# Patient Record
Sex: FEMALE | Race: OTHER | Employment: UNEMPLOYED | URBAN - METROPOLITAN AREA
[De-identification: names, ages, dates, MRNs, and addresses within clinical notes are randomized per-mention and may not be internally consistent; named-entity substitution may affect disease eponyms.]

---

## 2020-01-14 PROBLEM — D25.9 UTERINE FIBROMYOMA: Status: ACTIVE | Noted: 2020-01-14

## 2020-01-14 PROBLEM — R10.2 PELVIC PAIN: Status: ACTIVE | Noted: 2020-01-14

## 2020-01-14 PROBLEM — N92.0 MENORRHAGIA: Status: ACTIVE | Noted: 2020-01-14

## 2020-10-23 ENCOUNTER — OFFICE VISIT (OUTPATIENT)
Dept: ORTHOPEDIC SURGERY | Age: 40
End: 2020-10-23
Payer: COMMERCIAL

## 2020-10-23 VITALS
HEIGHT: 66 IN | RESPIRATION RATE: 16 BRPM | WEIGHT: 215.8 LBS | OXYGEN SATURATION: 97 % | SYSTOLIC BLOOD PRESSURE: 116 MMHG | TEMPERATURE: 97.7 F | HEART RATE: 103 BPM | DIASTOLIC BLOOD PRESSURE: 76 MMHG | BODY MASS INDEX: 34.68 KG/M2

## 2020-10-23 DIAGNOSIS — M25.562 CHRONIC PAIN OF LEFT KNEE: ICD-10-CM

## 2020-10-23 DIAGNOSIS — M17.12 PRIMARY OSTEOARTHRITIS OF LEFT KNEE: Primary | ICD-10-CM

## 2020-10-23 DIAGNOSIS — G89.29 CHRONIC PAIN OF LEFT KNEE: ICD-10-CM

## 2020-10-23 PROCEDURE — 73562 X-RAY EXAM OF KNEE 3: CPT | Performed by: SPECIALIST

## 2020-10-23 PROCEDURE — 99203 OFFICE O/P NEW LOW 30 MIN: CPT | Performed by: SPECIALIST

## 2020-10-23 PROCEDURE — 20610 DRAIN/INJ JOINT/BURSA W/O US: CPT | Performed by: SPECIALIST

## 2020-10-23 RX ORDER — BETAMETHASONE SODIUM PHOSPHATE AND BETAMETHASONE ACETATE 3; 3 MG/ML; MG/ML
6 INJECTION, SUSPENSION INTRA-ARTICULAR; INTRALESIONAL; INTRAMUSCULAR; SOFT TISSUE ONCE
Qty: 0.5 ML | Refills: 0
Start: 2020-10-23 | End: 2020-10-23

## 2020-10-23 NOTE — PROGRESS NOTES
Patient: Em Saenz                MRN: [id number]       SSN: [id number]  YOB: 1980        AGE: 41 y.o. SEX: female    PCP: Fang Cardenas MD  10/23/20    Chief Complaint   Patient presents with    Knee Pain     left knee pain     HISTORY:  Em Saenz is a 41 y.o. female who is seen for left knee pain. She has been experiencing left knee pain for the past year. She feels medial and lateral knee pain. She does not recall any recent injury. 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 She is s/p left arthroscopy in 2006 in [facility].  She responded to her arthroscopies. She reports she has a schwannoma and was told it would cause leg pain. She feels pain with standing, walking and stair climbing. She experiences startup pain after sitting. She feels as if her knee will pop out of place. She as a sharp, burning sensation and experiences swelling. She sleeps sitting up due to her pain. She has responded to previous cortisone injections. She was in pain management when she lived in [place]. She was diagnosed with her schwannoma while living in [place]. Pain Assessment  10/23/2020   Location of Pain Knee   Location Modifiers Left   Severity of Pain 6   Quality of Pain Burning; Sharp   Quality of Pain Comment swelling. stabbing. stiffness   Duration of Pain Persistent   Frequency of Pain Constant   Aggravating Factors Stairs; Walking;Standing;Squatting;Kneeling;Bending   Relieving Factors NSAID   Result of Injury No     Occupation, etc:  Ms. Yamile Sorenson is not currently employed. She used to work as a [employer]  in [facility].  She is going to apply for disability benefits. Her divorce will be finalized in December. She moved to this area from [place] in 2018. She moved to this area to be closed to her sister who served 12 years in the [employer]. She lives in [place] with her 24 yo daughter and 12 yo son.  Her daughter is studying to be a dental assistant. She gained a lot of weight recently from anxiety and depression, but recently lost 10 pounds. Ms. Saira Zelaya weighs 215 lbs and is 5'6\" tall.        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    Pelvic pain R10.2     REVIEW OF SYSTEMS:    Constitutional Symptoms: Negative   Eyes: Negative   Ears, Nose, Throat and Mouth: Negative   Cardiovascular: Negative   Respiratory: Negative   Genitourinary: Per HPI   Gastrointestinal: Per HPI   Integumentary (Skin and/or Breast): Negative   Musculoskeletal: Per HPI   Endocrine/Rheumatologic: Negative   Neurological: Per HPI   Hematology/Lymphatic: Negative    Allergic/Immunologic: Negative   Phychiatric: Negative    Social History     Socioeconomic History    Marital status: LEGALLY      Spouse name: Not on file    Number of children: Not on file    Years of education: Not on file    Highest education level: Not on file   Occupational History    Not on file   Social Needs    Financial resource strain: Not on file    Food insecurity     Worry: Not on file     Inability: Not on file    Transportation needs     Medical: Not on file     Non-medical: Not on file   Tobacco Use    Smoking status: Never Smoker    Smokeless tobacco: Never Used   Substance and Sexual Activity    Alcohol use: Never     Frequency: Never    Drug use: Not on file    Sexual activity: Not on file   Lifestyle    Physical activity     Days per week: Not on file     Minutes per session: Not on file    Stress: Not on file   Relationships    Social connections     Talks on phone: Not on file     Gets together: Not on file     Attends Gnosticism service: Not on file     Active member of club or organization: Not on file     Attends meetings of clubs or organizations: Not on file     Relationship status: Not on file    Intimate partner violence     Fear of current or ex partner: Not on file     Emotionally abused: Not on file     Physically abused: Not on file     Forced sexual activity: Not on file   Other Topics Concern    Not on file   Social History Narrative    Not on file      No Known Allergies   Current Outpatient Medications   Medication Sig    gabapentin (NEURONTIN) 300 mg capsule Take 300 mg by mouth three (3) times daily. Indications: 600mg am, 300mg 2pm, and 600mg at bedtime    atorvastatin (LIPITOR) 20 mg tablet atorvastatin 20 mg tablet    levothyroxine (SYNTHROID) 25 mcg tablet Take 25 mcg by mouth Daily (before breakfast).  amitriptyline (ELAVIL) 25 mg tablet amitriptyline 25 mg tablet   TAKE 2 TABLETS BY MOUTH EVERY DAY AT BEDTIME    ergocalciferol (ERGOCALCIFEROL) 50,000 unit capsule Take 50,000 Units by mouth every seven (7) days.  cyclobenzaprine (FLEXERIL) 10 mg tablet Take 10 mg by mouth three (3) times daily as needed for Muscle Spasm(s).  celecoxib (CELEBREX) 200 mg capsule Take 200 mg by mouth daily. No current facility-administered medications for this visit.        PHYSICAL EXAMINATION:  Visit Vitals  /76 (BP 1 Location: Left arm, BP Patient Position: Sitting)   Pulse (!) 103   Temp 97.7 °F (36.5 °C) (Temporal)   Resp 16   Ht 5' 6\" (1.676 m)   Wt 215 lb 12.8 oz (97.9 kg)   SpO2 97%   BMI 34.83 kg/m²      ORTHO EXAMINATION:  Examination Right knee Left knee   Skin Intact Intact, well healed arthroscopic portals   Range of motion 130-0 120-0   Effusion - 1+   Medial joint line tenderness - +   Lateral joint line tenderness - -   Popliteal tenderness - -   Osteophytes palpable - +   Sinceres - -   Patella crepitus + +   Anterior drawer - -   Lateral laxity - -   Medial laxity - -   Varus deformity - +   Valgus deformity - -   Pretibial edema - -   Calf tenderness - -   Using single point cane    TIME OUT:  Chart reviewed for the following:   Zaria Obregon MD, have reviewed the History, Physical and updated the Allergic reactions for [address] performed immediately prior to start of procedure:  Zaria Obregon MD, have performed the following reviews on Janessa Prajapati prior to the start of the procedure:          * Patient was identified by name and date of birth   * Agreement on procedure being performed was verified  * Risks and Benefits explained to the patient  * Procedure site verified and marked as necessary  * Patient was positioned for comfort  * Consent was obtained     Time: 1:49 PM     Date of procedure: 10/23/2020  Procedure performed by:  Abisai Dutton MD  Ms. Dawood Foster tolerated the procedure well with no complications. RADIOGRAPHS:  XR LEFT KNEE 10/23/20 BAKARI  IMPRESSION:  Three views - No fractures, no effusion, moderately severe joint space narrowing, + osteophytes present. Kellgren Zaid grade 3    IMPRESSION:      ICD-10-CM ICD-9-CM    1. Primary osteoarthritis of left knee  M17.12 715.16 betamethasone (Celestone Soluspan) 6 mg/mL injection      BETAMETHASONE ACETATE & SODIUM PHOSPHATE INJECTION 3 MG EA.      DRAIN/INJECT LARGE JOINT/BURSA      PROCEDURE AUTHORIZATION TO    2. Chronic pain of left knee  M25.562 719.46 AMB POC X-RAY KNEE 3 VIEW    G89.29 338.29 betamethasone (Celestone Soluspan) 6 mg/mL injection      BETAMETHASONE ACETATE & SODIUM PHOSPHATE INJECTION 3 MG EA.      DRAIN/INJECT LARGE JOINT/BURSA      PROCEDURE AUTHORIZATION TO      PLAN:  Dietary counseling provided today. Start weight loss with low carb diet and intermittent fasting. She will be referred for bariatric surgery consultation. Consider visco supplementation if pain continues. After discussing treatment options, patient's left knee was injected with 4 cc Marcaine and 1/2 cc Celestone.  We discussed possible need for unicompartmental or total left knee arthroplasty at some time in the future if pain continues. She will follow up as needed. She will follow up at the spine center.       Scribed by Carmelo Pa (Lili Carter) as dictated by Sarina Ho MD

## 2020-11-19 ENCOUNTER — OFFICE VISIT (OUTPATIENT)
Dept: ORTHOPEDIC SURGERY | Age: 40
End: 2020-11-19
Payer: COMMERCIAL

## 2020-11-19 VITALS
HEIGHT: 66 IN | TEMPERATURE: 94.3 F | WEIGHT: 213.8 LBS | OXYGEN SATURATION: 98 % | SYSTOLIC BLOOD PRESSURE: 126 MMHG | DIASTOLIC BLOOD PRESSURE: 85 MMHG | HEART RATE: 100 BPM | BODY MASS INDEX: 34.36 KG/M2

## 2020-11-19 DIAGNOSIS — M25.562 CHRONIC PAIN OF LEFT KNEE: ICD-10-CM

## 2020-11-19 DIAGNOSIS — G89.29 CHRONIC PAIN OF LEFT KNEE: ICD-10-CM

## 2020-11-19 DIAGNOSIS — M17.12 PRIMARY OSTEOARTHRITIS OF LEFT KNEE: Primary | ICD-10-CM

## 2020-11-19 PROCEDURE — 20610 DRAIN/INJ JOINT/BURSA W/O US: CPT | Performed by: SPECIALIST

## 2020-11-19 PROCEDURE — 99213 OFFICE O/P EST LOW 20 MIN: CPT | Performed by: SPECIALIST

## 2020-11-19 RX ORDER — BETAMETHASONE SODIUM PHOSPHATE AND BETAMETHASONE ACETATE 3; 3 MG/ML; MG/ML
3 INJECTION, SUSPENSION INTRA-ARTICULAR; INTRALESIONAL; INTRAMUSCULAR; SOFT TISSUE ONCE
Status: COMPLETED | OUTPATIENT
Start: 2020-11-19 | End: 2020-11-19

## 2020-11-19 RX ADMIN — BETAMETHASONE SODIUM PHOSPHATE AND BETAMETHASONE ACETATE 3 MG: 3; 3 INJECTION, SUSPENSION INTRA-ARTICULAR; INTRALESIONAL; INTRAMUSCULAR; SOFT TISSUE at 09:56

## 2020-11-19 NOTE — LETTER
11/19/2020 9:27 AM 
 
Ms. Cheli Cantrell [address] [facility] Floor [facility] [address] Full work restrictions Knee, Leg PATIENT'S NAME: Cheli Cantrell   DATE: 11/19/2020 LIFTING:   The patient can lift no more than 20 pounds. CLIMBING:   The patient can climb no ladders or stairs WALKING/STANDING The patient can walk or stand no more than 1 hour at a time. The patient cannot walk on uneven ground or on scaffolding. KNEELING/SQUATTING The patient cannot kneel or squat These restrictions are in effect for the above named patient From: 11/19/2020  TO:PERMANENT Sincerely, 
 
 
 
 
Samantha Sage MD

## 2020-11-19 NOTE — PROGRESS NOTES
Patient: Shannon Ybarra                MRN: [id number]       SSN: [id number]  YOB: 1980        AGE: 41 y.o. SEX: female    PCP: Maite Morrissey MD  11/19/20    Chief Complaint   Patient presents with    Knee Pain     left knee     HISTORY:  Shannon Ybarra is a 41 y.o. female who is seen for left knee pain. She responded well to a cortisone injection at last ov but her knee pain has returned. She has been experiencing left knee pain for the past year. She feels medial and lateral knee pain. She does not recall any recent injury. 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 She is s/p left arthroscopy in 2006 in [facility].  She reports she has a schwannoma and was told it would cause leg pain. She feels pain with standing, walking and stair climbing. She experiences startup pain after sitting. She feels as if her knee will pop out of place. She as a sharp, burning sensation and experiences swelling. She sleeps sitting up due to her pain. She has responded to previous cortisone injections. She has tried a variety of conservative measures including cortisone injections, activity modification, bracing, NSAIDs and physical therapy. She receives pain medication from Dr. Khadra Sapp. She was in pain management when she lived in [address] [place] [address]. She was diagnosed with her schwannoma while living in [place]. Pain Assessment  11/19/2020   Location of Pain Knee   Location Modifiers Left   Severity of Pain 7   Quality of Pain Throbbing; Sharp;Burning   Quality of Pain Comment -   Duration of Pain Persistent   Frequency of Pain Constant   Aggravating Factors Walking;Standing   Limiting Behavior Yes   Relieving Factors (No Data)   Relieving Factors Comment hot/cold patches   Result of Injury -     Occupation, etc:  Ms. Michael Phoenix is not currently employed. She used to work as a [employer]  in [place].  She is going to apply for disability benefits.  She was denied SS benefits once because her 's income was too high. Her divorce will be finalized in December. She moved to this area from [place] in 2018. She moved to this area to be closed to her sister who served 12 years in the [employer]. She lives in [place] with her 26 yo daughter and 12 yo son. Her daughter will begin a dental assistant program in January. She gained a lot of weight recently from anxiety and depression, but recently lost 10 pounds. Ms. April Pepper weighs 213 lbs and is 5'6\" tall.        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    Pelvic pain R10.2     REVIEW OF SYSTEMS:    Constitutional Symptoms: Negative   Eyes: Negative   Ears, Nose, Throat and Mouth: Negative   Cardiovascular: Negative   Respiratory: Negative   Genitourinary: Per HPI   Gastrointestinal: Per HPI   Integumentary (Skin and/or Breast): Negative   Musculoskeletal: Per HPI   Endocrine/Rheumatologic: Negative   Neurological: Per HPI   Hematology/Lymphatic: Negative    Allergic/Immunologic: Negative   Phychiatric: Negative    Social History     Socioeconomic History    Marital status: LEGALLY      Spouse name: Not on file    Number of children: Not on file    Years of education: Not on file    Highest education level: Not on file   Occupational History    Not on file   Social Needs    Financial resource strain: Not on file    Food insecurity     Worry: Not on file     Inability: Not on file   [employer] needs     Medical: Not on file     Non-medical: Not on file   Tobacco Use    Smoking status: Never Smoker    Smokeless tobacco: Never Used   Substance and Sexual Activity    Alcohol use: Never     Frequency: Never  Drug use: Not on file    Sexual activity: Not on file   Lifestyle    Physical activity     Days per week: Not on file     Minutes per session: Not on file    Stress: Not on file   Relationships    Social connections     Talks on phone: Not on file     Gets together: Not on file     Attends Baptist service: Not on file     Active member of club or organization: Not on file     Attends meetings of clubs or organizations: Not on file     Relationship status: Not on file    Intimate partner violence     Fear of current or ex partner: Not on file     Emotionally abused: Not on file     Physically abused: Not on file     Forced sexual activity: Not on file   Other Topics Concern    Not on file   Social History Narrative    Not on file      No Known Allergies   Current Outpatient Medications   Medication Sig    celecoxib (CELEBREX) 200 mg capsule Take 200 mg by mouth daily.  gabapentin (NEURONTIN) 300 mg capsule Take 300 mg by mouth three (3) times daily. Indications: 600mg am, 300mg 2pm, and 600mg at bedtime    atorvastatin (LIPITOR) 20 mg tablet atorvastatin 20 mg tablet    levothyroxine (SYNTHROID) 25 mcg tablet Take 25 mcg by mouth Daily (before breakfast).  amitriptyline (ELAVIL) 25 mg tablet amitriptyline 25 mg tablet   TAKE 2 TABLETS BY MOUTH EVERY DAY AT BEDTIME    ergocalciferol (ERGOCALCIFEROL) 50,000 unit capsule Take 50,000 Units by mouth every seven (7) days.  cyclobenzaprine (FLEXERIL) 10 mg tablet Take 10 mg by mouth three (3) times daily as needed for Muscle Spasm(s). No current facility-administered medications for this visit.        PHYSICAL EXAMINATION:  Visit Vitals  /85 (BP 1 Location: Right arm, BP Patient Position: Sitting)   Pulse 100   Temp (!) 94.3 °F (34.6 °C) (Temporal)   Ht 5' 6\" (1.676 m)   Wt 213 lb 12.8 oz (97 kg)   SpO2 98%   BMI 34.51 kg/m²      ORTHO EXAMINATION:  Examination Right knee Left knee   Skin Intact Intact, well healed arthroscopic portals Range of motion 130-0 120-0   Effusion - 1+   Medial joint line tenderness - +   Lateral joint line tenderness - -   Popliteal tenderness - -   Osteophytes palpable - +   Sinceres - -   Patella crepitus + +   Anterior drawer - -   Lateral laxity - -   Medial laxity - -   Varus deformity - +   Valgus deformity - -   Pretibial edema - -   Calf tenderness - -   Using single point cane    TIME OUT:  Chart reviewed for the following:   I, Kaci Briggs MD, have reviewed the History, Physical and updated the Allergic reactions for [address] performed immediately prior to start of procedure:  Elisabet Mtz MD, have performed the following reviews on Mira Gupta prior to the start of the procedure:          * Patient was identified by name and date of birth   * Agreement on procedure being performed was verified  * Risks and Benefits explained to the patient  * Procedure site verified and marked as necessary  * Patient was positioned for comfort  * Consent was obtained     Time: 9:22 AM     Date of procedure: 11/19/2020  Procedure performed by:  Kaci Briggs MD  Ms. Josh Brunner tolerated the procedure well with no complications. RADIOGRAPHS:  XR LEFT KNEE 10/23/20 BAKARI  IMPRESSION:  Three views - No fractures, no effusion, moderately severe joint space narrowing, + osteophytes present. Kellgren Zaid grade 3    IMPRESSION:      ICD-10-CM ICD-9-CM    1. Primary osteoarthritis of left knee  M17.12 715.16 PROCEDURE AUTHORIZATION TO       betamethasone (CELESTONE) injection 3 mg      DRAIN/INJECT LARGE JOINT/BURSA      REFERRAL TO PHYSICAL THERAPY   2. Chronic pain of left knee  M25.562 719.46 PROCEDURE AUTHORIZATION TO     G89.29 338.29 betamethasone (CELESTONE) injection 3 mg      DRAIN/INJECT LARGE JOINT/BURSA      REFERRAL TO PHYSICAL THERAPY     PLAN: Consider visco supplementation if pain continues. She will start a brief course of outpatient physical therapy. Begin permanent full knee restrictions. We discussed a possible need for left knee MRI in the future if pain continues. After discussing treatment options, patient's left knee was injected with 4 cc Marcaine and 1/2 cc Celestone. We discussed possible need for unicompartmental or total left knee arthroplasty at some time in the future if pain continues. She will follow up as needed.      Scribed by Tre Pena ([address] [place] [address] [address]) as dictated by Basilia Yeboah MD

## 2020-12-01 ENCOUNTER — HOSPITAL ENCOUNTER (OUTPATIENT)
Dept: PHYSICAL THERAPY | Age: 40
End: 2020-12-01
Attending: SPECIALIST
Payer: COMMERCIAL

## 2020-12-07 ENCOUNTER — HOSPITAL ENCOUNTER (OUTPATIENT)
Dept: PHYSICAL THERAPY | Age: 40
Discharge: HOME | End: 2020-12-07
Attending: SPECIALIST
Payer: COMMERCIAL

## 2020-12-07 PROCEDURE — 97110 THERAPEUTIC EXERCISES: CPT

## 2020-12-07 PROCEDURE — 97162 PT EVAL MOD COMPLEX 30 MIN: CPT

## 2020-12-07 NOTE — PROGRESS NOTES
[facility] AT [address], [address] [place] [address] Road  Phone: [phone]   [phone]  PLAN OF CARE / [address] [place] [address] PHYSICAL THERAPY SERVICES  Patient Name: Juan Alberto Dan : 1980   Medical   Diagnosis: Left knee pain [M25.562] Treatment Diagnosis: Left knee pain [M25.562]   Onset Date:      Referral Source: Aida Jalloh MD Start of Care [facility]): 2020   Prior Hospitalization: See medical history Provider #: [id number]   Prior Level of Function: Prior to onset, no limitations to functional mobility/ambulation. Since onset chronic difficulty with ambulation, stairs, standing. Comorbidities: Arthritis, L4-L5 Schwannoma, BMI>30   Medications: Verified on Patient Summary List   The Plan of Care and following information is based on the information from the initial evaluation.   ===========================================================================================  Assessment / key information:  Pt is a 41y.o. year old female with subjective complaints of chronic L knee pain. Pt reports pain started with meniscal injury s/p surgery in . Pt reports since that time ongoing knee pain limiting tolerance for standing, walking, household chores and sleeping. Pt reports some N/T to med/lateral joint line. Pt states her knee will occasionally lock/buckle and she uses a . Current pain is rated as 6 to 10/10. FOTO score= 28/100 indicating 72% impairment to functional activities. Today's evaulation is significant for   POSTURE/OBSERVATION:  R genu recurvatum, decreased wb'ing LLE in standing  FUNCTIONAL ASSESSMENT: Ambulates with antalgic LLE gait pattern, decreased heel-toe progression, decreased LLE wb'ing, decreased kar and gait speed.   Ambulates up/down 4 steps NR with R leg leading to ascend, LLE leading to descend; b/l UE's  ROM A/P:  L 2-0-100/110 (pain at end ranges);  R 14-0-117/124 (pain end range knee flexion). DF R 20 deg, L 12 deg. STRENGTH  Hip flex R 4/5, L 3/5; Knee Ext R 5/5, L 3+/5 (p!); flex R 5/5, L 4-/5 (p!); Ankle DF R 5/5, L 4-/5. Quad set L Poor and painful  SPECIAL TESTS: n/a due to knee irritability. ADDITIONAL FINDINGS: ttp to left med/lateral joint line and patellar tendon. Mild edema noted to L knee joint. Patellar mobs med/lat grade 1, unable to tolerate sup/inf due to pain. Moderate tightness to L>R quad/hamsting. Standing balance:  SLS R 27 sec, L 3 sec (p!)    These findings are supportive for diagnosis of L knee pain. Pt will be a good candidate for skilled PT to address these impairments and promote return to normal ADLs and functional mobility for improved quality of life.    ===========================================================================================  Eval Complexity: History HIGH Complexity :3+ comorbidities / personal factors will impact the outcome/ POC ;  Examination  MEDIUM Complexity : 3 Standardized tests and measures addressing body structure, function, activity limitation and / or participation in recreation ; Presentation MEDIUM Complexity : Evolving with changing characteristics ;   Decision Making MEDIUM Complexity : FOTO score of 26-74; Overall Complexity MEDIUM  Problem List: pain affecting function, decrease ROM, decrease strength, impaired gait/ balance, decrease ADL/ functional abilitiies, decrease activity tolerance, decrease flexibility/ joint mobility and decrease transfer abilities   Treatment Plan may include any combination of the following: Therapeutic exercise, Therapeutic activities, Neuromuscular re-education, Physical agent/modality, Gait/balance training, Manual therapy, Patient education, Self Care training and Functional mobility training  Patient / Family readiness to learn indicated by: asking questions, trying to perform skills and interest  Persons(s) to be included in education: patient (P)  Barriers to Learning/Limitations: None  Measures taken, if barriers to learning:    Patient Goal (s): \"better movement and reduce pain and make my leg stronger\"   Patient self reported health status: poor  Rehabilitation Potential: good   Short Term Goals: To be accomplished in  2  weeks:  1. Pt will be educated in appropriate HEP to decrease pain, increase ROM, increase strength and return pt to PLOF. 2. Pt will increase L knee flexion AROM by >/= 10 degrees in order to promote improved tolerance for lower body dressing. 3. Pt will demonstrate Fair isometric quad set for improved knee stability with standing activities.  Long Term Goals: To be accomplished in  4-6  weeks:  1. Pt will improve FOTO score to >/= 48 to demo a significant improvement in functional activity tolerance. 2. Pt will increase L quad strength to at least 4/5 for improved tolerance with household chores. 3. Patient to demonstrate >/= 10 sec of L SLS on firm surface in order to decrease reliance on [facility] with ambulation. Frequency / Duration:   Patient to be seen  2  times per week for 4-6  weeks:  Patient / Caregiver education and instruction: self care, activity modification and exercises  Therapist Signature: Mia Stevens, PT Date: 07/2/7558   Certification Period: n/a Time: 7:17 AM   ===========================================================================================  I certify that the above Physical Therapy Services are being furnished while the patient is under my care. I agree with the treatment plan and certify that this therapy is necessary. Physician Signature:        Date:       Time:     Please sign and return to [facility] at [facility] UNIT or you may fax the signed copy to [phone]. Thank you.

## 2020-12-07 NOTE — PROGRESS NOTES
PHYSICAL THERAPY - DAILY TREATMENT NOTE    Patient Name: Tacos Perla        Date: 2020  : 1980   yes Patient  Verified  Visit #:   1     Insurance: Payor: Hunter Wheat / Plan: [employer] HMO/CHOICE PLUS/POS / Product Type: HMO /      In time: 8:30 AM Out time: 9:20 AM   Total Treatment Time: 50     Medicare/[employer] Time Tracking (below)   Total Timed Codes (min):  40 1:1 Treatment Time:  n/a     TREATMENT AREA =  Left knee pain [M25.562]    SUBJECTIVE  Pain Level (on 0 to 10 scale):  6  / 10   Medication Changes/New allergies or changes in medical history, any new surgeries or procedures?    no  If yes, update Summary List   Subjective Functional Status/Changes:  []  No changes reported     See POC          OBJECTIVE    See exam on chart for details on objective findings      Modalities Rationale:     decrease pain and increase tissue extensibility to improve patient's ability to change/maintain body position   min [] Estim, type/location:                                      []  att     []  unatt     []  w/US     []  w/ice    []  w/heat    min []  Mechanical Traction: type/lbs                   []  pro   []  sup   []  int   []  cont    []  before manual    []  after manual    min []  Ultrasound, settings/location:      min []  Iontophoresis w/ dexamethasone, location:                                               []  take home patch       []  in clinic   10 min []  Ice     [x]  Heat    location/position: L knee; long sitting    min []  Vasopneumatic Device, press/temp:     min []  Other:    [] Skin assessment post-treatment (if applicable):    []  intact    []  redness- no adverse reaction     []redness - adverse reaction:        10 min Therapeutic Exercise:  [x]  See flow sheet   Rationale:      increase ROM and increase strength to improve the patients ability to tolerate standing/walking activities         Billed With/As:   [x] TE   [] TA   [] Neuro   [] Self Care Patient Education: [x] Review HEP    [] Progressed/Changed HEP based on:   [] positioning   [] body mechanics   [] transfers   [] heat/ice application    [] other:      Other Objective/Functional Measures:    See POC     Post Treatment Pain Level (on 0 to 10) scale:   8  / 10     ASSESSMENT  Assessment/Changes in Function:     See POC. Poor tolerance for light touch to L quad/med/lateral joint line and patellar tendon limiting tolerance for manual.  Pt reporting significant pain increase with light exercises performed today; advised to continue to tolerance and increased pain may be more a result of testing than exercises. []  See Progress Note/Recertification   Patient will continue to benefit from skilled PT services to modify and progress therapeutic interventions, address functional mobility deficits, address ROM deficits, address strength deficits, analyze and address soft tissue restrictions, analyze and cue movement patterns, analyze and modify body mechanics/ergonomics and instruct in home and community integration to attain remaining goals. Progress toward goals / Updated goals:    See POC     PLAN  []  Upgrade activities as tolerated yes Continue plan of care   []  Discharge due to :    []  Other:      Therapist: Abdifatah Francis.  Aris Ochoa, PT    Date: 12/7/2020 Time: 7:16 AM     Future Appointments   Date Time Provider Nubia Lea   12/21/2020  9:10 AM Bette Wadsworth MD [facility] BS AMB   12/23/2020  9:15 AM Mitch Hawkins, PT [facility]   12/24/2020  8:30 AM Miles Hyde [facility]   12/31/2020  8:30 AM Patricia Garces PTA [facility]   1/4/2021  9:30 AM Tono FRAGOSO, PT [facility]   1/6/2021  9:30 AM Mila Barcenas., PT [facility]   1/11/2021  9:30 AM Mila Barcenas., PT [facility]   1/13/2021  9:30 AM Mila Barcenas., PT [facility]   1/18/2021  9:30 AM Mila Barcenas., PT [facility]   1/20/2021  9:30 AM Tono FRAGOSO PT [facility]   1/25/2021  9:30 AM [facility] [id number]   1/27/2021  9:30 AM Mila Barcenas., [facility] [id number]

## 2020-12-21 ENCOUNTER — OFFICE VISIT (OUTPATIENT)
Dept: ORTHOPEDIC SURGERY | Age: 40
End: 2020-12-21
Payer: COMMERCIAL

## 2020-12-21 VITALS
RESPIRATION RATE: 14 BRPM | OXYGEN SATURATION: 100 % | DIASTOLIC BLOOD PRESSURE: 82 MMHG | WEIGHT: 214 LBS | BODY MASS INDEX: 34.39 KG/M2 | TEMPERATURE: 97.2 F | SYSTOLIC BLOOD PRESSURE: 125 MMHG | HEART RATE: 89 BPM | HEIGHT: 66 IN

## 2020-12-21 DIAGNOSIS — M25.562 CHRONIC PAIN OF LEFT KNEE: ICD-10-CM

## 2020-12-21 DIAGNOSIS — G89.29 CHRONIC PAIN OF LEFT KNEE: ICD-10-CM

## 2020-12-21 DIAGNOSIS — M17.12 PRIMARY OSTEOARTHRITIS OF LEFT KNEE: Primary | ICD-10-CM

## 2020-12-21 PROCEDURE — 99213 OFFICE O/P EST LOW 20 MIN: CPT | Performed by: SPECIALIST

## 2020-12-21 PROCEDURE — 20610 DRAIN/INJ JOINT/BURSA W/O US: CPT | Performed by: SPECIALIST

## 2020-12-21 RX ORDER — BETAMETHASONE SODIUM PHOSPHATE AND BETAMETHASONE ACETATE 3; 3 MG/ML; MG/ML
3 INJECTION, SUSPENSION INTRA-ARTICULAR; INTRALESIONAL; INTRAMUSCULAR; SOFT TISSUE ONCE
Status: COMPLETED | OUTPATIENT
Start: 2020-12-21 | End: 2020-12-21

## 2020-12-21 RX ADMIN — BETAMETHASONE SODIUM PHOSPHATE AND BETAMETHASONE ACETATE 3 MG: 3; 3 INJECTION, SUSPENSION INTRA-ARTICULAR; INTRALESIONAL; INTRAMUSCULAR; SOFT TISSUE at 09:54

## 2020-12-21 NOTE — PROGRESS NOTES
Patient: Rosetta Jonas                MRN: [id number]       SSN: [id number]  YOB: 1980        AGE: 41 y.o. SEX: female    PCP: Dung Ferguson MD  12/21/20    CC: LEFT KNEE PAIN    HISTORY:  Rosetta Jonas is a 41 y.o. female who is seen for increased left knee pain. Sheresponded temporarily to a cortisone injection at last ov but her knee pain has returned. She has been experiencing left knee pain for the past year. She feels medial and lateral knee pain. She does not recall any recent injury. 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 She is s/p left arthroscopy in 2006 in [facility].  She reports she has a schwannoma and was told it would cause leg pain. She feels pain with standing, walking and stair climbing. She experiences startup pain after sitting. She feels as if her knee will pop out of place. She as a sharp, burning sensation and experiences swelling. She sleeps sitting up due to her pain. She has responded to previous cortisone injections. She has tried a variety of conservative measures including cortisone injections, activity modification, bracing, NSAIDs and physical therapy. She receives pain medication from Dr. Ant Israel. She was in pain management when she lived in [address] [place] [address]. She was diagnosed with her schwannoma while living in [place]. Pain Assessment  12/21/2020   Location of Pain Leg   Location Modifiers Left   Severity of Pain 5   Quality of Pain Throbbing; Sharp   Quality of Pain Comment -   Duration of Pain Persistent   Frequency of Pain Constant   Aggravating Factors Standing;Walking;Stairs   Limiting Behavior No   Relieving Factors Elevation; Ice   Relieving Factors Comment -   Result of Injury No     Occupation, etc:  Ms. April Pepper is not currently employed. She used to work as a [employer]  in [facility].  She is going to apply for disability benefits.  She was denied SS benefits once because her 's income was too high. Her divorce will be finalized in December. She moved to this area from [place] in 2018. She moved to this area to be closed to her sister who served 12 years in the [employer]. She lives in [place] with her 24 yo daughter and 10 yo son. Her daughter will begin a dental assistant program in January. She gained a lot of weight recently from anxiety and depression, but recently lost 10 pounds. Ms. Kayleen Rachel weighs 214 lbs and is 5'6\" tall.        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    Pelvic pain R10.2     REVIEW OF SYSTEMS:    Constitutional Symptoms: Negative   Eyes: Negative   Ears, Nose, Throat and Mouth: Negative   Cardiovascular: Negative   Respiratory: Negative   Genitourinary: Per HPI   Gastrointestinal: Per HPI   Integumentary (Skin and/or Breast): Negative   Musculoskeletal: Per HPI   Endocrine/Rheumatologic: Negative   Neurological: Per HPI   Hematology/Lymphatic: Negative    Allergic/Immunologic: Negative   Phychiatric: Negative    Social History     Socioeconomic History    Marital status: LEGALLY      Spouse name: Not on file    Number of children: Not on file    Years of education: Not on file    Highest education level: Not on file   Occupational History    Not on file   Social Needs    Financial resource strain: Not on file    Food insecurity     Worry: Not on file     Inability: Not on file   [employer] needs     Medical: Not on file     Non-medical: Not on file   Tobacco Use    Smoking status: Never Smoker    Smokeless tobacco: Never Used   Substance and Sexual Activity    Alcohol use: Never     Frequency: Never    Drug use: Not on file    Sexual activity: Not on file   Lifestyle    Physical activity     Days per week: Not on file     Minutes per session: Not on file    Stress: Not on file   Relationships    Social connections     Talks on phone: Not on file     Gets together: Not on file     Attends [employer] service: Not on file     Active member of club or organization: Not on file     Attends meetings of clubs or organizations: Not on file     Relationship status: Not on file    Intimate partner violence     Fear of current or ex partner: Not on file     Emotionally abused: Not on file     Physically abused: Not on file     Forced sexual activity: Not on file   Other Topics Concern    Not on file   Social History Narrative    Not on file      No Known Allergies   Current Outpatient Medications   Medication Sig    celecoxib (CELEBREX) 200 mg capsule Take 200 mg by mouth daily.  gabapentin (NEURONTIN) 300 mg capsule Take 300 mg by mouth three (3) times daily. Indications: 600mg am, 300mg 2pm, and 600mg at bedtime    atorvastatin (LIPITOR) 20 mg tablet atorvastatin 20 mg tablet    levothyroxine (SYNTHROID) 25 mcg tablet Take 25 mcg by mouth Daily (before breakfast).  amitriptyline (ELAVIL) 25 mg tablet amitriptyline 25 mg tablet   TAKE 2 TABLETS BY MOUTH EVERY DAY AT BEDTIME    ergocalciferol (ERGOCALCIFEROL) 50,000 unit capsule Take 50,000 Units by mouth every seven (7) days.  cyclobenzaprine (FLEXERIL) 10 mg tablet Take 10 mg by mouth three (3) times daily as needed for Muscle Spasm(s). No current facility-administered medications for this visit.        PHYSICAL EXAMINATION:  Visit Vitals  /82 (BP 1 Location: Left arm)   Pulse 89   Temp 97.2 °F (36.2 °C) (Temporal)   Resp 14   Ht 5' 6\" (1.676 m)   Wt 214 lb (97.1 kg)   SpO2 100%   BMI 34.54 kg/m²      ORTHO EXAMINATION:  Examination Right knee Left knee   Skin Intact Intact, well healed arthroscopic portals   Range of motion 130-0 120-0   Effusion - 1+ Medial joint line tenderness - +   Lateral joint line tenderness - -   Popliteal tenderness - -   Osteophytes palpable - +   Sinceres - -   Patella crepitus + +   Anterior drawer - -   Lateral laxity - -   Medial laxity - -   Varus deformity - +   Valgus deformity - -   Pretibial edema - -   Calf tenderness - -   Using single point cane    TIME OUT:  Chart reviewed for the following:   Nima Ayon MD, have reviewed the History, Physical and updated the Allergic reactions for [address] performed immediately prior to start of procedure:  Nima Ayon MD, have performed the following reviews on Harvey Rice prior to the start of the procedure:          * Patient was identified by name and date of birth   * Agreement on procedure being performed was verified  * Risks and Benefits explained to the patient  * Procedure site verified and marked as necessary  * Patient was positioned for comfort  * Consent was obtained     Time: 9:45 AM     Date of procedure: 12/21/2020  Procedure performed by:  Rosanne Bar MD  Ms. Amy Garcia tolerated the procedure well with no complications. RADIOGRAPHS:  XR LEFT KNEE 10/23/20 BAKARI  IMPRESSION:  Three views - No fractures, no effusion, moderately severe joint space narrowing, + osteophytes present. Kellgren Zaid grade 3    IMPRESSION:      ICD-10-CM ICD-9-CM    1. Primary osteoarthritis of left knee  M17.12 715.16 betamethasone (CELESTONE) injection 3 mg      DRAIN/INJECT LARGE JOINT/BURSA   2. Chronic pain of left knee  M25.562 719.46 betamethasone (CELESTONE) injection 3 mg    G89.29 338.29 DRAIN/INJECT LARGE JOINT/BURSA     PLAN:  Continue permanent full knee restrictions. We discussed a possible need for left knee MRI in the future if pain continues. After discussing treatment options, patient's left knee was injected with 4 cc Marcaine and 1/2 cc Celestone.  We discussed possible need for unicompartmental or total left knee arthroplasty at some time in the future if pain continues. She will follow up as needed.      Scribed by Cathy Blanchard (Rosita Degree) as dictated by Barbara Grace MD

## 2020-12-24 ENCOUNTER — APPOINTMENT (OUTPATIENT)
Dept: PHYSICAL THERAPY | Age: 40
End: 2020-12-24
Attending: SPECIALIST
Payer: COMMERCIAL

## 2020-12-31 ENCOUNTER — HOSPITAL ENCOUNTER (OUTPATIENT)
Dept: PHYSICAL THERAPY | Age: 40
Discharge: HOME | End: 2020-12-31
Attending: SPECIALIST
Payer: COMMERCIAL

## 2020-12-31 PROCEDURE — 97140 MANUAL THERAPY 1/> REGIONS: CPT

## 2020-12-31 PROCEDURE — 97110 THERAPEUTIC EXERCISES: CPT

## 2020-12-31 NOTE — PROGRESS NOTES
PHYSICAL THERAPY - DAILY TREATMENT NOTE    Patient Name: Elidia Wheeler        Date: 2020  : 1980   yes Patient  Verified  Visit #:   2   of     Insurance: Payor: Ashli Res / Plan: [employer] HMO/CHOICE PLUS/POS / Product Type: HMO /      In time: 8:27 Out time: 9:39   Total Treatment Time: 72     Medicare/[facility] Time Tracking (below)   Total Timed Codes (min):   1:1 Treatment Time:       TREATMENT AREA =  Left knee pain [M25.562]    SUBJECTIVE  Pain Level (on 0 to 10 scale):  4  / 10   Medication Changes/New allergies or changes in medical history, any new surgeries or procedures?    no  If yes, update Summary List   Subjective Functional Status/Changes:  []  No changes reported   I feel most of the pain in the outside of my knee especially when I sit or stand for a long time, but I got a cortisone show last week and it has been helping some.           OBJECTIVE  Modalities Rationale:     decrease inflammation and decrease pain to improve patient's ability to improve functional abilities   min [] Estim, type/location:                                      []  att     []  unatt     []  w/US     []  w/ice    []  w/heat    min []  Mechanical Traction: type/lbs                   []  pro   []  sup   []  int   []  cont    []  before manual    []  after manual    min []  Ultrasound, settings/location:      min []  Iontophoresis w/ dexamethasone, location:                                               []  take home patch       []  in clinic   10 min [x]  Ice     []  Heat    location/position: L knee/Long sitting    min []  Vasopneumatic Device, press/temp:     min []  Other:    [] Skin assessment post-treatment (if applicable):    []  intact    []  redness- no adverse reaction     []redness - adverse reaction:        50 min Therapeutic Exercise:  [x]  See flow sheet   Rationale:      increase ROM, increase strength, improve balance and increase proprioception to improve the patients ability to improve functional      12 min Manual Therapy: Patella mobs, STM/tissue mobs to distal quads/anterior knee, manual hamstring stretching and flexion PROM/manual stretching in supine   Rationale:      decrease pain, increase ROM, increase tissue extensibility and decrease trigger points to improve patient's ability to improve functional mobility   The manual therapy interventions were performed at a separate and distinct time from the therapeutic activities interventions. Billed With/As:   [] TE   [] TA   [] Neuro   [] Self Care Patient Education: [x] Review HEP    [] Progressed/Changed HEP based on:   [] positioning   [] body mechanics   [] transfers   [] heat/ice application    [] other:      Other Objective/Functional Measures:       Post Treatment Pain Level (on 0 to 10) scale:   5  / 10     ASSESSMENT  Assessment/Changes in Function:   Pt tolerated all new therex fairly well upon trial with chief c/o increased lateral knee pain with prolonged sitting and standing ADL's. Pt has the most difficulty with quad strength and endurance with SLR's as well as increased tension in distal quads with manual intervention today. Pt needs the most focus on knee mobility and glut/hip/knee strengthening as well as LE biomechanical symmetry. Will continue to progress/advance patient within current POC as tolerated with monitoring symptoms. []  See Progress Note/Recertification   Patient will continue to benefit from skilled PT services to modify and progress therapeutic interventions, address functional mobility deficits, address ROM deficits, address strength deficits, analyze and address soft tissue restrictions, analyze and cue movement patterns and instruct in home and community integration to attain remaining goals. Progress toward goals / Updated goals: · Short Term Goals: To be accomplished in  2  weeks:  1.   Pt will be educated in appropriate HEP to decrease pain, increase ROM, increase strength and return pt to PLOF. 12/31/20: Met   2. Pt will increase L knee flexion AROM by >/= 10 degrees in order to promote improved tolerance for lower body dressing. 3. Pt will demonstrate Fair isometric quad set for improved knee stability with standing activities.     · Long Term Goals: To be accomplished in  4-6  weeks:  1. Pt will improve FOTO score to >/= 48 to demo a significant improvement in functional activity tolerance. 2. Pt will increase L quad strength to at least 4/5 for improved tolerance with household chores.   3. Patient to demonstrate >/= 10 sec of L SLS on firm surface in order to decrease reliance on [facility] with ambulation.        PLAN  [x]  Upgrade activities as tolerated yes Continue plan of care   []  Discharge due to :    []  Other:      Therapist: Roxann Gregg PTA    Date: 12/31/2020 Time: 8:37 AM     Future Appointments   Date Time Provider Nubia Lea   1/4/2021  9:30 AM Marykelsea Tellok., PT [facility]   1/6/2021  9:30 AM Joey Door D., PT [facility]   1/11/2021  9:30 AM Joey Door D., PT [facility]   1/13/2021  9:30 AM Mary Lark., PT [facility]   1/18/2021  9:30 AM Mary Lark., PT [facility]   1/20/2021  9:30 AM Mary Lark., PT [facility]   1/25/2021  9:30 AM Mary Lark., PT [facility]   1/27/2021  9:30 AM Mary Lark., PT [facility]

## 2021-01-04 ENCOUNTER — HOSPITAL ENCOUNTER (OUTPATIENT)
Dept: PHYSICAL THERAPY | Age: 41
Discharge: HOME | End: 2021-01-04
Attending: SPECIALIST
Payer: COMMERCIAL

## 2021-01-04 PROCEDURE — 97140 MANUAL THERAPY 1/> REGIONS: CPT

## 2021-01-04 PROCEDURE — 97110 THERAPEUTIC EXERCISES: CPT

## 2021-01-04 NOTE — PROGRESS NOTES
PHYSICAL THERAPY - DAILY TREATMENT NOTE    Patient Name: Karthik Al        Date: 2021  : 1980   yes Patient  Verified  Visit #:   3   of     Insurance: Payor: Debo Dodd / Plan: [employer] HMO/CHOICE PLUS/POS / Product Type: HMO /      In time: 8:54 Out time: 9:55   Total Treatment Time: 61     Medicare/[facility] Time Tracking (below)   Total Timed Codes (min):  51 1:1 Treatment Time:       TREATMENT AREA =  Left knee pain [M25.562]    SUBJECTIVE  Pain Level (on 0 to 10 scale):  4  / 10   Medication Changes/New allergies or changes in medical history, any new surgeries or procedures?    no  If yes, update Summary List   Subjective Functional Status/Changes:  []  No changes reported   Pt states continues to have high pain levels; no significant change noted from [facility].          OBJECTIVE  Modalities Rationale:     decrease inflammation and decrease pain to improve patient's ability to improve functional abilities   min [] Estim, type/location:                                      []  att     []  unatt     []  w/US     []  w/ice    []  w/heat    min []  Mechanical Traction: type/lbs                   []  pro   []  sup   []  int   []  cont    []  before manual    []  after manual    min []  Ultrasound, settings/location:      min []  Iontophoresis w/ dexamethasone, location:                                               []  take home patch       []  in clinic   10 min [x]  Ice     []  Heat    location/position: L knee/Long sitting    min []  Vasopneumatic Device, press/temp:     min []  Other:    [] Skin assessment post-treatment (if applicable):    []  intact    []  redness- no adverse reaction     []redness - adverse reaction:        41 min Therapeutic Exercise:  [x]  See flow sheet   Rationale:      increase ROM, increase strength, improve balance and increase proprioception to improve the patients ability to improve functional      10 min Manual Therapy: Patella mobs, STM/tissue mobs to distal quads/hamstrings and proximal gastroc. Manual knee ext stretching. manual hamstring stretching and flexion PROM/manual stretching in supine   Rationale:      decrease pain, increase ROM, increase tissue extensibility and decrease trigger points to improve patient's ability to improve functional mobility   The manual therapy interventions were performed at a separate and distinct time from the therapeutic activities interventions. Billed With/As:   [x] TE   [] TA   [] Neuro   [] Self Care Patient Education: [x] Review HEP    [] Progressed/Changed HEP based on:   [] positioning   [] body mechanics   [] transfers   [] heat/ice application    [] other:      Other Objective/Functional Measures:  -bike for AAROM; pt able to complete revolutions with seat back to level 10  -towel roll to knee for QS      Post Treatment Pain Level (on 0 to 10) scale:   3  / 10     ASSESSMENT  Assessment/Changes in Function:   See PN. NV progress seat on bike to position 9. []  See Progress Note/Recertification   Patient will continue to benefit from skilled PT services to modify and progress therapeutic interventions, address functional mobility deficits, address ROM deficits, address strength deficits, analyze and address soft tissue restrictions, analyze and cue movement patterns and instruct in home and community integration to attain remaining goals. Progress toward goals / Updated goals:  See PN       PLAN  [x]  Upgrade activities as tolerated yes Continue plan of care   []  Discharge due to :    []  Other:      Therapist: Dann Grey.  Tanya Renee, PT    Date: 1/4/2021 Time: 9:58 AM      Future Appointments   Date Time Provider Nubia Lea   1/4/2021  9:30 AM Anu Durand, PT [facility]   1/12/2021  3:00 PM Monique Jimenez [facility]   1/14/2021  2:45 PM Jake George, PT [facility]   1/18/2021  9:30 AM Anu Durand, PT [facility]   1/20/2021  9:30 AM Anu Durand, PT [facility] 1/25/2021  9:30 AM Hermann Mendez., PT [facility] [id number]

## 2021-01-04 NOTE — PROGRESS NOTES
[address] [place] [address] PHYSICAL THERAPY  [place] [address], [place], [address] - Phone: [phone]  Fax: [phone]  PROGRESS NOTE  Patient Name: Milagro Trivedi : 1980   Treatment/Medical Diagnosis: Left knee pain [M25.562]   Referral Source: Usama Rosado MD     Date of Initial Visit: 21 Attended Visits: 3 Missed Visits: 2     SUMMARY OF TREATMENT  Physical therapy has consisted of therapeutic exercises for increased LE strength, ROM, and flexibility. Manual therapy for decreased muscle hypertonicity and increased ROM/flexibility. Cold pack provided PRN for palliative. Pt education provided regarding HEP. CURRENT STATUS  Pt rates overall improvement as 10% with functional activities since [facility]. Pt reports LLE continues to buckle frequently with standing/walking. Pt had cortisone injection 2 weeks ago with benefit in pain reduction (states typically lasts 3 weeks). Current improvements: Pt reports \"a little bit stronger\"  Current objective impairments:  max pain 9/10 (household chores), least pain 5/10 (pain meds); average daily pain 5-6/10. STRENGTH: Knee ext 3+/5; flex 3+/5 (limited by pain to MMT)  Knee -3 to 109/118 (p! Into flex/ext); able to achieve 0 degrees PROM extension after manual therapy  PALPATION:  ttp to medial joint line. FOTO 26 (-2 point change from [facility]). GROC +1  Current functional limitations:  Walking (occasional use of cane), standing, household chores. Goal/Measure of Progress Goal Met? 1. Pt will be educated in appropriate HEP to decrease pain, increase ROM, increase strength and return pt to PLOF. Status at last Eval:  Current Status: yes yes   2. Pt will increase L knee flexion AROM by >/= 10 degrees in order to promote improved tolerance for lower body dressing. Status at last Eval: 100 Current Status: 109 (+9 deg) Progressing, nearly met   3.   Pt will demonstrate Fair isometric quad set for improved knee stability with standing activities. Status at last Eval: Poor, painful Current Status: Good; painful yes     New Goals to be achieved in __4__  weeks:  1. Pt will improve FOTO score to >/= 48 to demo a significant improvement in functional activity tolerance. 2. Pt will increase L quad strength to at least 4/5 for improved tolerance with household chores. 3. Patient to demonstrate >/= 10 sec of L SLS on firm surface in order to decrease reliance on [facility] with ambulation. 4. Pt will increase L knee flexion AROM to at least 115 for improved ease with household chores. RECOMMENDATIONS  Pt will benefit from skilled progression of PT at 2 x/wk for additional 4 weeks to attain LTG's and improve overall QOL. If you have any questions/comments please contact us directly at [phone]. Thank you for allowing us to assist in the care of your patient. Therapist Signature: Piotr Bowden. Hilda, PT Date: 1/4/2021     Time: 9:26 AM   NOTE TO PHYSICIAN:  PLEASE COMPLETE THE ORDERS BELOW AND FAX TO   [facility] Physical Therapy: [phone]  If you are unable to process this request in 24 hours please contact our office: [phone]    ___ I have read the above report and request that my patient continue as recommended.   ___ I have read the above report and request that my patient continue therapy with the following changes/special instructions:_________________________________________________________   ___ I have read the above report and request that my patient be discharged from therapy.      Physician Signature:        Date:       Time:

## 2021-01-06 ENCOUNTER — APPOINTMENT (OUTPATIENT)
Dept: PHYSICAL THERAPY | Age: 41
End: 2021-01-06
Attending: SPECIALIST
Payer: COMMERCIAL

## 2021-01-11 ENCOUNTER — APPOINTMENT (OUTPATIENT)
Dept: PHYSICAL THERAPY | Age: 41
End: 2021-01-11
Attending: SPECIALIST
Payer: COMMERCIAL

## 2021-01-12 ENCOUNTER — APPOINTMENT (OUTPATIENT)
Dept: PHYSICAL THERAPY | Age: 41
End: 2021-01-12
Attending: SPECIALIST
Payer: COMMERCIAL

## 2021-01-13 ENCOUNTER — APPOINTMENT (OUTPATIENT)
Dept: PHYSICAL THERAPY | Age: 41
End: 2021-01-13
Attending: SPECIALIST
Payer: COMMERCIAL

## 2021-01-14 ENCOUNTER — APPOINTMENT (OUTPATIENT)
Dept: PHYSICAL THERAPY | Age: 41
End: 2021-01-14
Attending: SPECIALIST
Payer: COMMERCIAL

## 2021-01-18 ENCOUNTER — APPOINTMENT (OUTPATIENT)
Dept: PHYSICAL THERAPY | Age: 41
End: 2021-01-18
Attending: SPECIALIST
Payer: COMMERCIAL

## 2021-01-20 ENCOUNTER — APPOINTMENT (OUTPATIENT)
Dept: PHYSICAL THERAPY | Age: 41
End: 2021-01-20
Attending: SPECIALIST
Payer: COMMERCIAL

## 2021-01-25 ENCOUNTER — APPOINTMENT (OUTPATIENT)
Dept: PHYSICAL THERAPY | Age: 41
End: 2021-01-25
Attending: SPECIALIST
Payer: COMMERCIAL

## 2021-01-27 ENCOUNTER — APPOINTMENT (OUTPATIENT)
Dept: PHYSICAL THERAPY | Age: 41
End: 2021-01-27
Attending: SPECIALIST
Payer: COMMERCIAL

## 2021-02-25 ENCOUNTER — OFFICE VISIT (OUTPATIENT)
Dept: ORTHOPEDIC SURGERY | Age: 41
End: 2021-02-25
Payer: COMMERCIAL

## 2021-02-25 VITALS — WEIGHT: 216 LBS | HEIGHT: 66 IN | TEMPERATURE: 97.3 F | BODY MASS INDEX: 34.72 KG/M2

## 2021-02-25 DIAGNOSIS — G89.29 CHRONIC PAIN OF LEFT KNEE: ICD-10-CM

## 2021-02-25 DIAGNOSIS — M17.12 PRIMARY OSTEOARTHRITIS OF LEFT KNEE: Primary | ICD-10-CM

## 2021-02-25 DIAGNOSIS — M25.562 CHRONIC PAIN OF LEFT KNEE: ICD-10-CM

## 2021-02-25 PROCEDURE — 99213 OFFICE O/P EST LOW 20 MIN: CPT | Performed by: SPECIALIST

## 2021-02-25 PROCEDURE — 20610 DRAIN/INJ JOINT/BURSA W/O US: CPT | Performed by: SPECIALIST

## 2021-02-25 RX ORDER — BETAMETHASONE SODIUM PHOSPHATE AND BETAMETHASONE ACETATE 3; 3 MG/ML; MG/ML
3 INJECTION, SUSPENSION INTRA-ARTICULAR; INTRALESIONAL; INTRAMUSCULAR; SOFT TISSUE ONCE
Status: COMPLETED | OUTPATIENT
Start: 2021-02-25 | End: 2021-02-25

## 2021-02-25 RX ADMIN — BETAMETHASONE SODIUM PHOSPHATE AND BETAMETHASONE ACETATE 3 MG: 3; 3 INJECTION, SUSPENSION INTRA-ARTICULAR; INTRALESIONAL; INTRAMUSCULAR; SOFT TISSUE at 16:28

## 2021-02-25 NOTE — PROGRESS NOTES
Patient: Desean Diez                MRN: [id number]       SSN: [id number]  YOB: 1980        AGE: 41 y.o. SEX: female    PCP: Mattie Rangel MD  02/25/21    CC: LEFT KNEE PAIN AND SWELLING    HISTORY:  Desean Diez is a 41 y.o. female who is seen for increased left knee pain and swelling. She responded temporarily to a cortisone injection at last ov but her knee pain has returned. She has been experiencing left knee pain for the past year. She feels medial and lateral knee pain. She does not recall any recent injury. 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 She is s/p left arthroscopy in 2006 in [facility].  She reports she has a schwannoma and was told it would cause leg pain. She feels pain with standing, walking and stair climbing. She experiences startup pain after sitting. She feels as if her knee will pop out of place. She as a sharp, burning sensation and experiences swelling. She sleeps sitting up due to her pain. She has responded to previous cortisone injections. She has tried a variety of conservative measures including cortisone injections, activity modification, bracing, NSAIDs and physical therapy. She receives pain medication from Dr. Shane Barry. She was in pain management when she lived in [place]. She was diagnosed with her schwannoma while living in [place]. Pain Assessment  2/25/2021   Location of Pain Knee   Location Modifiers Left   Severity of Pain 8   Quality of Pain Sharp; Aching   Quality of Pain Comment -   Duration of Pain Persistent   Frequency of Pain Constant   Aggravating Factors Walking;Standing; Other (Comment)   Aggravating Factors Comment sitting and laying down makes it hurt worse   Limiting Behavior Yes   Relieving Factors Elevation;Nothing   Relieving Factors Comment -   Result of Injury -     Occupation, etc:  Ms. Jacobo Morales is not currently employed.  She used to work as a [employer]  in [place].  She is going to apply for disability benefits. She was denied SS benefits once because her 's income was too high. Her divorce will be finalized this week. She moved to this area from [place] in 2018. She moved to this area to be closed to her sister who served 12 years in the [employer]. She lives in [place] with her 26 yo daughter and 10 yo son. Her son is in . Her daughter is in a dental assistant program. She gained a lot of weight recently from anxiety and depression, but recently lost 10 pounds. Ms. Celestina Quispe weighs 216 lbs and is 5'6\" tall.        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    Pelvic pain R10.2     REVIEW OF SYSTEMS:    Constitutional Symptoms: Negative   Eyes: Negative   Ears, Nose, Throat and Mouth: Negative   Cardiovascular: Negative   Respiratory: Negative   Genitourinary: Per HPI   Gastrointestinal: Per HPI   Integumentary (Skin and/or Breast): Negative   Musculoskeletal: Per HPI   Endocrine/Rheumatologic: Negative   Neurological: Per HPI   Hematology/Lymphatic: Negative    Allergic/Immunologic: Negative   Phychiatric: Negative    Social History     Socioeconomic History    Marital status: LEGALLY      Spouse name: Not on file    Number of children: Not on file    Years of education: Not on file    Highest education level: Not on file   Occupational History    Not on file   Social Needs    Financial resource strain: Not on file    Food insecurity     Worry: Not on file     Inability: Not on file    Transportation needs     Medical: Not on file     Non-medical: Not on file   Tobacco Use    Smoking status: Never Smoker    Smokeless tobacco: Never Used   Substance and Sexual Activity    Alcohol use: Never     Frequency: Never    Drug use: Not on file    Sexual activity: Not on file   Lifestyle    Physical activity     Days per week: Not on file     Minutes per session: Not on file    Stress: Not on file   Relationships    Social connections     Talks on phone: Not on file     Gets together: Not on file     Attends Religion service: Not on file     Active member of club or organization: Not on file     Attends meetings of clubs or organizations: Not on file     Relationship status: Not on file    Intimate partner violence     Fear of current or ex partner: Not on file     Emotionally abused: Not on file     Physically abused: Not on file     Forced sexual activity: Not on file   Other Topics Concern    Not on file   Social History Narrative    Not on file      No Known Allergies   Current Outpatient Medications   Medication Sig    celecoxib (CELEBREX) 200 mg capsule Take 200 mg by mouth daily.  gabapentin (NEURONTIN) 300 mg capsule Take 300 mg by mouth three (3) times daily. Indications: 600mg am, 300mg 2pm, and 600mg at bedtime    atorvastatin (LIPITOR) 20 mg tablet atorvastatin 20 mg tablet    levothyroxine (SYNTHROID) 25 mcg tablet Take 25 mcg by mouth Daily (before breakfast).  amitriptyline (ELAVIL) 25 mg tablet amitriptyline 25 mg tablet   TAKE 2 TABLETS BY MOUTH EVERY DAY AT BEDTIME    ergocalciferol (ERGOCALCIFEROL) 50,000 unit capsule Take 50,000 Units by mouth every seven (7) days.  cyclobenzaprine (FLEXERIL) 10 mg tablet Take 10 mg by mouth three (3) times daily as needed for Muscle Spasm(s). No current facility-administered medications for this visit.        PHYSICAL EXAMINATION:  Visit Vitals  Temp 97.3 °F (36.3 °C) (Temporal)   Ht 5' 6\" (1.676 m)   Wt 216 lb (98 kg)   BMI 34.86 kg/m²      ORTHO EXAMINATION:  Examination Right knee Left knee   Skin Intact Intact, well healed arthroscopic portals   Range of motion 130-0 120-0   Effusion - 1+   Medial joint line tenderness - +   Lateral joint line tenderness - -   Popliteal tenderness - -   Osteophytes palpable - +   Sinceres - -   Patella crepitus + +   Anterior drawer - -   Lateral laxity - -   Medial laxity - -   Varus deformity - +   Valgus deformity - -   Pretibial edema - -   Calf tenderness - -   Using single point cane    TIME OUT:  Chart reviewed for the following:   I, Leonardo Gonzalez MD, have reviewed the History, Physical and updated the Allergic reactions for [address] performed immediately prior to start of procedure:  Veronica Seip, MD, have performed the following reviews on [employer]  prior to the start of the procedure:          * Patient was identified by name and date of birth   * Agreement on procedure being performed was verified  * Risks and Benefits explained to the patient  * Procedure site verified and marked as necessary  * Patient was positioned for comfort  * Consent was obtained     Time: 4:19 PM     Date of procedure: 2/25/2021  Procedure performed by:  Leonardo Gonzalez MD  Ms. Joey Baird tolerated the procedure well with no complications. RADIOGRAPHS:  XR LEFT KNEE 10/23/20 BAKARI  IMPRESSION:  Three views - No fractures, no effusion, moderately severe joint space narrowing, + osteophytes present. Kellgren Zaid grade 3    IMPRESSION:      ICD-10-CM ICD-9-CM    1. Primary osteoarthritis of left knee  M17.12 715.16 betamethasone (CELESTONE) injection 3 mg      DRAIN/INJECT LARGE JOINT/BURSA      PROCEDURE AUTHORIZATION TO    2. Chronic pain of left knee  M25.562 719.46 betamethasone (CELESTONE) injection 3 mg    G89.29 338.29 DRAIN/INJECT LARGE JOINT/BURSA      PROCEDURE AUTHORIZATION TO      PLAN:  Continue permanent full knee restrictions.  She has tried a variety of conservative measures including NSAIDs, injections, and activity restrictions all with incomplete temporary relief.  Consider visco supplementation if pain continues.   We discussed a possible need for left knee MRI in the future if pain continues.  Attempted aspiration revealed no fluid.  After discussing treatment options, patient's left knee was injected with 4 cc Marcaine and 1/2 cc Celestone. We discussed possible need for unicompartmental or total left knee arthroplasty at some time in the future if pain continues.  She will follow up as needed.     Scribed by Monique Rojas) as dictated by Stanley Johnson MD

## 2021-03-03 NOTE — PROGRESS NOTES
[address] PHYSICAL THERAPY AT [address], [address] [place] [address]  Phone: [phone]   [phone]    DISCHARGE NOTE  Patient Name: Lamonte Arreguin : 1980   Treatment/Medical Diagnosis: Left knee pain [M25.562]   Referral Source: Luke Webb MD     Date of Initial Visit: 21 Attended Visits: 3 Missed Visits: 4       SUMMARY OF TREATMENT  Pt attended initial evaluation and 2 follow-up appointments. Unfortunately, patient failed to return for further treatment and is therefore discharged from our care at this time. A formal reassessment of goals was unable to be performed due to unplanned DC. Please refer to PN completed at last session attended on 21 for further details. RECOMMENDATIONS  Discontinue physical therapy due to patient not returning. If you have any questions/comments please contact us directly at [phone]. Thank you for allowing us to assist in the care of your patient. Therapist Signature: Ron Stevens PT Date: 21      Time: 10:55 AM

## 2021-07-19 ENCOUNTER — OFFICE VISIT (OUTPATIENT)
Dept: ORTHOPEDIC SURGERY | Age: 41
End: 2021-07-19
Payer: COMMERCIAL

## 2021-07-19 VITALS
HEART RATE: 86 BPM | HEIGHT: 66 IN | WEIGHT: 214 LBS | OXYGEN SATURATION: 99 % | BODY MASS INDEX: 34.39 KG/M2 | RESPIRATION RATE: 15 BRPM

## 2021-07-19 DIAGNOSIS — M25.562 CHRONIC PAIN OF LEFT KNEE: ICD-10-CM

## 2021-07-19 DIAGNOSIS — M17.12 PRIMARY OSTEOARTHRITIS OF LEFT KNEE: Primary | ICD-10-CM

## 2021-07-19 DIAGNOSIS — G89.29 CHRONIC PAIN OF LEFT KNEE: ICD-10-CM

## 2021-07-19 PROCEDURE — 99213 OFFICE O/P EST LOW 20 MIN: CPT | Performed by: SPECIALIST

## 2021-07-19 PROCEDURE — 20610 DRAIN/INJ JOINT/BURSA W/O US: CPT | Performed by: SPECIALIST

## 2021-07-19 RX ORDER — BETAMETHASONE SODIUM PHOSPHATE AND BETAMETHASONE ACETATE 3; 3 MG/ML; MG/ML
3 INJECTION, SUSPENSION INTRA-ARTICULAR; INTRALESIONAL; INTRAMUSCULAR; SOFT TISSUE ONCE
Status: COMPLETED | OUTPATIENT
Start: 2021-07-19 | End: 2021-07-19

## 2021-07-19 RX ADMIN — BETAMETHASONE SODIUM PHOSPHATE AND BETAMETHASONE ACETATE 3 MG: 3; 3 INJECTION, SUSPENSION INTRA-ARTICULAR; INTRALESIONAL; INTRAMUSCULAR; SOFT TISSUE at 11:07

## 2021-07-19 NOTE — PROGRESS NOTES
Patient: Lavera Sandifer                MRN: [id number]       SSN: [id number]  YOB: 1980        AGE: 35 y.o. SEX: female    PCP: Saw Thrasher MD  07/19/21    Chief Complaint   Patient presents with    Knee Pain     left knee        HISTORY:  Lavera Sandifer is a 35 y.o. female who is seen for increased left knee pain. She responded temporarily to a cortisone injection at last ov but her knee pain has returned. She has been experiencing left knee pain for the past year. She feels medial and lateral knee pain. She does not recall any recent injury. 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 She is s/p left arthroscopy in 2006 in [facility].  She reports she has a schwannoma and was told it would cause leg pain. She feels pain with standing, walking and stair climbing. She experiences startup pain after sitting. She feels as if her knee will pop out of place. She as a sharp, burning sensation and experiences swelling. She sleeps sitting up due to her pain. She has responded to previous cortisone injections. She has tried a variety of conservative measures including cortisone injections, activity modification, bracing, NSAIDs and physical therapy. She receives pain medication from Dr. Henrique Self. She was in pain management when she lived in [place]. She was diagnosed with her schwannoma while living in [place]. Pain Assessment  7/19/2021   Location of Pain Knee   Location Modifiers Left   Severity of Pain 7   Quality of Pain Aching; Sharp   Quality of Pain Comment -   Duration of Pain Persistent   Frequency of Pain Constant   Aggravating Factors Walking;Standing   Aggravating Factors Comment -   Limiting Behavior Yes   Relieving Factors -   Relieving Factors Comment -   Result of Injury -     Occupation, etc:  Ms. Lesa Wilson is not currently employed.  She used to work as a [employer]  in [place].  She is going to apply for disability benefits. She was denied SS benefits once because her 's income was too high. Her divorce will be finalized this week. She moved to this area from [place] in 2018. She moved to this area to be closed to her sister who served 12 years in the [employer]. She lives in [place] with her 26 yo daughter and 12 yo son. Her son is in . Her daughter is in a dental assistant program. She gained a lot of weight recently from anxiety and depression, but recently lost 10 pounds. Ms. Sherri Schneider weighs 216 lbs and is 5'6\" tall.        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    Pelvic pain R10.2     REVIEW OF SYSTEMS:    Constitutional Symptoms: Negative   Eyes: Negative   Ears, Nose, Throat and Mouth: Negative   Cardiovascular: Negative   Respiratory: Negative   Genitourinary: Per HPI   Gastrointestinal: Per HPI   Integumentary (Skin and/or Breast): Negative   Musculoskeletal: Per HPI   Endocrine/Rheumatologic: Negative   Neurological: Per HPI   Hematology/Lymphatic: Negative    Allergic/Immunologic: Negative   Phychiatric: Negative    Social History     Socioeconomic History    Marital status: LEGALLY      Spouse name: Not on file    Number of children: Not on file    Years of education: Not on file    Highest education level: Not on file   Occupational History    Not on file   Tobacco Use    Smoking status: Never Smoker    Smokeless tobacco: Never Used   Substance and Sexual Activity    Alcohol use: Never    Drug use: Not on file    Sexual activity: Not on file   Other Topics Concern    Not on file   Social History Narrative    Not on file     Social Determinants of Health     Financial Resource Strain:     Difficulty of Paying Living Expenses:    Food Insecurity:     Worried About Running Out of Food in the Last Year:     [address] in the Last Year:    Transportation Needs:     Lack of Transportation (Medical):  Lack of Transportation (Non-Medical):    Physical Activity:     Days of Exercise per Week:     Minutes of Exercise per Session:    Stress:     Feeling of Stress :    Social Connections:     Frequency of Communication with Friends and Family:     Frequency of Social Gatherings with Friends and Family:     Attends Christianity Services:     Active Member of Clubs or Organizations:     Attends Club or Organization Meetings:     Marital Status:    Intimate Partner Violence:     Fear of Current or Ex-Partner:     Emotionally Abused:     Physically Abused:     Sexually Abused:       No Known Allergies   Current Outpatient Medications   Medication Sig    celecoxib (CELEBREX) 200 mg capsule Take 200 mg by mouth daily.  gabapentin (NEURONTIN) 300 mg capsule Take 300 mg by mouth three (3) times daily. Indications: 600mg am, 300mg 2pm, and 600mg at bedtime    atorvastatin (LIPITOR) 20 mg tablet atorvastatin 20 mg tablet    levothyroxine (SYNTHROID) 25 mcg tablet Take 25 mcg by mouth Daily (before breakfast).  amitriptyline (ELAVIL) 25 mg tablet amitriptyline 25 mg tablet   TAKE 2 TABLETS BY MOUTH EVERY DAY AT BEDTIME    ergocalciferol (ERGOCALCIFEROL) 50,000 unit capsule Take 50,000 Units by mouth every seven (7) days.  cyclobenzaprine (FLEXERIL) 10 mg tablet Take 10 mg by mouth three (3) times daily as needed for Muscle Spasm(s). No current facility-administered medications for this visit.       PHYSICAL EXAMINATION:  Visit Vitals  Pulse 86   Resp 15   Ht 5' 6\" (1.676 m)   Wt 214 lb (97.1 kg)   LMP 01/01/2020   SpO2 99%   BMI 34.54 kg/m²      ORTHO EXAMINATION:  Examination Right knee Left knee   Skin Intact Intact, well healed arthroscopic portals   Range of motion 130-0 120-0   Effusion - 1+   Medial joint line tenderness - +   Lateral joint line tenderness - -   Popliteal tenderness - -   Osteophytes palpable - +   Sinceres - -   Patella crepitus + +   Anterior drawer - -   Lateral laxity - -   Medial laxity - -   Varus deformity - +   Valgus deformity - -   Pretibial edema - -   Calf tenderness - -   Using single point cane    TIME OUT:  Chart reviewed for the following:   I, Briseyda Saldivar MD, have reviewed the History, Physical and updated the Allergic reactions for [address] performed immediately prior to start of procedure:  Rodney Boo MD, have performed the following reviews on [employer] prior to the start of the procedure:          * Patient was identified by name and date of birth   * Agreement on procedure being performed was verified  * Risks and Benefits explained to the patient  * Procedure site verified and marked as necessary  * Patient was positioned for comfort  * Consent was obtained     Time: 11:02 AM     Date of procedure: 7/19/2021  Procedure performed by:  Briseyda Saldivar MD  Ms. Tiny Mahmood tolerated the procedure well with no complications. RADIOGRAPHS:  XR LEFT KNEE 10/23/20 BAKARI  IMPRESSION:  Three views - No fractures, no effusion, moderately severe joint space narrowing, + osteophytes present. Kellgren Zaid grade 3    IMPRESSION:      ICD-10-CM ICD-9-CM    1. Primary osteoarthritis of left knee  M17.12 715.16 betamethasone (CELESTONE) injection 3 mg      DRAIN/INJECT LARGE JOINT/BURSA      PROCEDURE AUTHORIZATION TO    2. Chronic pain of left knee  M25.562 719.46 betamethasone (CELESTONE) injection 3 mg    G89.29 338.29 DRAIN/INJECT LARGE JOINT/BURSA      PROCEDURE AUTHORIZATION TO      PLAN:  Continue permanent full knee restrictions.  We discussed possible need for a left knee arthroplasty at some time in the future if pain continues, but her age and weight are contraindications. Consider visco supplementation if pain continues. After discussing treatment options, patient's left knee was injected with 4 cc Marcaine and 1/2 cc Celestone. She will follow up as needed.      Scribed by Candy Prasad ([address]) as dictated by Daniel Leal MD

## 2021-10-12 ENCOUNTER — OFFICE VISIT (OUTPATIENT)
Dept: ORTHOPEDIC SURGERY | Age: 41
End: 2021-10-12
Payer: COMMERCIAL

## 2021-10-12 VITALS
HEIGHT: 66 IN | RESPIRATION RATE: 16 BRPM | OXYGEN SATURATION: 99 % | TEMPERATURE: 97.1 F | WEIGHT: 213.4 LBS | BODY MASS INDEX: 34.3 KG/M2 | HEART RATE: 88 BPM

## 2021-10-12 DIAGNOSIS — M25.562 ACUTE PAIN OF LEFT KNEE: Primary | ICD-10-CM

## 2021-10-12 PROCEDURE — 73562 X-RAY EXAM OF KNEE 3: CPT | Performed by: PHYSICIAN ASSISTANT

## 2021-10-12 PROCEDURE — 20610 DRAIN/INJ JOINT/BURSA W/O US: CPT | Performed by: PHYSICIAN ASSISTANT

## 2021-10-12 PROCEDURE — 99213 OFFICE O/P EST LOW 20 MIN: CPT | Performed by: PHYSICIAN ASSISTANT

## 2021-10-12 RX ORDER — TRIAMCINOLONE ACETONIDE 40 MG/ML
40 INJECTION, SUSPENSION INTRA-ARTICULAR; INTRAMUSCULAR ONCE
Status: COMPLETED | OUTPATIENT
Start: 2021-10-12 | End: 2021-10-12

## 2021-10-12 RX ADMIN — TRIAMCINOLONE ACETONIDE 40 MG: 40 INJECTION, SUSPENSION INTRA-ARTICULAR; INTRAMUSCULAR at 09:54

## 2021-10-12 NOTE — PROGRESS NOTES
Patient: Mitul Crain                MRN: [id number]       SSN: [id number]  YOB: 1980        AGE: 35 y.o. SEX: female          PCP: Sadie Vásquez MD  10/12/21    Chief Complaint   Patient presents with    Knee Pain     left knee pain       HISTORY:  Mitul Crain is a 35 y.o. female presents to the office status post fall at home secondary to her left knee giving out. She has a history of tricompartmental osteoarthritis of the left knee and has been previously under the care of Dr. Kaylin Rabago receiving both cortisone injections and undergoing aspirations of synovial fluid. She has generally done well since his last visit. There was no loss of consciousness associated either before during or after the fall yesterday. She did have significant swelling to the left knee and had difficulties with ranging immediately following which did seem to improve over the course of the next 24 hours. Pain at rest associated with the left knee dull aching characteristic tearing pain rating of 3-4 on a 10 point scale. With activity to include short periods of walking standing for short periods as well as standing from a sitting position and sitting from standing position pain increases upwards of a 6-7 on a 10 point scale. Tylenol and Motrin have been unsuccessful for symptom management. Pain Assessment  10/12/2021   Location of Pain Knee   Location Modifiers Left   Severity of Pain 7   Quality of Pain Throbbing;Aching; Sharp   Quality of Pain Comment swelling   Duration of Pain Persistent   Frequency of Pain Constant   Aggravating Factors Walking;Standing   Aggravating Factors Comment -   Limiting Behavior -   Relieving Factors Ice   Relieving Factors Comment -   Result of Injury No           No results found for: HBA1C, THL6NEYM, SNF9RFSI  Weight Metrics 10/12/2021 7/19/2021 2/25/2021 12/21/2020 11/19/2020 10/23/2020 1/14/2020   Weight 213 lb 6.4 oz 214 lb 216 lb 214 lb 213 lb 12.8 oz 215 lb 12.8 oz 207 lb 7.3 oz   BMI 34.44 kg/m2 34.54 kg/m2 34.86 kg/m2 34.54 kg/m2 34.51 kg/m2 34.83 kg/m2 33.48 kg/m2            Problem List Items Addressed This Visit     None      Visit Diagnoses     Acute pain of left knee    -  Primary    Relevant Orders    AMB POC X-RAY KNEE 3 VIEW (Completed)          PAST MEDICAL HISTORY:   Past Medical History:   Diagnosis Date    High cholesterol     Ill-defined condition     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 Authorizing Provider   celecoxib (CELEBREX) 200 mg capsule Take 200 mg by mouth daily. Yes Provider, Historical   gabapentin (NEURONTIN) 300 mg capsule Take 300 mg by mouth three (3) times daily. Indications: 600mg am, 300mg 2pm, and 600mg at bedtime   Yes Provider, Historical   atorvastatin (LIPITOR) 20 mg tablet atorvastatin 20 mg tablet   Yes Provider, Historical   levothyroxine (SYNTHROID) 25 mcg tablet Take 25 mcg by mouth Daily (before breakfast). Yes Provider, Historical   amitriptyline (ELAVIL) 25 mg tablet amitriptyline 25 mg tablet   TAKE 2 TABLETS BY MOUTH EVERY DAY AT BEDTIME   Yes Provider, Historical   ergocalciferol (ERGOCALCIFEROL) 50,000 unit capsule Take 50,000 Units by mouth every seven (7) days. Yes Provider, Historical   cyclobenzaprine (FLEXERIL) 10 mg tablet Take 10 mg by mouth three (3) times daily as needed for Muscle Spasm(s).    Yes Provider, Historical       FAMILY HISTORY: No family history on file. SOCIAL HISTORY:   Social History     Socioeconomic History    Marital status: LEGALLY      Spouse name: Not on file    Number of children: Not on file    Years of education: Not on file    Highest education level: Not on file   Tobacco Use    Smoking status: Never Smoker    Smokeless tobacco: Never Used   Substance and Sexual Activity    Alcohol use: Never     Social Determinants of Health     Financial Resource Strain:     Difficulty of Paying Living Expenses:    Food Insecurity:     Worried About Running Out of Food in the Last Year:     [address] in the Last Year:    Transportation Needs:     Lack of Transportation (Medical):  Lack of Transportation (Non-Medical):    Physical Activity:     Days of Exercise per Week:     Minutes of Exercise per Session:    Stress:     Feeling of Stress :    Social Connections:     Frequency of Communication with Friends and Family:     Frequency of Social Gatherings with Friends and Family:     Attends [employer] Services:     Active Member of Clubs or Organizations:     Attends Club or Organization Meetings:     Marital Status:        ROS:No CP, No SOB, No fever/chills nor night sweats. No headaches, vision abnormalities to include double and oral loss of vision. No hearing abnormalities. Musculoskeletal pain per HPI. Pain is exacerbated positionally. Pt denies h/o spinal surgery, injections, or PT/chiropractor. Self treated with less than adequate relief on oral antiinflammatories. . Pt denies change in bowel or bladder habits. Pt denies fever, weight loss, or skin changes. PHYSICAL EXAM:    Visit Vitals  Pulse 88   Temp 97.1 °F (36.2 °C) (Temporal)   Resp 16   Ht 5' 6\" (1.676 m)   Wt 213 lb 6.4 oz (96.8 kg)   LMP 01/01/2020   SpO2 99%   BMI 34.44 kg/m²       Constitutional: Appears well-developed and well-nourished. No distress.  Sitting comfortably in the exam room, interacting with conversation with pleasant affect. Gait is steady and patient exhibits no evidence of ataxia. Patient is able to ambulate without difficulty. No focal neurological deficit noted. No facial droop, slurred speech, or evidence of altered mentation noted on exam.   Skin: Skin over the head, neck, bilateral limbs, and trunk is warm and dry. No rash or erythema noted. Cranial Nerves II-XII grossly intact  HENT: NC/AT. Normal symmetry, bulk and tone of facial and neck musculature. Trachea midline. No discernible thyromegaly or masses. No involuntary movements. Lymphatic: No preauricular, submandibuar, anterior or posterior cervical lymphadenopathy. Psychiatric: The patient is awake, alert, and oriented to person, place and time. Behavior is normal. Thought content normal.   Cardiovascular: No clubbing, cyanosis. No edema bilateral lower extremities. Pulmonary: No tripoding nor accessory muscle recruitment. Breathing normally, no distress, no audible wheezing. Distal cap refill intact at 2/2 Phuc UE / LE. Neuro intact Phuc UE/LE to noxious stimuli        Ortho Specific exam:    Left knee reveals skin intact. No warmth, erythema, edema, or ecchymosis. There is a 1+ effusion. Active range of motion while at bedside guarded with pain 70 degrees of flexion -10 degrees to full extension. Is a bit of a valgus deformity on extension. MCL and LCL intact. ACL PCL intact. Patella tracks midline with crepitation. Quad and patellar tendons intact nontender to direct palpation. X-ray: [facility] 10/12/2021 space 3 view of the left knee reveals tricompartmental osteoarthritis greatest lateral and patellofemoral joint spaces. No evidence of fracture deformity lesions or masses. No soft tissue ossifications. IMPRESSION:      ICD-10-CM ICD-9-CM    1. Acute pain of left knee  M25.562 719.46 AMB POC X-RAY KNEE 3 VIEW        PLAN: Today recommending an aspiration injection of her left knee.   I suspect that she has developed an acute synovitis from her fall with knee contusion. Follow-up for hyaluronic acid therapies as needed. Chart reviewed for the following:   Tricia Nixon PA-C, have reviewed the History, Physical and updated the Allergic reactions for [employer]    TIME OUT performed immediately prior to start of procedure:   IZackary PA-C, have performed the following reviews on [employer] prior to the start of the procedure:            * Patient was identified by name and date of birth   * Agreement on procedure being performed was verified  * Risks and Benefits explained to the patient  * Procedure site verified and marked as necessary  * Patient was positioned for comfort  * Consent was signed and verified             Date of procedure: 10/12/21    Time: 9:53 AM    Procedure performed by:  Beau Streeter PA-C    Provider assisted by: None     Patient assisted by: self    How tolerated by patient: tolerated the procedure well with no complications    Comments: none    Procedural: Using sterile technique and verbal and written consent were obtained appropriate timeout formed patient laying supine left knee flexed to 20 degrees on a bolster using 5 cc of 1% lidocaine superior lateral interarticular approach was injected into follow 22 cc of straw-colored aspirate removed from the same portal.  Fluid was discarded. To follow 1 cc of Kenalog at 40 mg/mL mixed with 7 mils of Sensorcaine 0.75% injected patient tolerated the procedure well. Additionally today we discussed the diagnosis of obesity and the importance of weight management for both cardiovascular health. The patient was recommended to decrease carbohydrate and sugar intake. Patient recommended a formal dietary consult which they will consider and return a call to our office.   In light of the patient's osteoarthritic findings I am making a recommendation for aerobic exercise to include but not limited to stationary bicycle, elliptical, therapeutic walking with good shoes and or swimming. Patient should avoid any running or jumping. If using the treadmill then recommendation for no elevation and no running or jogging. Walking is improved. No Narcotic indicated today. Patient given pain medication for short term acute pain relief. Goal is to treat patient according to above plan and to ultimately have patient off all pain medications once appropriate. If chronic pain management is required beyond what is expected for current orthopedic problem, will refer patient to pain management.  was reviewed and will be reviewed with every medication refill request.         Patient provided a reminder for a \"due or due soon\" health maintenance. I have asked the patient to schedule an appointment with their primary care provider for follow-up on general health maintenance concerns. Today all the patient's questions were answered to their satisfaction. Copies of x-rays reviewed if obtained this visit, and provided to patient. Dictation disclaimer:  Please note that this dictation was completed with [employer], the computer voice recognition software. Quite often unanticipated grammatical, syntax, homophones, and other interpretive errors are inadvertently transcribed by the computer software. Please disregard these errors. Please excuse any errors that have escaped final proofreading. Paco SWEENEY, APC, MPAS, PA-C  [facility]

## 2022-03-19 PROBLEM — D25.9 UTERINE FIBROMYOMA: Status: ACTIVE | Noted: 2020-01-14

## 2022-03-19 PROBLEM — R10.2 PELVIC PAIN: Status: ACTIVE | Noted: 2020-01-14

## 2022-03-19 PROBLEM — N92.0 MENORRHAGIA: Status: ACTIVE | Noted: 2020-01-14

## 2022-06-22 ENCOUNTER — OFFICE VISIT (OUTPATIENT)
Dept: ORTHOPEDIC SURGERY | Age: 42
End: 2022-06-22
Payer: COMMERCIAL

## 2022-06-22 VITALS — BODY MASS INDEX: 34.23 KG/M2 | TEMPERATURE: 97.5 F | HEIGHT: 66 IN | WEIGHT: 213 LBS

## 2022-06-22 DIAGNOSIS — M17.12 PRIMARY OSTEOARTHRITIS OF LEFT KNEE: Primary | ICD-10-CM

## 2022-06-22 PROCEDURE — 20611 DRAIN/INJ JOINT/BURSA W/US: CPT | Performed by: PHYSICIAN ASSISTANT

## 2022-06-22 RX ORDER — TRIAMCINOLONE ACETONIDE 40 MG/ML
40 INJECTION, SUSPENSION INTRA-ARTICULAR; INTRAMUSCULAR ONCE
Status: COMPLETED | OUTPATIENT
Start: 2022-06-22 | End: 2022-06-22

## 2022-06-22 RX ADMIN — TRIAMCINOLONE ACETONIDE 40 MG: 40 INJECTION, SUSPENSION INTRA-ARTICULAR; INTRAMUSCULAR at 14:41

## 2022-06-22 NOTE — PROGRESS NOTES
Patient: Sridevi Ferguson                MRN: [id number]       SSN: [id number]  YOB: 1980        AGE: 38 y.o. SEX: female          PCP: Dee Talavera MD  06/22/22    Chief Complaint   Patient presents with    Knee Pain     lt       HISTORY:  Sridevi Ferguson is a 38 y.o. female returns the office for follow-up and a occurrence of acute on chronic left knee pain with an of recurrent effusion. Patient has benefited previously with aspiration following cortisone injection. She has tricompartmental osteoarthritis of the left knee as demonstrated radiographically. Pain limits her ability to stand for short periods of time and walk short distances. Pain Assessment  6/22/2022   Location of Pain Knee   Location Modifiers Left   Severity of Pain 7   Quality of Pain Sharp; Throbbing; Other (Comment)   Quality of Pain Comment swelling   Duration of Pain Persistent   Frequency of Pain Constant   Date Pain First Started (No Data)   Date Pain First Started Comment follow up   Aggravating Factors Stairs; Walking;Standing;Bending   Aggravating Factors Comment -   Limiting Behavior Yes   Relieving Factors Ice;Nothing   Relieving Factors Comment -   Result of Injury -           No results found for: HBA1C, RTL0KOFO, GQS6FGAZ  Weight Metrics 6/22/2022 10/12/2021 7/19/2021 2/25/2021 12/21/2020 11/19/2020 10/23/2020   Weight 213 lb 213 lb 6.4 oz 214 lb 216 lb 214 lb 213 lb 12.8 oz 215 lb 12.8 oz   BMI 34.38 kg/m2 34.44 kg/m2 34.54 kg/m2 34.86 kg/m2 34.54 kg/m2 34.51 kg/m2 34.83 kg/m2            Problem List Items Addressed This Visit     None      Visit Diagnoses     Primary osteoarthritis of left knee    -  Primary    Relevant Medications    triamcinolone acetonide (KENALOG-40) 40 mg/mL injection 40 mg (Completed)    Other Relevant Orders    ARTHROCENTESIS ASPIR&/INJ MAJOR JT/BURSA W/US          PAST MEDICAL HISTORY:   Past Medical History:   Diagnosis Date  High cholesterol     Ill-defined condition     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 Authorizing Provider   celecoxib (CELEBREX) 200 mg capsule Take 200 mg by mouth daily. Provider, Historical   gabapentin (NEURONTIN) 300 mg capsule Take 300 mg by mouth three (3) times daily. Indications: 600mg am, 300mg 2pm, and 600mg at bedtime    Provider, Historical   atorvastatin (LIPITOR) 20 mg tablet atorvastatin 20 mg tablet    Provider, Historical   levothyroxine (SYNTHROID) 25 mcg tablet Take 25 mcg by mouth Daily (before breakfast). Provider, Historical   amitriptyline (ELAVIL) 25 mg tablet amitriptyline 25 mg tablet   TAKE 2 TABLETS BY MOUTH EVERY DAY AT BEDTIME    Provider, Historical   ergocalciferol (ERGOCALCIFEROL) 50,000 unit capsule Take 50,000 Units by mouth every seven (7) days. Provider, Historical   cyclobenzaprine (FLEXERIL) 10 mg tablet Take 10 mg by mouth three (3) times daily as needed for Muscle Spasm(s). Provider, Historical       FAMILY HISTORY: No family history on file. SOCIAL HISTORY:   Social History     Socioeconomic History    Marital status: LEGALLY    Tobacco Use    Smoking status: Never Smoker    Smokeless tobacco: Never Used   Substance and Sexual Activity    Alcohol use: Never       ROS:No CP, No SOB, No fever/chills nor night sweats. No headaches, vision abnormalities to include double and or loss of vision. No dizziness. No hearing abnormalities.   No Chest Pain nor Shortness of breath. Pt denies h/o spinal surgery, injections, or PT/chiropractor. Patient has attempted self treatment with less than adequate relief on oral and topical analgesic / anti inflammatory medications . Pt denies change in bowel or bladder habits. No saddle paresthesia / anesthesia. Pt denies fever, unplanned weight loss / weight gains, and no skin changes. Musculoskeletal pain per HPI. Pain is exacerbated positionally. PHYSICAL EXAM:    Visit Vitals  Temp 97.5 °F (36.4 °C) (Temporal)   Ht 5' 6\" (1.676 m)   Wt 213 lb (96.6 kg)   LMP 01/01/2020   BMI 34.38 kg/m²       Constitutional: Appears well-developed and well-nourished. No distress. Sitting comfortably in the exam room, interacting with conversation with pleasant affect. Gait appears steady and patient exhibits no evidence of ataxia. Patient is able to ambulate with caution. No focal neurological deficit noted. No facial droop, slurred speech, or evidence of altered mentation noted on exam.   Skin: Skin over the head, neck, bilateral limbs, and trunk is warm and dry. No rash or erythema noted. Cranial Nerves II-XII grossly intact  HENT: NC/AT. Normal symmetry, bulk and tone of facial and neck musculature. Trachea midline. No discernible thyromegaly or masses. No involuntary movements. Lymphatic: No preauricular, submandibuar, anterior or posterior cervical lymphadenopathy. Psychiatric: The patient is awake, alert, and oriented to person, place and time. Behavior is normal. Thought content normal.   Cardiovascular: No clubbing, cyanosis. No edema bilateral lower extremities. Pulmonary: No tripoding nor accessory muscle recruitment. Breathing normally, no distress, no audible wheezing. Distal cap refill intact at 2/2 Phuc UE / LE. Neuro intact Phuc UE/LE to noxious stimuli        Ortho Specific exam:      Left knee reveals a 1+ effusion. Skin intact with no warmth, erythema, edema, or ecchymosis.     Active range of motion guarded today while supine 90 degrees -10 degrees. Patella tracks midline. There is crepitation through ranging. X-ray: Historical tricompartmental osteoarthritis of the left knee                      IMPRESSION:      ICD-10-CM ICD-9-CM    1. Primary osteoarthritis of left knee  M17.12 715.16 triamcinolone acetonide (KENALOG-40) 40 mg/mL injection 40 mg      ARTHROCENTESIS ASPIR&/INJ MAJOR JT/BURSA W/US   2. Decreased range of motion left knee  3. Recurrent effusion left knee  4. Crepitation left knee      PLAN: Today after the recommending a aspiration injection of her left knee effusion. Patient to continue active weight loss strategies to include calorie and sugar carbohydrate and calorie reduction. She agreed with plan of care. Procedural: Using sterile technique and verbal and written consent were obtained appropriate timeout formed patient laying supine left knee flexed at 20 degrees on a bolster 5 cc of 0.75% Sensorcaine used anesthetize a superior lateral interarticular approach. To follow 18 cc of straw-colored blood tendon aspirate removed from the same portal.  Fluid was discarded. To follow 1 cc of Kenalog at 40 mg/mL mixed with 7 mils of Sensorcaine 0.75% injected. There were no complications. Patient tolerated the procedure well. Chart reviewed for the following:   Diogenes Nixon PA-C, have reviewed the History, Physical and updated the Allergic reactions for [employer]    TIME OUT performed immediately prior to start of procedure:   Vanessa DUFF PA-C, have performed the following reviews on [employer] prior to the start of the procedure:            * Patient was identified by name and date of birth   * Agreement on procedure being performed was verified  * Risks and Benefits explained to the patient  * Procedure site verified and marked as necessary  * Patient was positioned for comfort  * Consent was signed and verified             Date of procedure: 06/22/22    Time: 3:17 PM    Procedure performed by:  Myron Duckworth PA-C    Provider assisted by: None     Patient assisted by: self    How tolerated by patient: tolerated the procedure well with no complications    Comments: none             Additionally today we discussed the diagnosis of obesity and the importance of weight management for both cardiovascular health. The patient was recommended to decrease carbohydrate and sugar intake. Patient recommended a formal dietary consult which they will consider and return a call to our office. In light of the patient's osteoarthritic findings I am making a recommendation for aerobic exercise to include but not limited to stationary bicycle, elliptical, therapeutic walking with good shoes and or swimming. Patient should avoid any running or jumping. If using the treadmill then recommendation for no elevation and no running or jogging. Care plan outlined and precautions discussed. Results were reviewed with the patient. All medications were reviewed with the patient. All of pt's questions and concerns were addressed. Alarm symptoms and return precautions associated with chief complaint and evaluation were reviewed with the patient in detail. The patient demonstrated adequate understanding. The patient expresses willing compliance with the treatment plan. Special note: Medication management discussed in detail all patient's questions answered to their satisfaction. No Narcotic indicated today. Patient given pain medication for short term acute pain relief. Goal is to treat patient according to above plan and to ultimately have patient off all pain medications once appropriate. If chronic pain management is required beyond what is expected for current orthopedic problem, will refer patient to pain management.   was reviewed and will be reviewed with every medication refill request.         Patient provided a reminder for a \"due or due soon\" health maintenance. I have asked the patient to schedule an appointment with their primary care provider for follow-up on general health maintenance concerns. Today all the patient's questions were answered to their satisfaction. Copies of x-rays reviewed if obtained this visit, and provided to patient. Dictation disclaimer:  Please note that this dictation was completed with [employer], the computer voice recognition software. Quite often unanticipated grammatical, syntax, homophones, and other interpretive errors are inadvertently transcribed by the computer software. Please disregard these errors. Please excuse any errors that have escaped final proofreading. Angle SWEENEY, APC, MPAS, PA-C  [facility]

## 2022-12-16 ENCOUNTER — OFFICE VISIT (OUTPATIENT)
Dept: ORTHOPEDIC SURGERY | Age: 42
End: 2022-12-16
Payer: COMMERCIAL

## 2022-12-16 ENCOUNTER — DOCUMENTATION ONLY (OUTPATIENT)
Dept: ORTHOPEDIC SURGERY | Age: 42
End: 2022-12-16

## 2022-12-16 DIAGNOSIS — Z98.890 HISTORY OF MENISCECTOMY OF LEFT KNEE: ICD-10-CM

## 2022-12-16 DIAGNOSIS — G89.29 CHRONIC PAIN OF LEFT KNEE: ICD-10-CM

## 2022-12-16 DIAGNOSIS — Z01.810 PREOP CARDIOVASCULAR EXAM: ICD-10-CM

## 2022-12-16 DIAGNOSIS — M25.562 CHRONIC PAIN OF LEFT KNEE: ICD-10-CM

## 2022-12-16 DIAGNOSIS — Z01.818 PREOP EXAMINATION: ICD-10-CM

## 2022-12-16 DIAGNOSIS — Z01.811 PRE-OP CHEST EXAM: ICD-10-CM

## 2022-12-16 DIAGNOSIS — M17.12 PRIMARY OSTEOARTHRITIS OF LEFT KNEE: Primary | ICD-10-CM

## 2022-12-16 DIAGNOSIS — Z01.818 PREOPERATIVE TESTING: ICD-10-CM

## 2022-12-16 DIAGNOSIS — M17.12 PRIMARY OSTEOARTHRITIS OF LEFT KNEE: ICD-10-CM

## 2022-12-16 RX ORDER — BETAMETHASONE SODIUM PHOSPHATE AND BETAMETHASONE ACETATE 3; 3 MG/ML; MG/ML
3 INJECTION, SUSPENSION INTRA-ARTICULAR; INTRALESIONAL; INTRAMUSCULAR; SOFT TISSUE ONCE
Status: COMPLETED | OUTPATIENT
Start: 2022-12-16 | End: 2022-12-16

## 2022-12-16 RX ORDER — DICLOFENAC SODIUM 10 MG/G
GEL TOPICAL
Qty: 500 G | Refills: 1 | Status: SHIPPED | OUTPATIENT
Start: 2022-12-16

## 2022-12-16 RX ADMIN — BETAMETHASONE SODIUM PHOSPHATE AND BETAMETHASONE ACETATE 3 MG: 3; 3 INJECTION, SUSPENSION INTRA-ARTICULAR; INTRALESIONAL; INTRAMUSCULAR; SOFT TISSUE at 14:04

## 2022-12-16 NOTE — PROGRESS NOTES
Patient: Jesus Manuel Medina                MRN: [id number]       SSN: [id number]  YOB: 1980        AGE: 38 y.o. SEX: female    PCP: Rigoberto Yu MD  12/16/22    Chief Complaint   Patient presents with    Knee Pain     Lt        HISTORY:  Jesus Manuel Medina is a 38 y.o. female who is seen for increased, disabling left knee pain and swelling. She responded to a left knee aspiration/injection by [facility] on 06/22/2022 but her pain and swelling have returned. She has been experiencing severe left knee pain for the past year. 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 She is s/p left knee arthroscopy x 2 in 2006 in [facility] and has had pain ever since. She feels medial and lateral knee pain. She does not recall any recent injury. She feels needle-like pain behind the left kneecap. She can't walk more than 10 minutes without significant pain. Her pain is global throughout the knee and popliteal. She reports she has a schwannoma and was told it would cause leg pain. She feels pain with standing and walking. She can't climb stairs or stand for long periods. She experiences startup pain after sitting. She feels as if her knee will pop out of place. She has a sharp, burning sensation and experiences swelling. She sleeps sitting up due to her pain. She has responded temporarily to previous cortisone injections. She has tried a variety of conservative measures including cortisone injections, activity modification, bracing, NSAIDs and physical therapy. She is interested in knee replacement. She receives pain medication from Dr. Mian Carrera. She was in pain management when she lived in [place]. She was diagnosed with her schwannoma while living in [place]. Pain Assessment  6/22/2022   Location of Pain Knee   Location Modifiers Left   Severity of Pain 7   Quality of Pain Sharp; Throbbing; Other (Comment)   Quality of Pain Comment swelling   Duration of Pain Persistent   Frequency of Pain Constant   Date Pain First Started (No Data)   Date Pain First Started Comment follow up   Aggravating Factors Stairs; Walking;Standing;Bending   Aggravating Factors Comment -   Limiting Behavior Yes   Relieving Factors Ice;Nothing   Relieving Factors Comment -   Result of Injury -     Occupation, etc:  Ms. Alfredo Casiano is not currently employed. She used to work as a [employer]  in [place].  She is going to apply for disability benefits. She moved to [facility] from [place] in 2018. She moved to this area to be closed to her sister who served 12 years in the [employer]. She lives in [place] with her 24 yo daughter and 12 yo son. Ms. Alfredo Casiano weighs 213 lbs and is 5'6\" tall. She has lost about 10 lb over this year with low-carb dieting. Her daughter accompanies her to today's office visit. She has hypothyroidism and takes thyroxine.     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    Pelvic pain R10.2     REVIEW OF SYSTEMS:    Constitutional Symptoms: Negative   Eyes: Negative   Ears, Nose, Throat and Mouth: Negative   Cardiovascular: Negative   Respiratory: Negative   Genitourinary: Per HPI   Gastrointestinal: Per HPI   Integumentary (Skin and/or Breast): Negative   Musculoskeletal: Per HPI   Endocrine/Rheumatologic: Negative   Neurological: Per HPI   Hematology/Lymphatic: Negative    Allergic/Immunologic: Negative   Phychiatric: Negative    Social History     Socioeconomic History    Marital status: LEGALLY      Spouse name: Not on file    Number of children: Not on file    Years of education: Not on file    Highest education level: Not on file   Occupational History    Not on file   Tobacco Use    Smoking status: Never    Smokeless tobacco: Never   Substance and Sexual Activity    Alcohol use: Never    Drug use: Not on file    Sexual activity: Not on file   Other Topics Concern    Not on file   Social History Narrative    Not on file     Social Determinants of Health     Financial Resource Strain: Not on file   Food Insecurity: Not on file   Transportation Needs: Not on file   Physical Activity: Not on file   Stress: Not on file   Social Connections: Not on file   Intimate Partner Violence: Not on file   Housing Stability: Not on file      No Known Allergies   Current Outpatient Medications   Medication Sig    diclofenac (Voltaren) 1 % gel Apply 4 grams to the left knee up to 4 times per day, maximum 16 grams per joint per day. Dispense 5 100 gram tubes    celecoxib (CELEBREX) 200 mg capsule Take 200 mg by mouth daily. gabapentin (NEURONTIN) 300 mg capsule Take 300 mg by mouth three (3) times daily. Indications: 600mg am, 300mg 2pm, and 600mg at bedtime    atorvastatin (LIPITOR) 20 mg tablet atorvastatin 20 mg tablet    levothyroxine (SYNTHROID) 25 mcg tablet Take 25 mcg by mouth Daily (before breakfast). amitriptyline (ELAVIL) 25 mg tablet amitriptyline 25 mg tablet   TAKE 2 TABLETS BY MOUTH EVERY DAY AT BEDTIME    ergocalciferol (ERGOCALCIFEROL) 50,000 unit capsule Take 50,000 Units by mouth every seven (7) days. cyclobenzaprine (FLEXERIL) 10 mg tablet Take 10 mg by mouth three (3) times daily as needed for Muscle Spasm(s). No current facility-administered medications for this visit. PHYSICAL EXAMINATION:  Visit Vitals  [facility] 01/01/2020    Appearance: Alert, well appearing and pleasant patient who is in no distress, oriented to person, place/time, and who follows commands. HEENT: George Lara hears well, does not require hearing aids. Her sclera of the eyes are non-icteric.  She is breathing normally and no respiratory accessory muscle use is noted. No JVD present and Neck ROM within normal limits. Psychiatric: Affect and mood are appropriate. Oriented x3  Cardiovascular/Peripheral Vascular: Normal pulses to each foot. Integumentary: No rashes. Warm and normal color. No drainage. Gait: antlagic  Sensory Exam: Intact/Normal Sensation    Lymphatic: No evidence of Lymphedema  Vascular:       Pulses: palpable  Varicosities none  Wounds/Abrasion: None Present  Neuro: Negative, no tremors     ORTHO EXAMINATION:  Examination Right knee Left knee   Skin Intact Intact, well healed arthroscopic portals   Range of motion 130-0 120-0   Effusion - -   Medial joint line tenderness - +   Lateral joint line tenderness - -   Popliteal tenderness - -   Osteophytes palpable - +   Sinceres - -   Patella crepitus + +   Anterior drawer - -   Lateral laxity - -   Medial laxity - -   Varus deformity - +   Valgus deformity - -   Pretibial edema - -   Calf tenderness - -   Ambulates with a single point cane    TIME OUT:  Chart reviewed for the following:   Robin Lopez MD, have reviewed the History, Physical and updated the Allergic reactions for [address] performed immediately prior to start of procedure:  Robin Lopez MD, have performed the following reviews on Anya Mcmahan prior to the start of the procedure:          * Patient was identified by name and date of birth   * Agreement on procedure being performed was verified  * Risks and Benefits explained to the patient  * Procedure site verified and marked as necessary  * Patient was positioned for comfort  * Consent was obtained     Time: 1:42 PM      Date of procedure: 12/16/2022  Procedure performed by:  Marshall Salguero MD  Ms. Francine Simmons tolerated the procedure well with no complications.      RADIOGRAPHS:  XR KNEE LT PREOP 12/16/2022 BAKARI  IMPRESSION:  Three views with bilateral knees on AP view - No fractures, no effusion, severe joint space narrowing, lateral osteophytes present. Kellgren Zaid grade 4. Femoral valgus angle 5 degrees. XR LEFT KNEE 10/23/20 BAKARI  IMPRESSION:  Three views - No fractures, no effusion, moderately severe joint space narrowing, + osteophytes present. Kellgren Zaid grade 3    IMPRESSION:      ICD-10-CM ICD-9-CM    1. Primary osteoarthritis of left knee  M17.12 715.16 PROCEDURE AUTHORIZATION TO       diclofenac (Voltaren) 1 % gel      AMB POC X-RAY KNEE 3 VIEW      DRAIN/INJECT LARGE JOINT/BURSA      betamethasone (CELESTONE) injection 3 mg      AMB POC XRAY, KNEE; 1/2 VIEWS      2. Chronic pain of left knee  M25.562 719.46 PROCEDURE AUTHORIZATION TO     G89.29 338.29 diclofenac (Voltaren) 1 % gel      AMB POC X-RAY KNEE 3 VIEW      DRAIN/INJECT LARGE JOINT/BURSA      betamethasone (CELESTONE) injection 3 mg      AMB POC XRAY, KNEE; 1/2 VIEWS      3. History of meniscectomy of left knee  Z98.890 V45.89 PROCEDURE AUTHORIZATION TO       diclofenac (Voltaren) 1 % gel      4. Preop examination  Z01.818 V72.84         PLAN:  After timeout and under sterile conditions, attempted left knee aspiration revealed no fluid  -- just synovitis. After discussing treatment options, patient's left knee was injected with 3 cc Polocaine and 1/2 cc Celestone. Consider visco supplementation if pain continues. She will be scheduled for L TKR. She will follow up for H&P. Nakia Mabry       Scribed by Rodríguez Celestin ([address]) as dictated by Danika Jack MD

## 2023-01-31 RX ORDER — GABAPENTIN 300 MG/1
300 CAPSULE ORAL 3 TIMES DAILY
COMMUNITY

## 2023-01-31 RX ORDER — CELECOXIB 200 MG/1
200 CAPSULE ORAL DAILY
COMMUNITY

## 2023-01-31 RX ORDER — LEVOTHYROXINE SODIUM 0.03 MG/1
25 TABLET ORAL
COMMUNITY

## 2023-01-31 RX ORDER — ATORVASTATIN CALCIUM 20 MG/1
TABLET, FILM COATED ORAL
COMMUNITY
End: 2023-02-20

## 2023-01-31 RX ORDER — ERGOCALCIFEROL 1.25 MG/1
50000 CAPSULE ORAL
COMMUNITY

## 2023-01-31 RX ORDER — AMITRIPTYLINE HYDROCHLORIDE 25 MG/1
TABLET, FILM COATED ORAL
COMMUNITY
End: 2023-02-20

## 2023-01-31 RX ORDER — CYCLOBENZAPRINE HCL 10 MG
10 TABLET ORAL 3 TIMES DAILY PRN
COMMUNITY

## 2023-02-06 ENCOUNTER — HOSPITAL ENCOUNTER (OUTPATIENT)
Dept: PREADMISSION TESTING | Age: 43
Discharge: HOME | End: 2023-02-06
Payer: COMMERCIAL

## 2023-02-06 ENCOUNTER — HOSPITAL ENCOUNTER (OUTPATIENT)
Dept: GENERAL RADIOLOGY | Age: 43
Discharge: HOME | End: 2023-02-06
Payer: COMMERCIAL

## 2023-02-06 DIAGNOSIS — Z01.818 PREOPERATIVE TESTING: ICD-10-CM

## 2023-02-06 DIAGNOSIS — M17.12 PRIMARY OSTEOARTHRITIS OF LEFT KNEE: ICD-10-CM

## 2023-02-06 LAB
ALBUMIN SERPL-MCNC: 4.2 G/DL (ref 3.4–5)
ALBUMIN/GLOB SERPL: 1.4 (ref 0.8–1.7)
ALP SERPL-CCNC: 111 U/L (ref 45–117)
ALT SERPL-CCNC: 31 U/L (ref 13–56)
AMORPH CRY URNS QL MICRO: ABNORMAL
ANION GAP SERPL CALC-SCNC: 5 MMOL/L (ref 3–18)
APPEARANCE UR: ABNORMAL
APTT PPP: 29.4 SEC (ref 23–36.4)
AST SERPL-CCNC: 25 U/L (ref 10–38)
ATRIAL RATE: 81 BPM
BACTERIA URNS QL MICRO: ABNORMAL /HPF
BASOPHILS # BLD: 0.1 K/UL (ref 0–0.1)
BASOPHILS NFR BLD: 1 % (ref 0–2)
BILIRUB SERPL-MCNC: 0.3 MG/DL (ref 0.2–1)
BILIRUB UR QL: NEGATIVE
BUN SERPL-MCNC: 13 MG/DL (ref 7–18)
BUN/CREAT SERPL: 16 (ref 12–20)
CALCIUM SERPL-MCNC: 10 MG/DL (ref 8.5–10.1)
CALCULATED P AXIS, ECG09: 44 DEGREES
CALCULATED R AXIS, ECG10: 28 DEGREES
CALCULATED T AXIS, ECG11: 40 DEGREES
CHLORIDE SERPL-SCNC: 105 MMOL/L (ref 100–111)
CO2 SERPL-SCNC: 28 MMOL/L (ref 21–32)
COLOR UR: YELLOW
CREAT SERPL-MCNC: 0.83 MG/DL (ref 0.6–1.3)
DIAGNOSIS, 93000: NORMAL
DIFFERENTIAL METHOD BLD: ABNORMAL
EOSINOPHIL # BLD: 0.6 K/UL (ref 0–0.4)
EOSINOPHIL NFR BLD: 6 % (ref 0–5)
EPITH CASTS URNS QL MICRO: ABNORMAL /LPF (ref 0–5)
ERYTHROCYTE [DISTWIDTH] IN BLOOD BY AUTOMATED COUNT: 14 % (ref 11.6–14.5)
EST. AVERAGE GLUCOSE BLD GHB EST-MCNC: 134 MG/DL
GLOBULIN SER CALC-MCNC: 3.1 G/DL (ref 2–4)
GLUCOSE SERPL-MCNC: 113 MG/DL (ref 74–99)
GLUCOSE UR STRIP.AUTO-MCNC: NEGATIVE MG/DL
HBA1C MFR BLD: 6.3 % (ref 4.2–5.6)
HCT VFR BLD AUTO: 39.6 % (ref 35–45)
HGB BLD-MCNC: 12.7 G/DL (ref 12–16)
HGB UR QL STRIP: NEGATIVE
IMM GRANULOCYTES # BLD AUTO: 0 K/UL (ref 0–0.04)
IMM GRANULOCYTES NFR BLD AUTO: 0 % (ref 0–0.5)
INR PPP: 1 (ref 0.8–1.2)
KETONES UR QL STRIP.AUTO: NEGATIVE MG/DL
LEUKOCYTE ESTERASE UR QL STRIP.AUTO: NEGATIVE
LYMPHOCYTES # BLD: 3.7 K/UL (ref 0.9–3.6)
LYMPHOCYTES NFR BLD: 33 % (ref 21–52)
MCH RBC QN AUTO: 29.1 PG (ref 24–34)
MCHC RBC AUTO-ENTMCNC: 32.1 G/DL (ref 31–37)
MCV RBC AUTO: 90.6 FL (ref 78–100)
MONOCYTES # BLD: 0.7 K/UL (ref 0.05–1.2)
MONOCYTES NFR BLD: 6 % (ref 3–10)
NEUTS SEG # BLD: 6.2 K/UL (ref 1.8–8)
NEUTS SEG NFR BLD: 54 % (ref 40–73)
NITRITE UR QL STRIP.AUTO: NEGATIVE
NRBC # BLD: 0 K/UL (ref 0–0.01)
NRBC BLD-RTO: 0 PER 100 WBC
P-R INTERVAL, ECG05: 178 MS
PH UR STRIP: >8.5 [PH] (ref 5–8)
PLATELET # BLD AUTO: 375 K/UL (ref 135–420)
PMV BLD AUTO: 9.8 FL (ref 9.2–11.8)
POTASSIUM SERPL-SCNC: 4 MMOL/L (ref 3.5–5.5)
PROT SERPL-MCNC: 7.3 G/DL (ref 6.4–8.2)
PROT UR STRIP-MCNC: ABNORMAL MG/DL
PROTHROMBIN TIME: 13.6 SEC (ref 11.5–15.2)
Q-T INTERVAL, ECG07: 372 MS
QRS DURATION, ECG06: 94 MS
QTC CALCULATION (BEZET), ECG08: 432 MS
RBC # BLD AUTO: 4.37 M/UL (ref 4.2–5.3)
RBC #/AREA URNS HPF: ABNORMAL /HPF (ref 0–5)
SODIUM SERPL-SCNC: 138 MMOL/L (ref 136–145)
SP GR UR REFRACTOMETRY: 1.02 (ref 1–1.03)
UROBILINOGEN UR QL STRIP.AUTO: 0.2 EU/DL (ref 0.2–1)
VENTRICULAR RATE, ECG03: 81 BPM
WBC # BLD AUTO: 11.3 K/UL (ref 4.6–13.2)
WBC URNS QL MICRO: ABNORMAL /HPF (ref 0–4)

## 2023-02-06 PROCEDURE — 85610 PROTHROMBIN TIME: CPT

## 2023-02-06 PROCEDURE — 85025 COMPLETE CBC W/AUTO DIFF WBC: CPT

## 2023-02-06 PROCEDURE — 36415 COLL VENOUS BLD VENIPUNCTURE: CPT

## 2023-02-06 PROCEDURE — 71046 X-RAY EXAM CHEST 2 VIEWS: CPT

## 2023-02-06 PROCEDURE — 85730 THROMBOPLASTIN TIME PARTIAL: CPT

## 2023-02-06 PROCEDURE — 81001 URINALYSIS AUTO W/SCOPE: CPT

## 2023-02-06 PROCEDURE — 83036 HEMOGLOBIN GLYCOSYLATED A1C: CPT

## 2023-02-06 PROCEDURE — 80053 COMPREHEN METABOLIC PANEL: CPT

## 2023-02-06 PROCEDURE — 93005 ELECTROCARDIOGRAM TRACING: CPT

## 2023-02-15 ENCOUNTER — OFFICE VISIT (OUTPATIENT)
Age: 43
End: 2023-02-15

## 2023-02-15 VITALS
WEIGHT: 210 LBS | HEIGHT: 66 IN | TEMPERATURE: 97.8 F | BODY MASS INDEX: 33.75 KG/M2 | HEART RATE: 84 BPM | OXYGEN SATURATION: 99 %

## 2023-02-15 DIAGNOSIS — G89.29 CHRONIC PAIN OF LEFT KNEE: Primary | ICD-10-CM

## 2023-02-15 DIAGNOSIS — M25.562 CHRONIC PAIN OF LEFT KNEE: Primary | ICD-10-CM

## 2023-02-15 RX ORDER — CLONAZEPAM 1 MG/1
TABLET ORAL
COMMUNITY

## 2023-02-15 RX ORDER — LAMOTRIGINE 200 MG/1
TABLET ORAL
COMMUNITY

## 2023-02-15 RX ORDER — TRANEXAMIC ACID 100 MG/ML
15 INJECTION, SOLUTION INTRAVENOUS
OUTPATIENT
Start: 2023-02-21 | End: 2023-02-21

## 2023-02-15 RX ORDER — APREPITANT 40 MG/1
40 CAPSULE ORAL
OUTPATIENT
Start: 2023-02-21 | End: 2023-02-21

## 2023-02-15 RX ORDER — ATORVASTATIN CALCIUM 40 MG/1
TABLET, FILM COATED ORAL
COMMUNITY
Start: 2022-12-17

## 2023-02-15 RX ORDER — BACLOFEN 10 MG/1
TABLET ORAL
COMMUNITY
Start: 2021-09-14

## 2023-02-15 RX ORDER — VIBEGRON 75 MG/1
TABLET, FILM COATED ORAL
COMMUNITY

## 2023-02-15 RX ORDER — BUPROPION HYDROCHLORIDE 150 MG/1
TABLET ORAL
COMMUNITY
Start: 2022-12-16

## 2023-02-15 RX ORDER — VENLAFAXINE 75 MG/1
75 TABLET ORAL
COMMUNITY

## 2023-02-15 RX ORDER — BUPROPION HYDROCHLORIDE 150 MG/1
TABLET ORAL
COMMUNITY

## 2023-02-15 RX ORDER — TOPIRAMATE 25 MG/1
TABLET ORAL
COMMUNITY
Start: 2022-12-16

## 2023-02-15 RX ORDER — GABAPENTIN 100 MG/1
100 CAPSULE ORAL
OUTPATIENT
Start: 2023-02-21 | End: 2023-02-21

## 2023-02-15 RX ORDER — TOPIRAMATE 25 MG/1
25 TABLET ORAL 2 TIMES DAILY
COMMUNITY

## 2023-02-15 RX ORDER — LAMOTRIGINE 200 MG/1
TABLET ORAL
COMMUNITY
Start: 2022-12-23

## 2023-02-15 RX ORDER — CEFAZOLIN SODIUM 1 G/3ML
1000 INJECTION, POWDER, FOR SOLUTION INTRAMUSCULAR; INTRAVENOUS
OUTPATIENT
Start: 2023-02-21 | End: 2023-02-21

## 2023-02-15 RX ORDER — RANITIDINE HYDROCHLORIDE 15 MG/ML
SOLUTION ORAL
COMMUNITY

## 2023-02-15 RX ORDER — CELECOXIB 100 MG/1
100 CAPSULE ORAL
OUTPATIENT
Start: 2023-02-21 | End: 2023-02-21

## 2023-02-15 RX ORDER — LAMOTRIGINE 200 MG/1
200 TABLET, EXTENDED RELEASE ORAL DAILY
COMMUNITY

## 2023-02-15 RX ORDER — ACETAMINOPHEN 325 MG/1
650 TABLET ORAL
OUTPATIENT
Start: 2023-02-21 | End: 2023-02-21

## 2023-02-15 NOTE — H&P (VIEW-ONLY)
Patient: Kirsten Hernández                MRN: [id number]       SSN: [id number]  YOB: 1980        AGE: 38 y.o. SEX: female          PCP: Patricia Thorpe MD  02/15/23    Chief Complaint   Patient presents with    Knee Pain     LEFT         HISTORY:  Kirsten Hernández is a 38 y.o. female seen in preparation for her left primary total knee replacement. 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 Authorizing Provider   baclofen (LIORESAL) 10 MG tablet 1 to 2 tabs po qhs prn muscle spasm. Caution sedation.  21  Yes Historical Provider, MD   raNITIdine (ZANTAC) 75 MG/5ML syrup Take by mouth    Historical Provider, MD   atorvastatin (LIPITOR) 40 MG tablet TAKE 1 TABLET BY MOUTH EVERY DAY 22   Historical Provider, MD   buPROPion (WELLBUTRIN XL) 150 MG extended release tablet bupropion HCl  mg 24 hr tablet, extended release    Historical Provider, MD   buPROPion (WELLBUTRIN XL) 150 MG extended release tablet TAKE 1 TABLET BY MOUTH EVERY DAY IN THE MORNING 22 Historical Provider, MD   clonazePAM (KLONOPIN) 1 MG tablet clonazepam 1 mg tablet    Historical Provider, MD   lamoTRIgine (LAMICTAL) 200 MG tablet lamotrigine 200 mg tablet   TAKE 1 TABLET BY MOUTH EVERY DAY    Historical Provider, MD   lamoTRIgine (LAMICTAL) 200 MG tablet TAKE 1 TABLET BY MOUTH EVERY DAY 12/23/22   Historical Provider, MD   lamoTRIgine (LAMICTAL XR) 200 MG TB24 extended release tablet Take 200 mg by mouth daily    Historical Provider, MD   topiramate (TOPAMAX) 25 MG tablet Take 25 mg by mouth 2 times daily    Historical Provider, MD   topiramate (TOPAMAX) 25 MG tablet TAKE 3 TABLETS BY MOUTH AT BEDTIME 12/16/22   Historical Provider, MD   venlafaxine (EFFEXOR) 75 MG tablet Take 75 mg by mouth    Historical Provider, MD   vibegron (GEMTESA) 75 MG TABS tablet Gemtesa 75 mg tablet   Take 1 tablet every day by oral route. Historical Provider, MD   amitriptyline (ELAVIL) 25 MG tablet amitriptyline 25 mg tablet   TAKE 2 TABLETS BY MOUTH EVERY DAY AT BEDTIME    Ar Automatic Reconciliation   atorvastatin (LIPITOR) 20 MG tablet atorvastatin 20 mg tablet    Ar Automatic Reconciliation   celecoxib (CELEBREX) 200 MG capsule Take 200 mg by mouth daily    Ar Automatic Reconciliation   cyclobenzaprine (FLEXERIL) 10 MG tablet Take 10 mg by mouth 3 times daily as needed    Ar Automatic Reconciliation   diclofenac sodium (VOLTAREN) 1 % GEL Apply 4 grams to the left knee up to 4 times per day, maximum 16 grams per joint per day. Dispense 5 100 gram tubes 12/16/22   Ar Automatic Reconciliation   ergocalciferol (ERGOCALCIFEROL) 1.25 MG (87317 UT) capsule Take 50,000 Units by mouth every 7 days    Ar Automatic Reconciliation   gabapentin (NEURONTIN) 300 MG capsule Take 300 mg by mouth 3 times daily. Ar Automatic Reconciliation   levothyroxine (SYNTHROID) 25 MCG tablet Take 25 mcg by mouth every morning (before breakfast)    Ar Automatic Reconciliation       FAMILY HISTORY: No family history on file.     SOCIAL HISTORY:   Social History     Socioeconomic History    Marital status: Legally      Spouse name: None    Number of children: None    Years of education: None    Highest education level: None   Tobacco Use    Smoking status: Never    Smokeless tobacco: Never   Substance and Sexual Activity    Alcohol use: Never       ROS:No CP, No SOB, No fever/chills nor night sweats. No headaches, vision abnormalities to include double and or loss of vision. No dizziness. No hearing abnormalities. No Chest Pain nor Shortness of breath. Pt denies h/o spinal surgery, injections, or PT/chiropractor. Patient has attempted self treatment with less than adequate relief on oral and topical analgesic / anti inflammatory medications . Pt denies change in bowel or bladder habits. No saddle paresthesia / anesthesia. Pt denies fever, unplanned weight loss / weight gains, and no skin changes. Musculoskeletal pain per HPI. Pain is exacerbated positionally. PHYSICAL EXAM:    Pulse 84   Temp 97.8 °F (36.6 °C) (Temporal)   Ht 5' 6\" (1.676 m)   Wt 210 lb (95.3 kg)   SpO2 99%   BMI 33.89 kg/m²     Constitutional: Appears well-developed and well-nourished. No distress. Sitting comfortably in the exam room, interacting with conversation with pleasant affect. Gait appears steady and patient exhibits no evidence of ataxia. Patient is able to ambulate with caution. No focal neurological deficit noted. No facial droop, slurred speech, or evidence of altered mentation noted on exam.   Skin: Skin over the head, neck, bilateral limbs, and trunk is warm and dry. No rash or erythema noted. Cranial Nerves II-XII grossly intact  HENT: NC/AT. Normal symmetry, bulk and tone of facial and neck musculature. Trachea midline. No discernible thyromegaly or masses. No involuntary movements. Lymphatic: No preauricular, submandibuar, anterior or posterior cervical lymphadenopathy.    Psychiatric: The patient is awake, alert, and oriented to person, place and time. Behavior is normal. Thought content normal.   Cardiovascular: No clubbing, cyanosis. No edema bilateral lower extremities. Pulmonary: No tripoding nor accessory muscle recruitment. Breathing normally, no distress, no audible wheezing. Distal cap refill intact at 2/2 Alonso UE / LE. Neuro intact Alonso UE/LE to noxious stimuli        Ortho Specific exam:    Decreased range of motion left knee secondary to end-stage tricompartmental osteoarthritis                        IMPRESSION: Tricompartmental osteoarthritis of the left knee    PLAN: Per Dr. Marcus Linn left primary total knee replacement          Orthopaedically, this patient requires admission as predetermined by the attending physicians documented severity of the admitting diagnosis,  and expected need for post surgical and co morbity health management determines length of projected stay. Risk / Benefit: Surgeon will discuss in \"face to face\" encounter on day of surgery. Family History and Surgical History reviewed, discussed in detail, and deemed Non-Contributory in preparation for this surgical encounter. In light of the patient's osteoarthritic findings I am making a recommendation for aerobic exercise to include but not limited to stationary bicycle, elliptical, therapeutic walking with good shoes and or swimming. Patient should avoid any running or jumping. If using the treadmill then recommendation for no elevation and no running or jogging. Care plan outlined and precautions discussed. Results were reviewed with the patient. All medications were reviewed with the patient. All of pt's questions and concerns were addressed. Alarm symptoms and return precautions associated with chief complaint and evaluation were reviewed with the patient in detail. The patient demonstrated adequate understanding. The patient expresses willing compliance with the treatment plan.      Special note: Medication management discussed in detail all patient's questions answered to their satisfaction. Patient provided a reminder for a \"due or due soon\" health maintenance. I have asked the patient to schedule an appointment with their primary care provider for follow-up on general health maintenance concerns. Today all the patient's questions were answered to their satisfaction. Copies of x-rays reviewed if obtained this visit, and provided to patient. Dictation disclaimer:  Please note that this dictation was completed with GOOD, the computer voice recognition software. Quite often unanticipated grammatical, syntax, homophones, and other interpretive errors are inadvertently transcribed by the computer software. Please disregard these errors. Please excuse any errors that have escaped final proofreading. Liz STERLING, APC, MPAS, PA-C  [facility]

## 2023-02-15 NOTE — H&P
Patient: Pool Harman                MRN: [id number]       SSN: [id number]  YOB: 1980        AGE: 38 y.o. SEX: female          PCP: Al Harmon MD  02/15/23    Chief Complaint   Patient presents with    Knee Pain     LEFT         HISTORY:  Pool Harman is a 38 y.o. female seen in preparation for her left primary total knee replacement. 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 Authorizing Provider   baclofen (LIORESAL) 10 MG tablet 1 to 2 tabs po qhs prn muscle spasm. Caution sedation.  21  Yes Historical Provider, MD   raNITIdine (ZANTAC) 75 MG/5ML syrup Take by mouth    Historical Provider, MD   atorvastatin (LIPITOR) 40 MG tablet TAKE 1 TABLET BY MOUTH EVERY DAY 22   Historical Provider, MD   buPROPion (WELLBUTRIN XL) 150 MG extended release tablet bupropion HCl  mg 24 hr tablet, extended release    Historical Provider, MD   buPROPion (WELLBUTRIN XL) 150 MG extended release tablet TAKE 1 TABLET BY MOUTH EVERY DAY IN THE MORNING 22 Historical Provider, MD   clonazePAM (KLONOPIN) 1 MG tablet clonazepam 1 mg tablet    Historical Provider, MD   lamoTRIgine (LAMICTAL) 200 MG tablet lamotrigine 200 mg tablet   TAKE 1 TABLET BY MOUTH EVERY DAY    Historical Provider, MD   lamoTRIgine (LAMICTAL) 200 MG tablet TAKE 1 TABLET BY MOUTH EVERY DAY 12/23/22   Historical Provider, MD   lamoTRIgine (LAMICTAL XR) 200 MG TB24 extended release tablet Take 200 mg by mouth daily    Historical Provider, MD   topiramate (TOPAMAX) 25 MG tablet Take 25 mg by mouth 2 times daily    Historical Provider, MD   topiramate (TOPAMAX) 25 MG tablet TAKE 3 TABLETS BY MOUTH AT BEDTIME 12/16/22   Historical Provider, MD   venlafaxine (EFFEXOR) 75 MG tablet Take 75 mg by mouth    Historical Provider, MD   vibegron (GEMTESA) 75 MG TABS tablet Gemtesa 75 mg tablet   Take 1 tablet every day by oral route. Historical Provider, MD   amitriptyline (ELAVIL) 25 MG tablet amitriptyline 25 mg tablet   TAKE 2 TABLETS BY MOUTH EVERY DAY AT BEDTIME    Ar Automatic Reconciliation   atorvastatin (LIPITOR) 20 MG tablet atorvastatin 20 mg tablet    Ar Automatic Reconciliation   celecoxib (CELEBREX) 200 MG capsule Take 200 mg by mouth daily    Ar Automatic Reconciliation   cyclobenzaprine (FLEXERIL) 10 MG tablet Take 10 mg by mouth 3 times daily as needed    Ar Automatic Reconciliation   diclofenac sodium (VOLTAREN) 1 % GEL Apply 4 grams to the left knee up to 4 times per day, maximum 16 grams per joint per day. Dispense 5 100 gram tubes 12/16/22   Ar Automatic Reconciliation   ergocalciferol (ERGOCALCIFEROL) 1.25 MG (55731 UT) capsule Take 50,000 Units by mouth every 7 days    Ar Automatic Reconciliation   gabapentin (NEURONTIN) 300 MG capsule Take 300 mg by mouth 3 times daily. Ar Automatic Reconciliation   levothyroxine (SYNTHROID) 25 MCG tablet Take 25 mcg by mouth every morning (before breakfast)    Ar Automatic Reconciliation       FAMILY HISTORY: No family history on file.     SOCIAL HISTORY:   Social History     Socioeconomic History    Marital status: Legally      Spouse name: None    Number of children: None    Years of education: None    Highest education level: None   Tobacco Use    Smoking status: Never    Smokeless tobacco: Never   Substance and Sexual Activity    Alcohol use: Never       ROS:No CP, No SOB, No fever/chills nor night sweats. No headaches, vision abnormalities to include double and or loss of vision. No dizziness. No hearing abnormalities. No Chest Pain nor Shortness of breath. Pt denies h/o spinal surgery, injections, or PT/chiropractor. Patient has attempted self treatment with less than adequate relief on oral and topical analgesic / anti inflammatory medications . Pt denies change in bowel or bladder habits. No saddle paresthesia / anesthesia. Pt denies fever, unplanned weight loss / weight gains, and no skin changes. Musculoskeletal pain per HPI. Pain is exacerbated positionally. PHYSICAL EXAM:    Pulse 84   Temp 97.8 °F (36.6 °C) (Temporal)   Ht 5' 6\" (1.676 m)   Wt 210 lb (95.3 kg)   SpO2 99%   BMI 33.89 kg/m²     Constitutional: Appears well-developed and well-nourished. No distress. Sitting comfortably in the exam room, interacting with conversation with pleasant affect. Gait appears steady and patient exhibits no evidence of ataxia. Patient is able to ambulate with caution. No focal neurological deficit noted. No facial droop, slurred speech, or evidence of altered mentation noted on exam.   Skin: Skin over the head, neck, bilateral limbs, and trunk is warm and dry. No rash or erythema noted. Cranial Nerves II-XII grossly intact  HENT: NC/AT. Normal symmetry, bulk and tone of facial and neck musculature. Trachea midline. No discernible thyromegaly or masses. No involuntary movements. Lymphatic: No preauricular, submandibuar, anterior or posterior cervical lymphadenopathy.    Psychiatric: The patient is awake, alert, and oriented to person, place and time. Behavior is normal. Thought content normal.   Cardiovascular: No clubbing, cyanosis. No edema bilateral lower extremities. Pulmonary: No tripoding nor accessory muscle recruitment. Breathing normally, no distress, no audible wheezing. Distal cap refill intact at 2/2 Alonso UE / LE. Neuro intact Alonso UE/LE to noxious stimuli        Ortho Specific exam:    Decreased range of motion left knee secondary to end-stage tricompartmental osteoarthritis                        IMPRESSION: Tricompartmental osteoarthritis of the left knee    PLAN: Per Dr. Kapoor Favors left primary total knee replacement          Orthopaedically, this patient requires admission as predetermined by the attending physicians documented severity of the admitting diagnosis,  and expected need for post surgical and co morbity health management determines length of projected stay. Risk / Benefit: Surgeon will discuss in \"face to face\" encounter on day of surgery. Family History and Surgical History reviewed, discussed in detail, and deemed Non-Contributory in preparation for this surgical encounter. In light of the patient's osteoarthritic findings I am making a recommendation for aerobic exercise to include but not limited to stationary bicycle, elliptical, therapeutic walking with good shoes and or swimming. Patient should avoid any running or jumping. If using the treadmill then recommendation for no elevation and no running or jogging. Care plan outlined and precautions discussed. Results were reviewed with the patient. All medications were reviewed with the patient. All of pt's questions and concerns were addressed. Alarm symptoms and return precautions associated with chief complaint and evaluation were reviewed with the patient in detail. The patient demonstrated adequate understanding. The patient expresses willing compliance with the treatment plan.      Special note: Medication management discussed in detail all patient's questions answered to their satisfaction. Patient provided a reminder for a \"due or due soon\" health maintenance. I have asked the patient to schedule an appointment with their primary care provider for follow-up on general health maintenance concerns. Today all the patient's questions were answered to their satisfaction. Copies of x-rays reviewed if obtained this visit, and provided to patient. Dictation disclaimer:  Please note that this dictation was completed with linkedÃ¼, the computer voice recognition software. Quite often unanticipated grammatical, syntax, homophones, and other interpretive errors are inadvertently transcribed by the computer software. Please disregard these errors. Please excuse any errors that have escaped final proofreading. Michelle STERLING, APC, MPAS, PA-C  [facility]

## 2023-02-16 ENCOUNTER — HOSPITAL ENCOUNTER (OUTPATIENT)
Facility: HOSPITAL | Age: 43
Discharge: HOME | End: 2023-02-16
Payer: COMMERCIAL

## 2023-02-16 DIAGNOSIS — M17.12 OSTEOARTHRITIS OF LEFT KNEE, UNSPECIFIED OSTEOARTHRITIS TYPE: ICD-10-CM

## 2023-02-16 DIAGNOSIS — Z01.818 PREOPERATIVE TESTING: ICD-10-CM

## 2023-02-16 DIAGNOSIS — Z01.818 PREOP EXAMINATION: ICD-10-CM

## 2023-02-16 DIAGNOSIS — M17.12 PRIMARY OSTEOARTHRITIS OF LEFT KNEE: Primary | ICD-10-CM

## 2023-02-16 LAB
ABO + RH BLD: NORMAL
BLOOD GROUP ANTIBODIES SERPL: NORMAL
SPECIMEN EXP DATE BLD: NORMAL

## 2023-02-16 PROCEDURE — 86900 BLOOD TYPING SEROLOGIC ABO: CPT

## 2023-02-16 PROCEDURE — 36415 COLL VENOUS BLD VENIPUNCTURE: CPT

## 2023-02-20 ENCOUNTER — HOSPITAL ENCOUNTER (OUTPATIENT)
Facility: HOSPITAL | Age: 43
Discharge: HOME | End: 2023-02-23

## 2023-02-20 ENCOUNTER — ANESTHESIA EVENT (OUTPATIENT)
Facility: HOSPITAL | Age: 43
End: 2023-02-20
Payer: COMMERCIAL

## 2023-02-20 DIAGNOSIS — Z01.818 PREOP EXAMINATION: ICD-10-CM

## 2023-02-20 LAB
SARS-COV-2 RDRP RESP QL NAA+PROBE: NOT DETECTED
SOURCE: NORMAL

## 2023-02-20 PROCEDURE — 87635 SARS-COV-2 COVID-19 AMP PRB: CPT

## 2023-02-20 RX ORDER — GABAPENTIN 600 MG/1
600 TABLET ORAL DAILY
Status: ON HOLD | COMMUNITY
End: 2023-02-22 | Stop reason: HOSPADM

## 2023-02-20 NOTE — NURSE NAVIGATOR
Call placed to patient, ID verified x 2. Patient  has decided with their surgeon to have a total knee replacement to decrease  pain and improve mobility . Topics discussed included surgery preparation, what to expect the day of surgery, medications, physical and occupational therapy, and discharge planning. It was discussed that this is considered an elective surgery and that prior to the surgery  decisions such as arranging for help at home once they are discharged needs to be made. Patient agreed to get home ready for surgery and to have a ride arranged to go home. She identifies her mom as her support system, has all required DME and will be spending the night in the hospital.  Instructions were given for CHG bathing. Patient will complete the procedure the morning of surgery. She denies any rashes, bruises or open areas to her skin at this time. Patient was reminded not to apply any deoderant, perfumes, makeup or lotions to skin the morning of surgery. She will remain NPO after midnight the night before surgery and will take only the medications as instructed to take by her surgeon the morning of surgery with a sip of water. Patient verbalized that she will take her thyroid medication in the morning with a sip of water. A total knee replacement  education book will be provided to patient post op prior to discharge. Education regarding the importance of early and frequent ambulation to avoid surgical complications and to assist with pain was provided. Recommended the use of ice to assist with pain and swelling post op for 20 minutes an hour, not to be placed directly on her skin. Patient verbalized understanding of all information provided. Opportunity was given to ask questions and phone number of the Orthopaedic   was given for any questions or concerns that may arise later.

## 2023-02-20 NOTE — PROGRESS NOTES
PRE-SURGICAL INSTRUCTIONS        Patient's Name:  Cynthia Mir      Today's Date:  2/20/2023            Covid RAPID 02/20/2023@ 1300    Surgery Date:  02/21/2023              Do NOT eat or drink anything, including candy, gum, or ice chips after midnight on 02/21/2023, unless you have specific instructions from your surgeon or anesthesia provider to do so. You may brush your teeth before coming to the hospital.  No smoking 24 hours prior to the day of surgery. No alcohol 24 hours prior to the day of surgery. No recreational drugs for one week prior to the day of surgery. Leave all valuables, including money/purse, at home. Remove all jewelry, nail polish, acrylic nails, and makeup (including mascara); no lotions powders, deodorant, or perfume/cologne/after shave on the skin. Follow instruction for Hibiclens washes and CHG wipes from surgeon's office. Glasses/contact lenses and dentures may be worn to the hospital.  They will be removed prior to surgery. Call your doctor if symptoms of a cold or illness develop within 24-48 hours prior to your surgery. 11.  If you are having an outpatient procedure, please make arrangements for a responsible ADULT TO [address] [place] [address] and stay with you for 24 hours after your surgery. 12. ONE VISITOR in the hospital at this time for outpatient procedures. Exceptions may be made for surgical admissions, per nursing unit guidelines      Special Instructions:      Bring list of CURRENT medications. Bring any pertinent legal medical records. Take these medications the morning of surgery with a sip of water as instructed by office. On the day of surgery, come in the main entrance of [facility]. Let the  at the desk know you are there for surgery. A staff member will come escort you to the surgical area on the second floor.     If you have any questions or concerns, please do not hesitate to call:     (Prior to the day of surgery) [facility] department:  [phone]   (Day of surgery) Pre-Op department:  [phone]    These surgical instructions were reviewed with Eliazar Sofia during the [facility] phone call.

## 2023-02-21 ENCOUNTER — ANESTHESIA (OUTPATIENT)
Facility: HOSPITAL | Age: 43
End: 2023-02-21
Payer: COMMERCIAL

## 2023-02-21 ENCOUNTER — HOSPITAL ENCOUNTER (OUTPATIENT)
Facility: HOSPITAL | Age: 43
Setting detail: OBSERVATION
Discharge: HOME | End: 2023-02-22
Attending: SPECIALIST | Admitting: SPECIALIST
Payer: COMMERCIAL

## 2023-02-21 DIAGNOSIS — Z96.652 S/P TOTAL KNEE ARTHROPLASTY, LEFT: Primary | ICD-10-CM

## 2023-02-21 PROBLEM — M17.12 OSTEOARTHRITIS OF LEFT KNEE, UNSPECIFIED OSTEOARTHRITIS TYPE: Status: ACTIVE | Noted: 2023-02-21

## 2023-02-21 PROCEDURE — 6360000002 HC RX W HCPCS: Performed by: PHYSICIAN ASSISTANT

## 2023-02-21 PROCEDURE — 97162 PT EVAL MOD COMPLEX 30 MIN: CPT

## 2023-02-21 PROCEDURE — C9290 INJ, BUPIVACAINE LIPOSOME: HCPCS | Performed by: SPECIALIST

## 2023-02-21 PROCEDURE — 2500000003 HC RX 250 WO HCPCS: Performed by: NURSE ANESTHETIST, CERTIFIED REGISTERED

## 2023-02-21 PROCEDURE — G0378 HOSPITAL OBSERVATION PER HR: HCPCS

## 2023-02-21 PROCEDURE — 3700000000 HC ANESTHESIA ATTENDED CARE: Performed by: SPECIALIST

## 2023-02-21 PROCEDURE — 6360000002 HC RX W HCPCS: Performed by: NURSE ANESTHETIST, CERTIFIED REGISTERED

## 2023-02-21 PROCEDURE — 2580000003 HC RX 258: Performed by: NURSE ANESTHETIST, CERTIFIED REGISTERED

## 2023-02-21 PROCEDURE — 3600000003 HC SURGERY LEVEL 3 BASE: Performed by: SPECIALIST

## 2023-02-21 PROCEDURE — 6370000000 HC RX 637 (ALT 250 FOR IP): Performed by: NURSE ANESTHETIST, CERTIFIED REGISTERED

## 2023-02-21 PROCEDURE — 3600000013 HC SURGERY LEVEL 3 ADDTL 15MIN: Performed by: SPECIALIST

## 2023-02-21 PROCEDURE — 27447 TOTAL KNEE ARTHROPLASTY: CPT | Performed by: SPECIALIST

## 2023-02-21 PROCEDURE — 2580000003 HC RX 258: Performed by: SPECIALIST

## 2023-02-21 PROCEDURE — 2580000003 HC RX 258: Performed by: PHYSICIAN ASSISTANT

## 2023-02-21 PROCEDURE — 64447 NJX AA&/STRD FEMORAL NRV IMG: CPT | Performed by: STUDENT IN AN ORGANIZED HEALTH CARE EDUCATION/TRAINING PROGRAM

## 2023-02-21 PROCEDURE — 2500000003 HC RX 250 WO HCPCS: Performed by: PHYSICIAN ASSISTANT

## 2023-02-21 PROCEDURE — C1729 CATH, DRAINAGE: HCPCS | Performed by: SPECIALIST

## 2023-02-21 PROCEDURE — 6370000000 HC RX 637 (ALT 250 FOR IP): Performed by: PHYSICIAN ASSISTANT

## 2023-02-21 PROCEDURE — 2709999900 HC NON-CHARGEABLE SUPPLY: Performed by: SPECIALIST

## 2023-02-21 PROCEDURE — 6360000002 HC RX W HCPCS: Performed by: SPECIALIST

## 2023-02-21 PROCEDURE — C1776 JOINT DEVICE (IMPLANTABLE): HCPCS | Performed by: SPECIALIST

## 2023-02-21 PROCEDURE — 2700000000 HC OXYGEN THERAPY PER DAY

## 2023-02-21 PROCEDURE — 7100000000 HC PACU RECOVERY - FIRST 15 MIN: Performed by: SPECIALIST

## 2023-02-21 PROCEDURE — 64450 NJX AA&/STRD OTHER PN/BRANCH: CPT | Performed by: STUDENT IN AN ORGANIZED HEALTH CARE EDUCATION/TRAINING PROGRAM

## 2023-02-21 PROCEDURE — 6370000000 HC RX 637 (ALT 250 FOR IP): Performed by: SPECIALIST

## 2023-02-21 PROCEDURE — 97530 THERAPEUTIC ACTIVITIES: CPT

## 2023-02-21 PROCEDURE — 3700000001 HC ADD 15 MINUTES (ANESTHESIA): Performed by: SPECIALIST

## 2023-02-21 PROCEDURE — 7100000001 HC PACU RECOVERY - ADDTL 15 MIN: Performed by: SPECIALIST

## 2023-02-21 DEVICE — FEMUR NARROW CEMENTED PS LEFT, SIZE 4
Type: IMPLANTABLE DEVICE | Site: KNEE | Status: FUNCTIONAL
Brand: GMK PRIMARY TOTAL KNEE SYSTEM

## 2023-02-21 DEVICE — COMPONENT KNEE CMNTLS K1 PREMIER: Type: IMPLANTABLE DEVICE | Site: KNEE | Status: FUNCTIONAL

## 2023-02-21 DEVICE — RESURFACING PATELLA SIZE 2
Type: IMPLANTABLE DEVICE | Site: KNEE | Status: FUNCTIONAL
Brand: GMK PRIMARY TOTAL KNEE SYSTEM

## 2023-02-21 DEVICE — FIXED TIBIAL TRAY CEMENTED LEFT, SIZE 3
Type: IMPLANTABLE DEVICE | Site: KNEE | Status: FUNCTIONAL
Brand: GMK PRIMARY TOTAL KNEE SYSTEM

## 2023-02-21 DEVICE — TIBIAL INSERT PS FIXED 12MM, SIZE 3
Type: IMPLANTABLE DEVICE | Site: KNEE | Status: FUNCTIONAL
Brand: GMK PRIMARY TOTAL KNEE SYSTEM

## 2023-02-21 DEVICE — METACEM-X HV: Type: IMPLANTABLE DEVICE | Site: KNEE | Status: FUNCTIONAL

## 2023-02-21 RX ORDER — LIDOCAINE HYDROCHLORIDE 10 MG/ML
1 INJECTION, SOLUTION EPIDURAL; INFILTRATION; INTRACAUDAL; PERINEURAL
Status: COMPLETED | OUTPATIENT
Start: 2023-02-21 | End: 2023-02-21

## 2023-02-21 RX ORDER — FENTANYL CITRATE 50 UG/ML
25 INJECTION, SOLUTION INTRAMUSCULAR; INTRAVENOUS EVERY 5 MIN PRN
Status: DISCONTINUED | OUTPATIENT
Start: 2023-02-21 | End: 2023-02-21 | Stop reason: HOSPADM

## 2023-02-21 RX ORDER — DEXAMETHASONE SODIUM PHOSPHATE 4 MG/ML
INJECTION, SOLUTION INTRA-ARTICULAR; INTRALESIONAL; INTRAMUSCULAR; INTRAVENOUS; SOFT TISSUE PRN
Status: DISCONTINUED | OUTPATIENT
Start: 2023-02-21 | End: 2023-02-21 | Stop reason: SDUPTHER

## 2023-02-21 RX ORDER — GABAPENTIN 100 MG/1
100 CAPSULE ORAL
Status: DISCONTINUED | OUTPATIENT
Start: 2023-02-21 | End: 2023-02-21 | Stop reason: HOSPADM

## 2023-02-21 RX ORDER — CEFAZOLIN SODIUM 1 G/3ML
1000 INJECTION, POWDER, FOR SOLUTION INTRAMUSCULAR; INTRAVENOUS
Status: COMPLETED | OUTPATIENT
Start: 2023-02-21 | End: 2023-02-21

## 2023-02-21 RX ORDER — SODIUM CHLORIDE 0.9 % (FLUSH) 0.9 %
5-40 SYRINGE (ML) INJECTION EVERY 12 HOURS SCHEDULED
Status: DISCONTINUED | OUTPATIENT
Start: 2023-02-21 | End: 2023-02-22 | Stop reason: HOSPADM

## 2023-02-21 RX ORDER — DEXTROSE, SODIUM CHLORIDE, SODIUM LACTATE, POTASSIUM CHLORIDE, AND CALCIUM CHLORIDE 5; .6; .31; .03; .02 G/100ML; G/100ML; G/100ML; G/100ML; G/100ML
INJECTION, SOLUTION INTRAVENOUS CONTINUOUS
Status: DISCONTINUED | OUTPATIENT
Start: 2023-02-21 | End: 2023-02-22 | Stop reason: HOSPADM

## 2023-02-21 RX ORDER — PROCHLORPERAZINE EDISYLATE 5 MG/ML
5 INJECTION INTRAMUSCULAR; INTRAVENOUS
Status: DISCONTINUED | OUTPATIENT
Start: 2023-02-21 | End: 2023-02-21 | Stop reason: HOSPADM

## 2023-02-21 RX ORDER — ONDANSETRON 2 MG/ML
INJECTION INTRAMUSCULAR; INTRAVENOUS PRN
Status: DISCONTINUED | OUTPATIENT
Start: 2023-02-21 | End: 2023-02-21 | Stop reason: SDUPTHER

## 2023-02-21 RX ORDER — SODIUM CHLORIDE 0.9 % (FLUSH) 0.9 %
5-40 SYRINGE (ML) INJECTION PRN
Status: DISCONTINUED | OUTPATIENT
Start: 2023-02-21 | End: 2023-02-22 | Stop reason: HOSPADM

## 2023-02-21 RX ORDER — MEPERIDINE HYDROCHLORIDE 25 MG/ML
12.5 INJECTION INTRAMUSCULAR; INTRAVENOUS; SUBCUTANEOUS EVERY 5 MIN PRN
Status: DISCONTINUED | OUTPATIENT
Start: 2023-02-21 | End: 2023-02-21 | Stop reason: HOSPADM

## 2023-02-21 RX ORDER — MIDAZOLAM HYDROCHLORIDE 2 MG/2ML
2 INJECTION, SOLUTION INTRAMUSCULAR; INTRAVENOUS ONCE
Status: COMPLETED | OUTPATIENT
Start: 2023-02-21 | End: 2023-02-21

## 2023-02-21 RX ORDER — ONDANSETRON 2 MG/ML
4 INJECTION INTRAMUSCULAR; INTRAVENOUS EVERY 4 HOURS PRN
Status: DISCONTINUED | OUTPATIENT
Start: 2023-02-21 | End: 2023-02-22 | Stop reason: HOSPADM

## 2023-02-21 RX ORDER — ATORVASTATIN CALCIUM 40 MG/1
40 TABLET, FILM COATED ORAL DAILY
Status: DISCONTINUED | OUTPATIENT
Start: 2023-02-21 | End: 2023-02-22 | Stop reason: HOSPADM

## 2023-02-21 RX ORDER — PROPOFOL 10 MG/ML
INJECTION, EMULSION INTRAVENOUS PRN
Status: DISCONTINUED | OUTPATIENT
Start: 2023-02-21 | End: 2023-02-21 | Stop reason: SDUPTHER

## 2023-02-21 RX ORDER — ASPIRIN 81 MG/1
81 TABLET ORAL 2 TIMES DAILY
Status: DISCONTINUED | OUTPATIENT
Start: 2023-02-21 | End: 2023-02-22 | Stop reason: HOSPADM

## 2023-02-21 RX ORDER — FENTANYL CITRATE 50 UG/ML
100 INJECTION, SOLUTION INTRAMUSCULAR; INTRAVENOUS ONCE
Status: COMPLETED | OUTPATIENT
Start: 2023-02-21 | End: 2023-02-21

## 2023-02-21 RX ORDER — CELECOXIB 100 MG/1
100 CAPSULE ORAL
Status: COMPLETED | OUTPATIENT
Start: 2023-02-21 | End: 2023-02-21

## 2023-02-21 RX ORDER — SODIUM CHLORIDE 0.9 % (FLUSH) 0.9 %
5-40 SYRINGE (ML) INJECTION PRN
Status: DISCONTINUED | OUTPATIENT
Start: 2023-02-21 | End: 2023-02-21 | Stop reason: HOSPADM

## 2023-02-21 RX ORDER — LIDOCAINE HYDROCHLORIDE 20 MG/ML
INJECTION, SOLUTION EPIDURAL; INFILTRATION; INTRACAUDAL; PERINEURAL PRN
Status: DISCONTINUED | OUTPATIENT
Start: 2023-02-21 | End: 2023-02-21 | Stop reason: SDUPTHER

## 2023-02-21 RX ORDER — APREPITANT 40 MG/1
40 CAPSULE ORAL
Status: COMPLETED | OUTPATIENT
Start: 2023-02-21 | End: 2023-02-21

## 2023-02-21 RX ORDER — METOCLOPRAMIDE HYDROCHLORIDE 5 MG/ML
10 INJECTION INTRAMUSCULAR; INTRAVENOUS
Status: DISCONTINUED | OUTPATIENT
Start: 2023-02-21 | End: 2023-02-21 | Stop reason: HOSPADM

## 2023-02-21 RX ORDER — POLYETHYLENE GLYCOL 3350 17 G/17G
17 POWDER, FOR SOLUTION ORAL DAILY
Status: DISCONTINUED | OUTPATIENT
Start: 2023-02-21 | End: 2023-02-22 | Stop reason: HOSPADM

## 2023-02-21 RX ORDER — GABAPENTIN 300 MG/1
600 CAPSULE ORAL DAILY
Status: DISCONTINUED | OUTPATIENT
Start: 2023-02-21 | End: 2023-02-22 | Stop reason: HOSPADM

## 2023-02-21 RX ORDER — HYDROMORPHONE HYDROCHLORIDE 1 MG/ML
0.5 INJECTION, SOLUTION INTRAMUSCULAR; INTRAVENOUS; SUBCUTANEOUS EVERY 5 MIN PRN
Status: COMPLETED | OUTPATIENT
Start: 2023-02-21 | End: 2023-02-21

## 2023-02-21 RX ORDER — TRANEXAMIC ACID 100 MG/ML
15 INJECTION, SOLUTION INTRAVENOUS
Status: COMPLETED | OUTPATIENT
Start: 2023-02-21 | End: 2023-02-21

## 2023-02-21 RX ORDER — LEVOTHYROXINE SODIUM 0.03 MG/1
25 TABLET ORAL
Status: DISCONTINUED | OUTPATIENT
Start: 2023-02-22 | End: 2023-02-22 | Stop reason: HOSPADM

## 2023-02-21 RX ORDER — KETOROLAC TROMETHAMINE 15 MG/ML
15 INJECTION, SOLUTION INTRAMUSCULAR; INTRAVENOUS EVERY 6 HOURS
Status: DISCONTINUED | OUTPATIENT
Start: 2023-02-21 | End: 2023-02-22 | Stop reason: HOSPADM

## 2023-02-21 RX ORDER — OXYCODONE HYDROCHLORIDE 5 MG/1
5 TABLET ORAL EVERY 4 HOURS PRN
Status: DISCONTINUED | OUTPATIENT
Start: 2023-02-21 | End: 2023-02-22 | Stop reason: HOSPADM

## 2023-02-21 RX ORDER — FAMOTIDINE 20 MG/1
20 TABLET, FILM COATED ORAL ONCE
Status: COMPLETED | OUTPATIENT
Start: 2023-02-21 | End: 2023-02-21

## 2023-02-21 RX ORDER — ACETAMINOPHEN 325 MG/1
650 TABLET ORAL
Status: COMPLETED | OUTPATIENT
Start: 2023-02-21 | End: 2023-02-21

## 2023-02-21 RX ORDER — FENTANYL CITRATE 50 UG/ML
INJECTION, SOLUTION INTRAMUSCULAR; INTRAVENOUS PRN
Status: DISCONTINUED | OUTPATIENT
Start: 2023-02-21 | End: 2023-02-21 | Stop reason: SDUPTHER

## 2023-02-21 RX ORDER — ACETAMINOPHEN 500 MG
1000 TABLET ORAL EVERY 8 HOURS SCHEDULED
Status: DISCONTINUED | OUTPATIENT
Start: 2023-02-21 | End: 2023-02-22 | Stop reason: HOSPADM

## 2023-02-21 RX ORDER — SODIUM CHLORIDE, SODIUM LACTATE, POTASSIUM CHLORIDE, CALCIUM CHLORIDE 600; 310; 30; 20 MG/100ML; MG/100ML; MG/100ML; MG/100ML
INJECTION, SOLUTION INTRAVENOUS CONTINUOUS
Status: DISCONTINUED | OUTPATIENT
Start: 2023-02-21 | End: 2023-02-22 | Stop reason: HOSPADM

## 2023-02-21 RX ORDER — SODIUM CHLORIDE 9 MG/ML
INJECTION, SOLUTION INTRAVENOUS PRN
Status: DISCONTINUED | OUTPATIENT
Start: 2023-02-21 | End: 2023-02-22 | Stop reason: HOSPADM

## 2023-02-21 RX ORDER — HYDROXYZINE HYDROCHLORIDE 10 MG/1
10 TABLET, FILM COATED ORAL EVERY 8 HOURS PRN
Status: DISCONTINUED | OUTPATIENT
Start: 2023-02-21 | End: 2023-02-22 | Stop reason: HOSPADM

## 2023-02-21 RX ORDER — SODIUM CHLORIDE, SODIUM LACTATE, POTASSIUM CHLORIDE, CALCIUM CHLORIDE 600; 310; 30; 20 MG/100ML; MG/100ML; MG/100ML; MG/100ML
INJECTION, SOLUTION INTRAVENOUS CONTINUOUS
Status: DISCONTINUED | OUTPATIENT
Start: 2023-02-21 | End: 2023-02-21 | Stop reason: HOSPADM

## 2023-02-21 RX ORDER — TOPIRAMATE 25 MG/1
50 TABLET ORAL DAILY
Status: DISCONTINUED | OUTPATIENT
Start: 2023-02-21 | End: 2023-02-22 | Stop reason: HOSPADM

## 2023-02-21 RX ORDER — CLONAZEPAM 0.5 MG/1
1 TABLET ORAL EVERY 12 HOURS PRN
Status: DISCONTINUED | OUTPATIENT
Start: 2023-02-21 | End: 2023-02-22 | Stop reason: HOSPADM

## 2023-02-21 RX ORDER — LAMOTRIGINE 100 MG/1
200 TABLET ORAL DAILY
Status: DISCONTINUED | OUTPATIENT
Start: 2023-02-21 | End: 2023-02-22 | Stop reason: HOSPADM

## 2023-02-21 RX ORDER — ROPIVACAINE HYDROCHLORIDE 2 MG/ML
30 INJECTION, SOLUTION EPIDURAL; INFILTRATION; PERINEURAL ONCE
Status: COMPLETED | OUTPATIENT
Start: 2023-02-21 | End: 2023-02-21

## 2023-02-21 RX ADMIN — FENTANYL CITRATE 25 MCG: 50 INJECTION, SOLUTION INTRAMUSCULAR; INTRAVENOUS at 07:56

## 2023-02-21 RX ADMIN — FENTANYL CITRATE 50 MCG: 50 INJECTION, SOLUTION INTRAMUSCULAR; INTRAVENOUS at 07:05

## 2023-02-21 RX ADMIN — FENTANYL CITRATE 25 MCG: 50 INJECTION, SOLUTION INTRAMUSCULAR; INTRAVENOUS at 08:11

## 2023-02-21 RX ADMIN — FENTANYL CITRATE 25 MCG: 50 INJECTION, SOLUTION INTRAMUSCULAR; INTRAVENOUS at 08:04

## 2023-02-21 RX ADMIN — DEXMEDETOMIDINE HYDROCHLORIDE 4 MCG: 100 INJECTION, SOLUTION INTRAVENOUS at 08:56

## 2023-02-21 RX ADMIN — LIDOCAINE HYDROCHLORIDE 2 ML: 10 INJECTION, SOLUTION EPIDURAL; INFILTRATION; INTRACAUDAL; PERINEURAL at 07:06

## 2023-02-21 RX ADMIN — OXYCODONE HYDROCHLORIDE 5 MG: 5 TABLET ORAL at 21:22

## 2023-02-21 RX ADMIN — BISACODYL 5 MG: 5 TABLET, COATED ORAL at 14:00

## 2023-02-21 RX ADMIN — VANCOMYCIN HYDROCHLORIDE 1000 MG: 1 INJECTION, POWDER, LYOPHILIZED, FOR SOLUTION INTRAVENOUS at 06:35

## 2023-02-21 RX ADMIN — CELECOXIB 100 MG: 100 CAPSULE ORAL at 06:22

## 2023-02-21 RX ADMIN — LAMOTRIGINE 200 MG: 100 TABLET ORAL at 20:05

## 2023-02-21 RX ADMIN — CEFAZOLIN 2000 MG: 1 INJECTION, POWDER, FOR SOLUTION INTRAMUSCULAR; INTRAVENOUS at 23:39

## 2023-02-21 RX ADMIN — APREPITANT 40 MG: 40 CAPSULE ORAL at 06:22

## 2023-02-21 RX ADMIN — FAMOTIDINE 20 MG: 20 TABLET ORAL at 06:22

## 2023-02-21 RX ADMIN — PROPOFOL 150 MG: 10 INJECTION, EMULSION INTRAVENOUS at 07:31

## 2023-02-21 RX ADMIN — FENTANYL CITRATE 25 MCG: 50 INJECTION, SOLUTION INTRAMUSCULAR; INTRAVENOUS at 11:16

## 2023-02-21 RX ADMIN — ROPIVACAINE HYDROCHLORIDE 30 ML: 2 INJECTION EPIDURAL; INFILTRATION; PERINEURAL at 07:09

## 2023-02-21 RX ADMIN — DEXMEDETOMIDINE HYDROCHLORIDE 4 MCG: 100 INJECTION, SOLUTION INTRAVENOUS at 09:24

## 2023-02-21 RX ADMIN — DEXAMETHASONE SODIUM PHOSPHATE 8 MG: 4 INJECTION, SOLUTION INTRAMUSCULAR; INTRAVENOUS at 07:54

## 2023-02-21 RX ADMIN — GABAPENTIN 600 MG: 300 CAPSULE ORAL at 14:01

## 2023-02-21 RX ADMIN — ACETAMINOPHEN 1000 MG: 500 TABLET ORAL at 14:00

## 2023-02-21 RX ADMIN — KETOROLAC TROMETHAMINE 15 MG: 15 INJECTION, SOLUTION INTRAMUSCULAR; INTRAVENOUS at 18:26

## 2023-02-21 RX ADMIN — SODIUM CHLORIDE, PRESERVATIVE FREE 10 ML: 5 INJECTION INTRAVENOUS at 21:18

## 2023-02-21 RX ADMIN — SODIUM CHLORIDE, POTASSIUM CHLORIDE, SODIUM LACTATE AND CALCIUM CHLORIDE: 600; 310; 30; 20 INJECTION, SOLUTION INTRAVENOUS at 06:38

## 2023-02-21 RX ADMIN — ATORVASTATIN CALCIUM 40 MG: 40 TABLET, FILM COATED ORAL at 14:02

## 2023-02-21 RX ADMIN — MIDAZOLAM 2 MG: 1 INJECTION INTRAMUSCULAR; INTRAVENOUS at 07:05

## 2023-02-21 RX ADMIN — ONDANSETRON 4 MG: 2 INJECTION INTRAMUSCULAR; INTRAVENOUS at 07:56

## 2023-02-21 RX ADMIN — KETOROLAC TROMETHAMINE 15 MG: 15 INJECTION, SOLUTION INTRAMUSCULAR; INTRAVENOUS at 23:40

## 2023-02-21 RX ADMIN — CEFAZOLIN 1 G: 330 INJECTION, POWDER, FOR SOLUTION INTRAMUSCULAR; INTRAVENOUS at 08:01

## 2023-02-21 RX ADMIN — OXYCODONE HYDROCHLORIDE 5 MG: 5 TABLET ORAL at 14:06

## 2023-02-21 RX ADMIN — ACETAMINOPHEN 650 MG: 325 TABLET, FILM COATED ORAL at 06:22

## 2023-02-21 RX ADMIN — ACETAMINOPHEN 1000 MG: 500 TABLET ORAL at 21:20

## 2023-02-21 RX ADMIN — DEXMEDETOMIDINE HYDROCHLORIDE 4 MCG: 100 INJECTION, SOLUTION INTRAVENOUS at 08:47

## 2023-02-21 RX ADMIN — CEFAZOLIN 2000 MG: 1 INJECTION, POWDER, FOR SOLUTION INTRAMUSCULAR; INTRAVENOUS at 18:24

## 2023-02-21 RX ADMIN — LIDOCAINE HYDROCHLORIDE 60 MG: 20 INJECTION, SOLUTION EPIDURAL; INFILTRATION; INTRACAUDAL; PERINEURAL at 07:31

## 2023-02-21 RX ADMIN — TRANEXAMIC ACID 1000 MG: 100 INJECTION, SOLUTION INTRAVENOUS at 07:54

## 2023-02-21 RX ADMIN — FENTANYL CITRATE 25 MCG: 50 INJECTION, SOLUTION INTRAMUSCULAR; INTRAVENOUS at 09:21

## 2023-02-21 RX ADMIN — HYDROMORPHONE HYDROCHLORIDE 0.5 MG: 1 INJECTION, SOLUTION INTRAMUSCULAR; INTRAVENOUS; SUBCUTANEOUS at 11:08

## 2023-02-21 RX ADMIN — TRANEXAMIC ACID 1000 MG: 100 INJECTION, SOLUTION INTRAVENOUS at 09:26

## 2023-02-21 RX ADMIN — TOPIRAMATE 50 MG: 25 TABLET, FILM COATED ORAL at 20:15

## 2023-02-21 RX ADMIN — HYDROMORPHONE HYDROCHLORIDE 0.5 MG: 1 INJECTION, SOLUTION INTRAMUSCULAR; INTRAVENOUS; SUBCUTANEOUS at 10:21

## 2023-02-21 RX ADMIN — ASPIRIN 81 MG: 81 TABLET, COATED ORAL at 20:15

## 2023-02-21 ASSESSMENT — PAIN DESCRIPTION - LOCATION
LOCATION: KNEE

## 2023-02-21 ASSESSMENT — PAIN DESCRIPTION - ORIENTATION
ORIENTATION: ANTERIOR
ORIENTATION: LEFT
ORIENTATION: ANTERIOR
ORIENTATION: LEFT

## 2023-02-21 ASSESSMENT — PAIN DESCRIPTION - PAIN TYPE
TYPE: SURGICAL PAIN

## 2023-02-21 ASSESSMENT — PAIN DESCRIPTION - DESCRIPTORS
DESCRIPTORS: ACHING;THROBBING;SHARP
DESCRIPTORS: ACHING
DESCRIPTORS: THROBBING;SORE
DESCRIPTORS: ACHING
DESCRIPTORS: SORE;THROBBING

## 2023-02-21 ASSESSMENT — PAIN SCALES - GENERAL
PAINLEVEL_OUTOF10: 5
PAINLEVEL_OUTOF10: 9
PAINLEVEL_OUTOF10: 6
PAINLEVEL_OUTOF10: 5
PAINLEVEL_OUTOF10: 8
PAINLEVEL_OUTOF10: 7
PAINLEVEL_OUTOF10: 6
PAINLEVEL_OUTOF10: 7
PAINLEVEL_OUTOF10: 8

## 2023-02-21 ASSESSMENT — PAIN SCALES - WONG BAKER
WONGBAKER_NUMERICALRESPONSE: 0
WONGBAKER_NUMERICALRESPONSE: 4
WONGBAKER_NUMERICALRESPONSE: 0
WONGBAKER_NUMERICALRESPONSE: 4
WONGBAKER_NUMERICALRESPONSE: 0
WONGBAKER_NUMERICALRESPONSE: 0

## 2023-02-21 ASSESSMENT — PAIN - FUNCTIONAL ASSESSMENT: PAIN_FUNCTIONAL_ASSESSMENT: 0-10

## 2023-02-21 NOTE — OP NOTE
Operative Note      Patient: Khris Gómez     Date of Surgery: 2/21/2023         YOB: 1980      Age:  38 y.o.        LOS:  LOS: 0 days       Preoperative Diagnosis:  Primary osteoarthritis of left knee [M17.12]    Postoperative Diagnosis: * No post-op diagnosis entered *      Surgeon:  Gloria Marroquin MD     Assistant:  Katia Marquez    Anesthesia:  general anesthesia and adductor block     Procedure:  Procedure(s):  LEFT TOTAL KNEE ARTHROPLASTY; [facility]; 2SA'S; NERVE BLOCK; 23HR    Time out performed: YES    Estimated Blood Loss:  min           Implants:    Implant Name Type Inv. Item Serial No.  Lot No. LRB No. Used Action   METACEM-X HV - [id number]  METACEM-X HV  [employer] NitroWD [id number] Left 1 Implanted   COMPONENT TIB SZ 3 LT FIX EDER GMK - [id number]  COMPONENT TIB SZ 3 LT FIX EDER GMK  [employer] [place]WD [id number] Left 1 Implanted   COMPONENT FEM SZ 4 SHIMA LT POST STBL EDER GMK - [id number]  COMPONENT FEM SZ 4 SHIMA LT POST STBL EDER GMK  [employer]-WD [id number] Left 1 Implanted   COMPONENT PAT SZ 2 RESURF AUG GMK - [id number]  COMPONENT PAT SZ 2 RESURF AUG GMK  [employer] NitroWD [id number] Left 1 Implanted   extension stem     [employer]WD [id number] Left 1 Implanted   INSERT TIB SZ 3 [id number] POST STBL FIX GMK - [id number]  INSERT TIB SZ 3 [id number] POST STBL FIX GMK  [employer] NitroWD [id number] Left 1 Implanted       Specimens: * No specimens in log *            Complications:  None    DESCRIPTION OF PROCEDURE: After satisfactory general and adductor block anesthesia, in the supine position, patient's knee was prepped with ChloraPrep solution and draped in the usual fashion for knee replacement. The tourniquet was inflated to 350 mmHg after exsanguination and elevation. A longitudinal anterior skin incision was made carried down through the subcutaneus tissue to the underlying extensor mechanism.  A medial parapatellar arthrotomy was carried proximally in a subvastus approach sparing the vastus medialis obliqus insertion on the quadriceps tendon. The patella was everted and retracted laterally as the knee was flexed. An anterior joint line debridement was carried out. The proximal tibial cut was made using the extramedullary tibial cutting guide and an oscillating saw. The distal femoral cuts were made using an intramedullary femoral cutting guide and a 6 degree cutting angle based on preoperative x-ray measurements of the femur. The distal femoral resection was set at 9 mm. The bone mill notch preparation was made for a #4 narrow  posterior stabilized femoral component. Osteophytes were removed from the joint margins including the posterior femoral condyles. The tibial stem hole and fin impressions were made for the #3 tibial base plate. The patella was cut to recreate the native patellar thickness of 24 mm. Three drill holes were placed in the patella for a #2 three pegged all polyethylene patellar component. Trial components were inserted and the knee was found to have full extension and good stability with a 12 mm tibial spacer in place. Trial components were then removed and the knee was irrigated thoroughly. The knee was injected with 266 mg/20 cc Exparel mixed with 40 ml saline. The actual components were then cemented into place with gentamycin bone cement. All excess cement was carefully removed. The knee was held in extension with a clamp on the patella. Once the cement fully hardened, the knee was again checked for excess cement. The permanent PS E poly tibial spacer was attached to the tibial base plate with a locking screw. The knee was reduced and was found to be stable with full knee extension, good stability, and mid line patellar tracking with no thumb pressure applied. The knee was again irrigated thoroughly with Irricept and Pulsavac saline lavage. The tourniquet was deflated and several synovial bleeding vessels were electrocoagulated.   Closure was obtained using #2 Vicryl interrupted sutures for the capsular closure, 2-0 Vicryl suture for the subcutaneous tissues and staples for the skin. A sterile dressing was applied and Ms. Zoila Carlton was transported to the recovery room in good condition. Sponge and needle count was correct.

## 2023-02-21 NOTE — PLAN OF CARE
Problem: Physical Therapy - Adult  Goal: By Discharge: Performs mobility at highest level of function for planned discharge setting. See evaluation for individualized goals. Description: Physical Therapy Goals  Initiated 2/21/2023 and to be accomplished within 7 day(s)  1. Patient will move from supine to sit and sit to supine in bed with modified independence. 2.  Patient will transfer from bed to chair and chair to bed with modified independence using the least restrictive device. 3.  Patient will perform sit to stand with modified independence. 4.  Patient will ambulate with modified independence for 150 feet with the least restrictive device. PLOF: Lives with adult daughter; mother to stay with patient at discharge. One story home with no steps to enter. Has cane and ww. Outcome: Progressing    PHYSICAL THERAPY EVALUATION    Patient: Nelda Marquez (25 y.o. female)  Date: 2/21/2023  Primary Diagnosis: Primary osteoarthritis of left knee [M17.12]  Osteoarthritis of left knee, unspecified osteoarthritis type [M17.12]  S/P total knee arthroplasty, left [Z96.652]  Procedure(s) (LRB):  LEFT TOTAL KNEE ARTHROPLASTY; [facility]; 2SA'S; NERVE BLOCK; 23HR (Left) Day of Surgery   Precautions: Weight Bearing, Fall Risk, Left Lower Extremity Weight Bearing: Weight Bearing As Tolerated  ASSESSMENT :  Underwent left TKA 02/21/2023. WBAT LLE. No quad set on LLE; educated patient on quad set exercise. Min A for supine to sit. Additional time d/t pain 8/10 left knee. Seated EOB with good balance. No active left knee extension, minimal active knee flexion. Educated on safety with OOB mobility and use of ww. Voices need for restroom. Min A for sit to stand and transfer to bedside commode. Stand pivot transfer with RLE; NWB LLE patient preference d/t pain. Returned to seated EOB. Min A for sit to supine. Seated in bed. Towel roll under left ankle to encourage extension. Ice to left knee. SCD to RLE. Call bell in reach. AMARI Daley aware. Patient is NOT cleared by PT for discharge. Will follow to progress amb and trial stairs. DEFICITS/IMPAIRMENTS:   Body Structures, Functions, Activity Limitations Requiring Skilled Therapeutic Intervention: Decreased functional mobility ; Decreased ROM; Decreased strength;Decreased balance;Decreased endurance;Decreased safe awareness    Patient will benefit from skilled intervention to address the above impairments. Patient's rehabilitation potential Therapy Prognosis: Fair. Factors which may influence rehabilitation potential include:   []         None noted  []         Mental ability/status  [x]         Medical condition  []         Home/family situation and support systems  []         Safety awareness  [x]         Pain tolerance/management  []         Other:      PLAN :  Recommendations and Planned Interventions:   [x]           Bed Mobility Training             [x]    Neuromuscular Re-Education  [x]           Transfer Training                   []    Orthotic/Prosthetic Training  [x]           Gait Training                          []    Modalities  [x]           Therapeutic Exercises           []    Edema Management/Control  [x]           Therapeutic Activities            [x]    Family Training/Education  [x]           Patient Education  []           Other (comment):    Frequency/Duration: Patient will be followed by physical therapy to address goals, 1-2 times per day/3-5 days per week to address goals. Further Equipment Recommendations for Discharge: rolling walker    Discharge Recommendations: Home with Home health PT    AMPAC: 15/20    This AMPAC score should be considered in conjunction with interdisciplinary team recommendations to determine the most appropriate discharge setting. Patient's social support, diagnosis, medical stability, and prior level of function should also be taken into consideration.     Current research shows that an AM-PAC score of 18 (14 without stairs) or greater is associated with a discharge to the patient's home setting. Based on an AM-PAC score of 15/20 if omitting stairs and their current functional mobility deficits, it is recommended that the patient have 2-3 sessions per week of Physical Therapy at d/c to increase the patient's independence. SUBJECTIVE:   Patient stated I need some stronger pain meds. 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    Thyroid disease      Past Surgical History:   Procedure Laterality Date    APPENDECTOMY       SECTION      x2    HEENT Bilateral     ears x4    HYSTERECTOMY (CERVIX STATUS UNKNOWN)      KNEE SURGERY Left     x2    OTHER SURGICAL HISTORY Left     small mass removed from L groin    OVARIAN CYST REMOVAL Bilateral      Home Situation:  Social/Functional History  Lives With: Daughter, Parent  Type of Home: House  Home Layout: One level  Home Access: Level entry  Home Equipment: [employer] [place], Cane  Critical Behavior:  Orientation Level: Oriented X4    Strength:    Strength: Generally decreased, functional    Range Of Motion:  AROM: Generally decreased, functional    Functional Mobility:  Bed Mobility:  Bed Mobility Training  Bed Mobility Training: Yes  Supine to Sit: Minimum assistance  Sit to Supine: Minimum assistance  Transfers:  Transfer Training  Transfer Training: Yes  Stand to Sit: Minimum assistance  Stand Pivot Transfers: Minimum assistance  Bed to Chair: Minimum assistance  Balance:   Balance  Sitting: Impaired  Sitting - Static: Good (unsupported)  Sitting - Dynamic: Good (unsupported)  Standing: Impaired  Standing - Static: Fair  Standing - Dynamic: Fair    Pain:  Pain level pre-treatment: 8/10   Pain level post-treatment: 8/10     Activity Tolerance:   Activity Tolerance: Patient limited by pain  Please refer to the flowsheet for vital signs taken during this treatment.     After treatment:   [] Patient left in no apparent distress sitting up in chair  [x]         Patient left in no apparent distress in bed  [x]         Call bell left within reach  [x]         Nursing notified  []         Caregiver present  []         Bed alarm activated  []         SCDs applied    COMMUNICATION/EDUCATION:   Patient Education  Education Given To: Patient  Education Provided: Role of Therapy; Energy Conservation; Fall Prevention Strategies; Plan of Care;Home Exercise Program;Transfer Training;Equipment  Education Method: Demonstration;Verbal;Teach Back  Barriers to Learning: None  Education Outcome: Verbalized understanding;Demonstrated understanding    Thank you for this referral.  Helder Kovacs, PT  Minutes: 32      Eval Complexity: Decision Making: Medium Complexity    [address] [place] [id number] AM-PAC® Basic Mobility Inpatient Short Form (6-Clicks) Version 2    How much HELP from another person does the patient currently need    (If the patient hasn't done an activity recently, how much help from another person do you think he/she would need if he/she tried?)   Total (Total A or Dep)   A Lot  (Mod to Max A)   A Little (Sup or Min A)   None (Mod I to I)   Turning from your back to your side while in a flat bed without using bedrails? [] 1 [] 2 [x] 3 [] 4   2. Moving from lying on your back to sitting on the side of a flat bed without using bedrails? [] 1 [] 2 [x] 3 [] 4   3. Moving to and from a bed to a chair (including a wheelchair)? [] 1 [] 2 [x] 3 [] 4   4. Standing up from a chair using your arms (e.g., wheelchair, or bedside chair)? [] 1 [] 2 [x] 3 [] 4   5. Walking in hospital room? [] 1 [] 2 [x] 3 [] 4   6. Climbing 3-5 steps with a railing?+  Not tested d/t none in home set-up [] 1 [] 2 [] 3 [] 4   +If stair climbing cannot be assessed, skip item #6. Sum responses from items 1-5.

## 2023-02-21 NOTE — INTERVAL H&P NOTE
Update History & Physical    The patient's History and Physical of February 21, 2023 was reviewed with the patient and I examined the patient. There was no change. The surgical site was confirmed by the patient and me. Plan: The risks, benefits, expected outcome, and alternative to the recommended procedure have been discussed with the patient. Patient understands and wants to proceed with the procedure.      Electronically signed by Jt Meier MD on 2/21/2023 at 7:00 AM

## 2023-02-21 NOTE — ANESTHESIA PRE PROCEDURE
Department of Anesthesiology  Preprocedure Note       Name:  Shermon Meigs   Age:  38 y.o.  :  1980                                          MRN:  [id number]         Date:  2023      Surgeon: Dennis Mart):  Estella Comer MD    Procedure: Procedure(s):  LEFT TOTAL KNEE ARTHROPLASTY; [facility]; 2SA'S; NERVE BLOCK; 23HR    Medications prior to admission:   Prior to Admission medications    Medication Sig Start Date End Date Taking? Authorizing Provider   gabapentin (NEURONTIN) 600 MG tablet Take 600 mg by mouth daily. At night   Yes Historical Provider, MD   atorvastatin (LIPITOR) 40 MG tablet TAKE 1 TABLET BY MOUTH EVERY DAY 22   Historical Provider, MD   clonazePAM (KLONOPIN) 1 MG tablet Take 1 mg by mouth. As needed  Patient not taking: Reported on 2023    Historical Provider, MD   lamoTRIgine (LAMICTAL) 200 MG tablet lamotrigine 200 mg tablet   TAKE 1 TABLET BY MOUTH EVERY DAY    Historical Provider, MD   topiramate (TOPAMAX) 25 MG tablet TAKE 3 TABLETS BY MOUTH AT BEDTIME 22   Historical Provider, MD   celecoxib (CELEBREX) 200 MG capsule Take 200 mg by mouth daily    Ar Automatic Reconciliation   cyclobenzaprine (FLEXERIL) 10 MG tablet Take 10 mg by mouth 3 times daily as needed    Ar Automatic Reconciliation   diclofenac sodium (VOLTAREN) 1 % GEL Apply 4 grams to the left knee up to 4 times per day, maximum 16 grams per joint per day. Dispense 5 100 gram tubes 22   Ar Automatic Reconciliation   ergocalciferol (ERGOCALCIFEROL) 1.25 MG (24982 UT) capsule Take 50,000 Units by mouth every 7 days    Ar Automatic Reconciliation   gabapentin (NEURONTIN) 300 MG capsule Take 300 mg by mouth 3 times daily.     Ar Automatic Reconciliation   levothyroxine (SYNTHROID) 25 MCG tablet Take 25 mcg by mouth every morning (before breakfast) Saturday and  50mcg    Ar Automatic Reconciliation       Current medications:    Current Facility-Administered Medications   Medication Dose Route Frequency Provider Last Rate Last Admin    lactated ringers IV soln infusion   IntraVENous Continuous HIRAM Hermosillo CRNA 125 mL/hr at 23 3771 New Bag at 23 3954    sodium chloride flush 0.9 % injection 5-40 mL  5-40 mL IntraVENous PRN HIRAM [place]illo CRNA        ceFAZolin (ANCEF) injection 1,000 mg  1,000 mg IntraMUSCular On Call to [address] [place] [address], GLADYS        gabapentin (NEURONTIN) capsule 100 mg  100 mg Oral On Call to [address] [place] [address], PA-NICOLE        vancomycin (VANCOCIN) 1,000 mg in sodium chloride 0.9 % 250 mL (vial-mate) IVPB  1,000 mg IntraVENous On Call to [address] [place] [address], PA-C 125 mL/hr at 23 0635 1,000 mg at 23 9248    bupivacaine liposome (EXPAREL) 1.3 % injection 66.5 mg  5 mL SubCUTAneous On Call to [address] [place] [address], GLADYS        tranexamic acid (CYKLOKAPRON) injection 1,430 mg  15 mg/kg IntraVENous On Call to [address] [place] [address], GLADYS           Allergies:  No Known Allergies    Problem List:    Patient Active Problem List   Diagnosis Code    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    Pelvic pain R10.2    Menorrhagia N92.0       Past Medical History:        Diagnosis Date    High cholesterol     Ill-defined condition     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    Thyroid disease        Past Surgical History:        Procedure Laterality Date    APPENDECTOMY       SECTION      x2    HEENT Bilateral     ears x4    HYSTERECTOMY (CERVIX STATUS UNKNOWN)      KNEE SURGERY Left     x2    OTHER SURGICAL HISTORY Left     small mass removed from L groin    OVARIAN CYST REMOVAL Bilateral        Social History:    Social History     Tobacco Use    Smoking status: Former     Packs/day: 0.25     Types: Cigarettes    Smokeless tobacco: Never   Substance Use Topics    Alcohol use: Never                                Counseling given: Not Answered      Vital Signs (Current):   Vitals:    23 1025 23 0655 23 0657   BP:  (!) 115/49 101/60   Pulse:  82 86   Resp:   Temp:  97.9 °F (36.6 °C)    TempSrc:  Oral    SpO2:  97%    Weight: 210 lb (95.3 kg) 208 lb 6.4 oz (94.5 kg)    Height: 5' 6\" (1.676 m) 5' 6\" (1.676 m)                                               BP Readings from Last 3 Encounters:   02/21/23 101/60       NPO Status: Time of last liquid consumption: 2000                        Time of last solid consumption: 2000                        Date of last liquid consumption: 02/21/23                        Date of last solid food consumption: 02/20/23    BMI:   Wt Readings from Last 3 Encounters:   02/21/23 208 lb 6.4 oz (94.5 kg)   02/15/23 210 lb (95.3 kg)   06/22/22 213 lb (96.6 kg)     Body mass index is 33.64 kg/m². CBC:   Lab Results   Component Value Date/Time    WBC 11.3 02/06/2023 03:36 PM    RBC 4.37 02/06/2023 03:36 PM    HGB 12.7 02/06/2023 03:36 PM    HCT 39.6 02/06/2023 03:36 PM    MCV 90.6 02/06/2023 03:36 PM    RDW 14.0 02/06/2023 03:36 PM     02/06/2023 03:36 PM       CMP:   Lab Results   Component Value Date/Time     02/06/2023 03:36 PM    K 4.0 02/06/2023 03:36 PM     02/06/2023 03:36 PM    CO2 28 02/06/2023 03:36 PM    BUN 13 02/06/2023 03:36 PM    CREATININE 0.83 02/06/2023 03:36 PM    GFRAA >60 06/30/2022 08:45 AM    AGRATIO 1.4 02/06/2023 03:36 PM    GLUCOSE 113 02/06/2023 03:36 PM    PROT 7.3 02/06/2023 03:36 PM    CALCIUM 10.0 02/06/2023 03:36 PM    BILITOT 0.3 02/06/2023 03:36 PM    ALKPHOS 111 02/06/2023 03:36 PM    AST 25 02/06/2023 03:36 PM    ALT 31 02/06/2023 03:36 PM       POC Tests: No results for input(s): POCGLU, POCNA, POCK, POCCL, POCBUN, POCHEMO, POCHCT in the last 72 hours.     Coags:   Lab Results   Component Value Date/Time    PROTIME 13.6 02/06/2023 03:36 PM    INR 1.0 02/06/2023 03:36 PM    APTT 29.4 02/06/2023 03:36 PM       HCG (If Applicable): No results found for: PREGTESTUR, PREGSERUM, HCG, HCGQUANT     ABGs: No results found for: PHART, PO2ART, GBQ8VME, ARZ1DFU, BEART, A9LEUIJA     Type & Screen (If Applicable):  No results found for: LABABO, LABRH    Drug/Infectious Status (If Applicable):  No results found for: HIV, HEPCAB    COVID-19 Screening (If Applicable):   Lab Results   Component Value Date/Time    COVID19 Not detected 02/20/2023 01:15 PM           Anesthesia Evaluation  Patient summary reviewed and Nursing notes reviewed no history of anesthetic complications:   Airway: Mallampati: III  TM distance: <3 FB   Neck ROM: full  Mouth opening: > = 3 FB   Dental: normal exam         Pulmonary:Negative Pulmonary ROS and normal exam                               Cardiovascular:Negative CV ROS                      Neuro/Psych:               GI/Hepatic/Renal: Neg GI/Hepatic/Renal ROS            Endo/Other: Negative Endo/Other ROS                    Abdominal:             Vascular: negative vascular ROS. Other Findings:           Anesthesia Plan      regional and general     ASA 2       Induction: intravenous. Anesthetic plan and risks discussed with patient.                         Chelsey Garvey MD   2/21/2023

## 2023-02-21 NOTE — ANESTHESIA PROCEDURE NOTES
Peripheral Block    Patient location during procedure: pre-op  Reason for block: post-op pain management and at surgeon's request  Start time: 2/21/2023 7:06 AM  End time: 2/21/2023 7:09 AM  Staffing  Performed: anesthesiologist   Anesthesiologist: Yee Gallardo MD  Preanesthetic Checklist  Completed: patient identified, IV checked, site marked, risks and benefits discussed, surgical/procedural consents, equipment checked, pre-op evaluation, timeout performed, anesthesia consent given, oxygen available, monitors applied/VS acknowledged, fire risk safety assessment completed and verbalized and blood product R/B/A discussed and consented  Peripheral Block   Patient position: sitting  Prep: ChloraPrep  Provider prep: mask and sterile gloves  Patient monitoring: cardiac monitor, continuous pulse ox, continuous capnometry, frequent blood pressure checks, IV access, oxygen and responsive to questions  Block type: Saphenous  Laterality: left  Injection technique: single-shot  Guidance: ultrasound guided  Local infiltration: ropivacaine  Infiltration strength: 0.5 %  Local infiltration: ropivacaine  Dose: 30 mL    Needle   Needle type: insulated echogenic nerve stimulator needle   Needle gauge: 21 G  Needle localization: ultrasound guidance  Needle length: 10 cm  Assessment   Injection assessment: negative aspiration for heme, no paresthesia on injection, local visualized surrounding nerve on ultrasound and no intravascular symptoms  Paresthesia pain: none  Slow fractionated injection: yes  Hemodynamics: stable  Real-time US image taken/store: yes  Outcomes: uncomplicated and patient tolerated procedure well

## 2023-02-21 NOTE — NURSE NAVIGATOR
Rounded on patient s/p left total knee replacement with Dr. Arabella Booker, [facility] 02/21/2023. Patient observed to be alert and oriented x 3, sitting up in bed. She denies chest pain, shortness of breath,nausea, vomiting or calf pain. She is rating her pain at 5/10 at this time. Ace wrap noted to left lower extremity, dressing underneath noted as clean, dry and intact. Patient has full feeling in left lower extremity and is able to purposefully move left foot. She is limited by pain to move her leg. Ice pack applied for comfort. Per patient , she was just medicated by nursing for pain. Total knee replacement education book provided to patient along with education regarding the use of incentive spirometry. Education regarding postoperative showering reviewed. Patient reminded that she may remove ace wrap as dressing is water  proof, no tubs or submersions in water allowed. Encouraged 10 minutes of ambulation hourly followed by 20 minutes of icing, not to be placed directly on her skin. Reinforced the importance of keeping knee straight when in bed or in recliner. Patient verbalized understanding of all information provided. All questions answered. Will continue to monitor.

## 2023-02-21 NOTE — ANESTHESIA POSTPROCEDURE EVALUATION
Department of Anesthesiology  Postprocedure Note    Patient: Katie Licea  MRN: [id number]  YOB: 1980  Date of evaluation: 2/21/2023      Procedure Summary     Date: 02/21/23 Room / Location: [facility] MAIN 06 / [facility] MAIN OR    Anesthesia Start: 0726 Anesthesia Stop: 1008    Procedure: LEFT TOTAL KNEE ARTHROPLASTY; [facility]; 2SA'S; NERVE BLOCK; 23HR (Left: Knee) Diagnosis:       Primary osteoarthritis of left knee      (Primary osteoarthritis of left knee [M17.12])    Surgeons: Angy Gibson MD Responsible Provider: Sera Wood MD    Anesthesia Type: General, Regional ASA Status: 2          Anesthesia Type: General, Regional    Rene Phase I: Rene Score: 8    Rene Phase II:        Anesthesia Post Evaluation    Patient location during evaluation: PACU  Patient participation: complete - patient participated  Level of consciousness: sleepy but conscious  Pain score: 0  Airway patency: patent  Nausea & Vomiting: no nausea and no vomiting  Complications: no  Cardiovascular status: blood pressure returned to baseline  Respiratory status: acceptable  Hydration status: euvolemic

## 2023-02-21 NOTE — PROGRESS NOTES
1245- Patient arrived to unit from PACU. Vital signs taken. SCDs are on. IV fluids infusing. Family at bedside. Call bell within  reach. Will continue to monitor. 1400-  Patient  received scheduled medication  and  pain  medication. Patient tolerated  regular diet. Denies nausea. Will continue to monitor.

## 2023-02-21 NOTE — PROGRESS NOTES
conducted a pre-surgery visit with Rafi Levine, who is a 38 y.o.,female. The  provided the following Interventions:  Initiated a relationship of care and support. Plan:  Chaplains will continue to follow and will provide pastoral care on an as needed/requested basis.  recommends bedside caregivers page  on duty if patient shows signs of acute spiritual or emotional distress.     [address]  [phone]

## 2023-02-21 NOTE — OP NOTE
Operative Note      Patient: Leonardo Bacon  YOB: 1980  MRN: [id number]    Date of Procedure: 2/21/2023    Pre-Op Diagnosis: Primary osteoarthritis of left knee [M17.12]    Post-Op Diagnosis: Same       Procedure(s):  LEFT TOTAL KNEE ARTHROPLASTY; [facility]; 2SA'S; NERVE BLOCK; 23HR    Surgeon(s):  Jovani Prabhakar MD    Assistant:   Surgical Assistant: Fern Cooper    Anesthesia: General    Estimated Blood Loss (mL): less than 50     Complications: None    Specimens:   * No specimens in log *    Implants:  Implant Name Type Inv.  Item Serial No.  Lot No. LRB No. Used Action   METACEM-X HV - [id number]  METACEM-X HV  [employer] SmartShoot- [id number] Left 1 Implanted   COMPONENT TIB SZ 3 LT FIX EDER GMK - [id number]  COMPONENT TIB SZ 3 LT FIX EDER GMK  [employer] [place]- [id number] Left 1 Implanted   COMPONENT FEM SZ 4 SHIMA LT POST STBL EDER GMK - [id number]  COMPONENT FEM SZ 4 SHIMA LT POST STBL EDER GMK  [employer] Vault DragonWD [id number] Left 1 Implanted   COMPONENT PAT SZ 2 RESURF AUG GMK - [id number]  COMPONENT PAT SZ 2 RESURF AUG GMK  [employer]WD [id number] Left 1 Implanted   extension stem     [employer] [place]- [id number] Left 1 Implanted   INSERT TIB SZ 3 [id number] POST STBL FIX GMK - [id number]  INSERT TIB SZ 3 [id number] POST STBL FIX GMK  [employer]ACT[employer] SmartShoot-WD [id number] Left 1 Implanted         Drains: * No LDAs found *    Findings: above    Detailed Description of Procedure: see op note    Electronically signed by Jovani Prabhakar MD on 2/21/2023 at 9:43 AM

## 2023-02-22 VITALS
HEIGHT: 66 IN | OXYGEN SATURATION: 100 % | BODY MASS INDEX: 33.49 KG/M2 | DIASTOLIC BLOOD PRESSURE: 59 MMHG | SYSTOLIC BLOOD PRESSURE: 105 MMHG | TEMPERATURE: 97.6 F | RESPIRATION RATE: 18 BRPM | HEART RATE: 85 BPM | WEIGHT: 208.4 LBS

## 2023-02-22 PROBLEM — M17.12 OSTEOARTHRITIS OF LEFT KNEE, UNSPECIFIED OSTEOARTHRITIS TYPE: Status: RESOLVED | Noted: 2023-02-21 | Resolved: 2023-02-22

## 2023-02-22 PROCEDURE — 96374 THER/PROPH/DIAG INJ IV PUSH: CPT

## 2023-02-22 PROCEDURE — G0378 HOSPITAL OBSERVATION PER HR: HCPCS

## 2023-02-22 PROCEDURE — 97535 SELF CARE MNGMENT TRAINING: CPT

## 2023-02-22 PROCEDURE — 6360000002 HC RX W HCPCS: Performed by: SPECIALIST

## 2023-02-22 PROCEDURE — 2580000003 HC RX 258: Performed by: SPECIALIST

## 2023-02-22 PROCEDURE — 97530 THERAPEUTIC ACTIVITIES: CPT

## 2023-02-22 PROCEDURE — 97110 THERAPEUTIC EXERCISES: CPT

## 2023-02-22 PROCEDURE — 97116 GAIT TRAINING THERAPY: CPT

## 2023-02-22 PROCEDURE — 6370000000 HC RX 637 (ALT 250 FOR IP): Performed by: SPECIALIST

## 2023-02-22 PROCEDURE — 97165 OT EVAL LOW COMPLEX 30 MIN: CPT

## 2023-02-22 RX ORDER — DOCUSATE SODIUM 100 MG/1
100 CAPSULE, LIQUID FILLED ORAL 2 TIMES DAILY
Qty: 60 CAPSULE | Refills: 0 | Status: SHIPPED | OUTPATIENT
Start: 2023-02-22 | End: 2023-03-06

## 2023-02-22 RX ORDER — ACETAMINOPHEN 500 MG
1000 TABLET ORAL 3 TIMES DAILY
Qty: 120 TABLET | Refills: 1 | Status: SHIPPED | OUTPATIENT
Start: 2023-02-22

## 2023-02-22 RX ORDER — TRAMADOL HYDROCHLORIDE 50 MG/1
50 TABLET ORAL EVERY 6 HOURS PRN
Status: DISCONTINUED | OUTPATIENT
Start: 2023-02-22 | End: 2023-02-22 | Stop reason: HOSPADM

## 2023-02-22 RX ORDER — TRAMADOL HYDROCHLORIDE 50 MG/1
50 TABLET ORAL EVERY 6 HOURS PRN
Qty: 30 TABLET | Refills: 0 | Status: SHIPPED | OUTPATIENT
Start: 2023-02-22 | End: 2023-03-06

## 2023-02-22 RX ORDER — ASPIRIN 81 MG/1
81 TABLET ORAL 2 TIMES DAILY
Qty: 90 TABLET | Refills: 1 | Status: SHIPPED | OUTPATIENT
Start: 2023-02-22

## 2023-02-22 RX ORDER — PANTOPRAZOLE SODIUM 40 MG/1
40 TABLET, DELAYED RELEASE ORAL DAILY
Qty: 60 TABLET | Refills: 5 | Status: SHIPPED | OUTPATIENT
Start: 2023-02-22

## 2023-02-22 RX ORDER — OXYCODONE HYDROCHLORIDE 5 MG/1
5 TABLET ORAL
Qty: 28 TABLET | Refills: 0 | Status: SHIPPED | OUTPATIENT
Start: 2023-02-22 | End: 2023-03-01

## 2023-02-22 RX ADMIN — SODIUM CHLORIDE, SODIUM LACTATE, POTASSIUM CHLORIDE, CALCIUM CHLORIDE AND DEXTROSE MONOHYDRATE: 5; 600; 310; 30; 20 INJECTION, SOLUTION INTRAVENOUS at 03:19

## 2023-02-22 RX ADMIN — POLYETHYLENE GLYCOL 3350 17 G: 17 POWDER, FOR SOLUTION ORAL at 12:55

## 2023-02-22 RX ADMIN — TRAMADOL HYDROCHLORIDE 50 MG: 50 TABLET, COATED ORAL at 08:41

## 2023-02-22 RX ADMIN — ATORVASTATIN CALCIUM 40 MG: 40 TABLET, FILM COATED ORAL at 08:42

## 2023-02-22 RX ADMIN — SODIUM CHLORIDE, PRESERVATIVE FREE 10 ML: 5 INJECTION INTRAVENOUS at 12:56

## 2023-02-22 RX ADMIN — LEVOTHYROXINE SODIUM 25 MCG: 25 TABLET ORAL at 06:17

## 2023-02-22 RX ADMIN — ACETAMINOPHEN 1000 MG: 500 TABLET ORAL at 06:14

## 2023-02-22 RX ADMIN — BISACODYL 5 MG: 5 TABLET, COATED ORAL at 08:42

## 2023-02-22 RX ADMIN — GABAPENTIN 600 MG: 300 CAPSULE ORAL at 08:41

## 2023-02-22 RX ADMIN — KETOROLAC TROMETHAMINE 15 MG: 15 INJECTION, SOLUTION INTRAMUSCULAR; INTRAVENOUS at 06:15

## 2023-02-22 RX ADMIN — OXYCODONE HYDROCHLORIDE 5 MG: 5 TABLET ORAL at 03:13

## 2023-02-22 RX ADMIN — OXYCODONE HYDROCHLORIDE 5 MG: 5 TABLET ORAL at 09:54

## 2023-02-22 RX ADMIN — KETOROLAC TROMETHAMINE 15 MG: 15 INJECTION, SOLUTION INTRAMUSCULAR; INTRAVENOUS at 12:57

## 2023-02-22 RX ADMIN — ASPIRIN 81 MG: 81 TABLET, COATED ORAL at 08:42

## 2023-02-22 ASSESSMENT — PAIN SCALES - GENERAL
PAINLEVEL_OUTOF10: 6
PAINLEVEL_OUTOF10: 5
PAINLEVEL_OUTOF10: 9
PAINLEVEL_OUTOF10: 6
PAINLEVEL_OUTOF10: 5
PAINLEVEL_OUTOF10: 5

## 2023-02-22 ASSESSMENT — PAIN SCALES - WONG BAKER: WONGBAKER_NUMERICALRESPONSE: 4

## 2023-02-22 ASSESSMENT — PAIN DESCRIPTION - LOCATION
LOCATION: KNEE

## 2023-02-22 ASSESSMENT — PAIN DESCRIPTION - DESCRIPTORS: DESCRIPTORS: ACHING;SHARP;THROBBING

## 2023-02-22 ASSESSMENT — PAIN DESCRIPTION - ORIENTATION: ORIENTATION: LEFT

## 2023-02-22 ASSESSMENT — PAIN DESCRIPTION - PAIN TYPE: TYPE: SURGICAL PAIN

## 2023-02-22 NOTE — PROGRESS NOTES
Discharge/ Transition Planning      Patient accepted to [facility].  Booked referral and updated AVS       Bandar Anderson RN BSN  /Discharge Planner

## 2023-02-22 NOTE — PLAN OF CARE
Problem: Physical Therapy - Adult  Goal: By Discharge: Performs mobility at highest level of function for planned discharge setting. See evaluation for individualized goals. Description: Physical Therapy Goals  Initiated 2/21/2023 and to be accomplished within 7 day(s)  1. Patient will move from supine to sit and sit to supine in bed with modified independence. 2.  Patient will transfer from bed to chair and chair to bed with modified independence using the least restrictive device. 3.  Patient will perform sit to stand with modified independence. 4.  Patient will ambulate with modified independence for 150 feet with the least restrictive device. PLOF: Lives with adult daughter; mother to stay with patient at discharge. One story home with no steps to enter. Has cane and ww. Outcome: Progressing          PHYSICAL THERAPY TREATMENT    Patient: Lorrie Freeman (13 y.o. female)  Date: 2/22/2023  Diagnosis: Primary osteoarthritis of left knee [M17.12]  Osteoarthritis of left knee, unspecified osteoarthritis type [M17.12]  S/P total knee arthroplasty, left [Z96.652] Osteoarthritis of left knee, unspecified osteoarthritis type  Procedure(s) (LRB):  LEFT TOTAL KNEE ARTHROPLASTY; MEDACTA; 2SA'S; NERVE BLOCK; 23HR (Left) 1 Day Post-Op  Precautions: Fall Risk,  , Left Lower Extremity Weight Bearing: Weight Bearing As Tolerated,  ,  ,  ,  ,    PLOF: see above     ASSESSMENT:  Pt is limited by pain in left knee resulting in decreased independence with functional mobility and limited ROM in left knee. Pt is transferring at a CGA/SBA level with RW. Pt was able to initiate ambulation and ambulated 12 feet with RW and CGA with verbal cues to attempt normal gait motions on the left LE and use Ues to take weight off her her left leg as needed but not to toe walk. Pt performed LE exercises with assistance as needed as noted below and was educated on positioning.   Pt verbalized understanding of Le exercises and positioning. Pt was left in bed with needs in reach. Pt is not cleared for d/c at this time. Will return to see patient after next pain medication to ensure she can ambulated household distance for safe discharge to home. Progression toward goals:   []      Improving appropriately and progressing toward goals  [x]      Improving slowly and progressing toward goals  []      Not making progress toward goals and plan of care will be adjusted     PLAN:  Patient continues to benefit from skilled intervention to address the above impairments. Continue treatment per established plan of care. Further Equipment Recommendations for Discharge: rolling walker         AMPAC: Current research shows that an AM-PAC score of 18 (14 without stairs) or greater is associated with a discharge to the patient's home setting. Based on an AM-PAC score of 15/20 if omitting stairs) and their current functional mobility deficits, it is recommended that the patient have 2-3 sessions per week of Physical Therapy at d/c to increase the patient's independence. This AMPAC score should be considered in conjunction with interdisciplinary team recommendations to determine the most appropriate discharge setting. Patient's social support, diagnosis, medical stability, and prior level of function should also be taken into consideration. SUBJECTIVE:   Patient stated, \"I'm really trying. \"    OBJECTIVE DATA SUMMARY:   Critical Behavior:  Orientation Level: Oriented X4  WNL     Functional Mobility Training:  Bed Mobility:  Bed Mobility Training  Bed Mobility Training: Yes  Overall Level of Assistance: Supervision (uses right LE to assist left)  Supine to Sit: Supervision  Sit to Supine: Supervision  Scooting: Supervision  Transfers:  Transfer Training  Transfer Training: Yes  Interventions: Verbal cues  Sit to Stand: Contact-guard assistance  Stand to Sit: Stand-by assistance  Bed to Chair: Contact-guard assistance  Balance:  Balance  Sitting: Intact  Sitting - Static: Good (unsupported)  Sitting - Dynamic: Good (unsupported)  Standing: Impaired  Standing - Static: Good; Other (comment) (with RW for UE support)  Standing - Dynamic: Fair   Ambulation/Gait Training:  Gait  Overall Level of Assistance: Contact-guard assistance  Speed/Ece: Slow  Step Length: Right shortened;Left shortened  Gait Abnormalities: Antalgic;Decreased step clearance;Early heel rise  Distance (ft): 12 Feet  Assistive Device: Walker, rolling     Therapeutic Exercises:     EXERCISE   Sets   Reps   Active Active Assist   Passive Self ROM   Comments   Ankle Pumps 1 10  [x] [] [] []    Quad Sets/Glut Sets 1 10  [x] [] [] [] Hold for 3 secs   Hamstring Sets   [] [] [] []    Short Arc Quads   [] [] [] []    Heel Slides 1 5 [] [x] [] []    Straight Leg Raises 1 5 [] [x] [] []    Hip Add   [] [] [] [] Hold for 5 secs, w/ pillow squeeze   Long Arc Quads 1 5 [] [x] [] [] Through partial range   Seated Marching   [] [] [] []    Standing Marching   [] [] [] []    Seated knee flexion 1 5 [x] [] [] []        Pain:  Pain level pre-treatment: 6/10  Pain level post-treatment: 7/10   Pain Intervention(s): Rest, Repositioning   Response to intervention: Nurse notified    Activity Tolerance:   Activity Tolerance: Patient limited by pain  Please refer to the flowsheet for vital signs taken during this treatment.   After treatment:   [] Patient left in no apparent distress sitting up in chair  [x] Patient left in no apparent distress in bed  [x] Call bell left within reach  [x] Nursing notified  [x] Caregiver present  [] Bed alarm activated  [] SCDs applied      COMMUNICATION/EDUCATION:          Tevin Reyes, PT  Minutes: [place] 2365 (6-Clicks) Version 2    How much HELP from another person does the patient currently need    (If the patient hasn't done an activity recently, how much help from another person do you think he/she would need if he/she tried?)   Total (Total A or Dep)   A Lot  (Mod to Max A)   A Little (Sup or Min A)   None (Mod I to I)   Turning from your back to your side while in a flat bed without using bedrails? [] 1 [] 2 [x] 3 [] 4   2. Moving from lying on your back to sitting on the side of a flat bed without using bedrails? [] 1 [] 2 [x] 3 [] 4   3. Moving to and from a bed to a chair (including a wheelchair)? [] 1 [] 2 [x] 3 [] 4   4. Standing up from a chair using your arms (e.g., wheelchair, or bedside chair)? [] 1 [] 2 [x] 3 [] 4   5. Walking in hospital room? [] 1 [] 2 [x] 3 [] 4   6. Climbing 3-5 steps with a railing?+   [] 1 [] 2 [] 3 [] 4   +If stair climbing cannot be assessed, skip item #6. Sum responses from items 1-5. Current research shows that an AM-PAC score of 18 (14 without stairs) or greater is associated with a discharge to the patient's home setting. Based on an AM-PAC score of 15/20 if omitting stairs) and their current functional mobility deficits, it is recommended that the patient have 2-3 sessions per week of Physical Therapy at d/c to increase the patient's independence.

## 2023-02-22 NOTE — PROGRESS NOTES
Bedside and Verbal shift change report given to Ana Wall (oncoming nurse) by Carmen Shields RN  (offgoing nurse). Report included the following information Nurse Handoff Report, ED SBAR, Adult Overview, Surgery Report, Intake/Output, and Recent Results.

## 2023-02-22 NOTE — NURSE NAVIGATOR
Rounded on patient s/p left total knee replacement with Dr. Edelmira Jeff, [facility] 02/21/2023. Patient observed to be alert and oriented x 3, sitting up in bedside chair. She denies chest pain, shortness of breath,nausea, vomiting or calf pain. She Is rating her pain 7/10 at this time. Mar indicates that she has not been medicated for pain since 330 am. Order for Ultram placed by Dr. Edelmira Jeff yesterday never released, so patient has not had full effect of pain medication regimen. LPN Notified. Ace wrap remains in place to left knee. Dressing underneath clean, dry and intact. Patient has full feeling in left lower extremity. She still has left quadricept weakness and is unable to perform straight leg raise. Reminded patient of postoperative showering procedure, no tubs or submersion in water allowed. Encouraged patient to ambulate hourly as being sedentary can contribute to complications such as DVT, increased pain and stiffness. Encouraged icing hourly for 20 minutes not to be placed directly on her skin. Patient verbalized understanding. Anticipate discharge home today if cleared safe for discharge by PT/ OT with home health and home physical therapy. Patient has support system and all required DME. Will follow until discharge and post operatively.

## 2023-02-22 NOTE — PROGRESS NOTES
Ortho    Pt. Seen and evaluated. Doing well, progressing slow with PT  Denies cp, sob, abd pain    Blood pressure 104/68, pulse 87, temperature 97.5 °F (36.4 °C), temperature source Oral, resp. rate 18, height 5' 6\" (1.676 m), weight 208 lb 6.4 oz (94.5 kg), last menstrual period 01/09/2021, SpO2 97 %.    leftKnee woundclean, dry, no drainage  Sensory intact to LT  Motor intact  nv intact  Neg calf tenderness    Labs:  CBC  Lab Results   Component Value Date    WBC 11.3 02/06/2023    HGB 12.7 02/06/2023    HCT 39.6 02/06/2023    MCV 90.6 02/06/2023     02/06/2023       Coagulation  Lab Results   Component Value Date    INR 1.0 02/06/2023    APTT 29.4 02/06/2023      Basic Metabolic Profile  Lab Results   Component Value Date     02/06/2023    CO2 28 02/06/2023    BUN 13 02/06/2023    GLUCOSE 113 (H) 02/06/2023    CALCIUM 10.0 02/06/2023       Assesment: leftOrthopedic / Rheumatologic: Total Knee Replacement  Past Medical History:   Diagnosis Date    High cholesterol     Ill-defined condition     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

## 2023-02-22 NOTE — DISCHARGE INSTRUCTIONS
DISCHARGE SUMMARY from Nurse    PATIENT INSTRUCTIONS:    After general anesthesia or intravenous sedation, for 24 hours or while taking prescription Narcotics:  Limit your activities  Do not drive and operate hazardous machinery  Do not make important personal or business decisions  Do  not drink alcoholic beverages  If you have not urinated within 8 hours after discharge, please contact your surgeon on call. Report the following to your surgeon:  Excessive pain, swelling, redness or odor of or around the surgical area  Temperature over 100.5  Nausea and vomiting lasting longer than 4 hours or if unable to take medications  Any signs of decreased circulation or nerve impairment to extremity: change in color, persistent  numbness, tingling, coldness or increase pain  Any questions    What to do at Home:  Recommended activity: activity as tolerated,   If you experience any of the following symptoms chest pain, fever greater than 100.5, pain unrelieved by medication, please follow up with Dr. Janette Nye. *  Please give a list of your current medications to your Primary Care Provider. *  Please update this list whenever your medications are discontinued, doses are      changed, or new medications (including over-the-counter products) are added. *  Please carry medication information at all times in case of emergency situations. These are general instructions for a healthy lifestyle:    No smoking/ No tobacco products/ Avoid exposure to second hand smoke  Surgeon General's Warning:  Quitting smoking now greatly reduces serious risk to your health.     Obesity, smoking, and sedentary lifestyle greatly increases your risk for illness    A healthy diet, regular physical exercise & weight monitoring are important for maintaining a healthy lifestyle    You may be retaining fluid if you have a history of heart failure or if you experience any of the following symptoms:  Weight gain of 3 pounds or more overnight or 5 pounds in a week, increased swelling in our hands or feet or shortness of breath while lying flat in bed. Please call your doctor as soon as you notice any of these symptoms; do not wait until your next office visit. Patient armband removed and shredded   Thank you for enrolling in [address] Please follow the instructions below to securely access your online medical record. [employer] allows you to send messages to your doctor, view your test results, renew your prescriptions, schedule appointments, and more. How Do I Sign Up? In your Internet browser, go to [email or web address]  Click on the Sign Up Now link in the Sign In box. You will see the New Member Sign Up page. Enter your [employer] Access Code exactly as it appears below. You will not need to use this code after youve completed the sign-up process. If you do not sign up before the expiration date, you must request a new code. [employer] Access Code: Activation code not generated  Current [employer] Status: Active    Enter your Social Security Number ([id number]) and Date of Birth (mm/dd/yyyy) as indicated and click Submit. You will be taken to the next sign-up page. Create a [employer] ID. This will be your [employer] login ID and cannot be changed, so think of one that is secure and easy to remember. Create a [employer] password. You can change your password at any time. Enter your Password Reset Question and Answer. This can be used at a later time if you forget your password. Enter your e-mail address. You will receive e-mail notification when new information is available in [address] Click Sign Up. You can now view your medical record. Additional Information  If you have questions, please contact your physician practice where you receive care. Remember, [employer] is NOT to be used for urgent needs. For medical emergencies, dial 911. The discharge information has been reviewed with the {PATIENT PARENT GUARDIAN:52834}. The {PATIENT PARENT GUARDIAN:62654} verbalized understanding. Discharge medications reviewed with the {Dishcarge meds reviewed JRXO:34684} and appropriate educational materials and side effects teaching were provided.   ___________________________________________________________________________________________________________________________________

## 2023-02-22 NOTE — DISCHARGE SUMMARY
2/21/2023  5:38 AM    2/22/2023, 12:08 PM    Primary Dx:left Orthopedic / Rheumatologic: Total Knee Replacement  Secondary Dx: Etiological Diagnoses: none    HPI:  Pt has end stage OA of their left knee and had failed conservative treatment. Due to the current findings and affected activity of daily living surgical intervention is indicated.   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    Thyroid disease          Current Facility-Administered Medications:     traMADol (ULTRAM) tablet 50 mg, 50 mg, Oral, Q6H PRN, Angle Alanis MD, 50 mg at 02/22/23 0841    levothyroxine (SYNTHROID) tablet 25 mcg, 25 mcg, Oral, QAM AC, Angle Alanis MD, 25 mcg at 02/22/23 0617    atorvastatin (LIPITOR) tablet 40 mg, 40 mg, Oral, Daily, Angle Alanis MD, 40 mg at 02/22/23 7766    clonazePAM (KLONOPIN) tablet 1 mg, 1 mg, Oral, Q12H PRN, Angle Alanis MD    lamoTRIgine (LAMICTAL) tablet 200 mg, 200 mg, Oral, Daily, Angle Alanis MD, 200 mg at 02/21/23 2005    topiramate (TOPAMAX) tablet 50 mg, 50 mg, Oral, Daily, Angle Alanis MD, 50 mg at 02/21/23 2015    gabapentin (NEURONTIN) capsule 600 mg, 600 mg, Oral, Daily, Angle Alanis MD, 600 mg at 02/22/23 0841    dextrose 5 % in lactated ringers infusion, , IntraVENous, Continuous, Angle Alanis MD, Last Rate: 75 mL/hr at 02/22/23 0319, New Bag at 02/22/23 0319    sodium chloride flush 0.9 % injection 5-40 mL, 5-40 mL, IntraVENous, 2 times per day, Angle Alanis MD, 10 mL at 02/21/23 2118    sodium chloride flush 0.9 % injection 5-40 mL, 5-40 mL, IntraVENous, PRN, Angle Alanis MD    0.9 % sodium chloride infusion, , IntraVENous, PRN, Angle Alanis MD    acetaminophen (TYLENOL) tablet 1,000 mg, 1,000 mg, Oral, 3 times per day, Pérez Chol MD Ree, 1,000 mg at 02/22/23 0119    ketorolac (TORADOL) injection 15 mg, 15 mg, IntraVENous, Q6H, Victoria Sidhu MD, 15 mg at 02/22/23 0615    ondansetron [facility]) injection 4 mg, 4 mg, IntraVENous, Q4H PRN, Victoria Sidhu MD    polyethylene glycol (GLYCOLAX) packet 17 g, 17 g, Oral, Daily, Victoria Sidhu MD    bisacodyl (DULCOLAX) EC tablet 5 mg, 5 mg, Oral, Daily, Victoria Sidhu MD, 5 mg at 02/22/23 3573    hydrOXYzine HCl (ATARAX) tablet 10 mg, 10 mg, Oral, Q8H PRN, Victoria Sidhu MD    oxyCODONE (ROXICODONE) immediate release tablet 5 mg, 5 mg, Oral, Q4H PRN, Victoria Sidhu MD, 5 mg at 02/22/23 8258    aspirin EC tablet 81 mg, 81 mg, Oral, BID, Victoria Sidhu MD, 81 mg at 02/22/23 6738    lactated ringers IV soln infusion, , IntraVENous, Continuous, HIRAM Lloyd - CRNA    Patient has no known allergies. Physical Exam:  General A&O x3 NAD, well developed, well nourished, normal affect  Heart: S1-S2, RRR  Lungs: CTA Bilat  Abd: soft NT, ND  Ext: n/v intact    Hospital Course:    Pt. Had leftOrthopedic / Rheumatologic: Total Knee Replacement    Post -op Course: The patient tolerated the procedure well. Pt. Was place on Abx pre and post-op for prophylaxis against infection as well as aspirin post-op for prophylaxis against DVT. Vitals signs remained stable, remained af. The wound wasclean, dry, no drainage. Pain was well controlled. Pt. Had negative calf tenderness or swelling, no evidence for DVT. Patient had PT/OT consult for evaluation and treatment.     CBC  Lab Results   Component Value Date/Time    WBC 11.3 02/06/2023 03:36 PM    RBC 4.37 02/06/2023 03:36 PM    HCT 39.6 02/06/2023 03:36 PM    MCV 90.6 02/06/2023 03:36 PM    MCH 29.1 02/06/2023 03:36 PM    MCHC 32.1 02/06/2023 03:36 PM    RDW 14.0 02/06/2023 03:36 PM    MPV 9.8 02/06/2023 03:36 PM     Coagulation  Lab Results   Component Value Date    INR 1.0 02/06/2023    APTT 29.4 02/06/2023      Basic Metabolic Profile  Lab Results   Component Value Date     02/06/2023    CO2 28 02/06/2023    BUN 13 02/06/2023    GLUCOSE 113 (H) 02/06/2023    CALCIUM 10.0 02/06/2023       Discharge Meds:  Current Discharge Medication List        START taking these medications    Details   aspirin EC 81 MG EC tablet Take 1 tablet by mouth 2 times daily  Qty: 90 tablet, Refills: 1      oxyCODONE (ROXICODONE) 5 MG immediate release tablet Take 1 tablet by mouth nightly as needed for Pain for up to 7 days. Intended supply: 7 days. Take lowest dose possible to manage pain Max Daily Amount: 5 mg  Qty: 28 tablet, Refills: 0    Comments: Reduce doses taken as pain becomes manageable  Associated Diagnoses: S/P total knee arthroplasty, left      traMADol (ULTRAM) 50 MG tablet Take 1 tablet by mouth every 6 hours as needed for Pain for up to 5 days. Intended supply: 5 days. Take lowest dose possible to manage pain Max Daily Amount: 200 mg  Qty: 30 tablet, Refills: 0    Comments: Reduce doses taken as pain becomes manageable  Associated Diagnoses: S/P total knee arthroplasty, left      pantoprazole (PROTONIX) 40 MG tablet Take 1 tablet by mouth daily  Qty: 60 tablet, Refills: 5      acetaminophen (TYLENOL) 500 MG tablet Take 2 tablets by mouth in the morning, at noon, and at bedtime  Qty: 120 tablet, Refills: 1      docusate sodium (COLACE) 100 MG capsule Take 1 capsule by mouth 2 times daily  Qty: 60 capsule, Refills: 0           CONTINUE these medications which have NOT CHANGED    Details   atorvastatin (LIPITOR) 40 MG tablet TAKE 1 TABLET BY MOUTH EVERY DAY      clonazePAM (KLONOPIN) 1 MG tablet Take 1 mg by mouth.  As needed      lamoTRIgine (LAMICTAL) 200 MG tablet lamotrigine 200 mg tablet   TAKE 1 TABLET BY MOUTH EVERY DAY      topiramate (TOPAMAX) 25 MG tablet TAKE 3 TABLETS BY MOUTH AT BEDTIME      celecoxib (CELEBREX) 200 MG capsule Take 200 mg by mouth daily      cyclobenzaprine (FLEXERIL) 10 MG tablet Take 10 mg by mouth 3 times daily as needed      ergocalciferol (ERGOCALCIFEROL) 1.25 MG (82556 UT) capsule Take 50,000 Units by mouth every 7 days      gabapentin (NEURONTIN) 300 MG capsule Take 300 mg by mouth 3 times daily. levothyroxine (SYNTHROID) 25 MCG tablet Take 25 mcg by mouth every morning (before breakfast) Saturday and Sunday 50mcg           STOP taking these medications       gabapentin (NEURONTIN) 600 MG tablet Comments:   Reason for Stopping:         diclofenac sodium (VOLTAREN) 1 % GEL Comments:   Reason for Stopping:               Discharge Plan:  The patient will be d/c'd to home, total knee protocol, WBAT. They will have [facility] PT and nursing. Total joint protocol. Pt safe for homebound transfer, after being cleared by PT, sp Total joint replacement. A walker, bedside commode, and shower chair will be utilized for ADL's. Follow up in 14 days. Call with any questions or concerns.

## 2023-02-22 NOTE — PROGRESS NOTES
Discharge/ Transition Planning        Uploaded to [facility] and sent home health orders, face sheet, clinicals to [facility].  Ree knee protocol uploaded also      Kassie Alonso RN BSN  /Discharge Planner

## 2023-02-22 NOTE — PLAN OF CARE
Problem: Pain  Goal: Verbalizes/displays adequate comfort level or baseline comfort level  Outcome: Progressing     Problem: Safety - Adult  Goal: Free from fall injury  Outcome: Progressing     Problem: Discharge Planning  Goal: Discharge to home or other facility with appropriate resources  Outcome: Progressing     Problem: Physical Therapy - Adult  Goal: By Discharge: Performs mobility at highest level of function for planned discharge setting. See evaluation for individualized goals. Description: Physical Therapy Goals  Initiated 2/21/2023 and to be accomplished within 7 day(s)  1. Patient will move from supine to sit and sit to supine in bed with modified independence. 2.  Patient will transfer from bed to chair and chair to bed with modified independence using the least restrictive device. 3.  Patient will perform sit to stand with modified independence. 4.  Patient will ambulate with modified independence for 150 feet with the least restrictive device. PLOF: Lives with adult daughter; mother to stay with patient at discharge. One story home with no steps to enter.  Has cane and ww.   2/21/2023 1520 by Arash Mathew PT  Outcome: Progressing     Problem: Respiratory - Adult  Goal: Achieves optimal ventilation and oxygenation  Outcome: Progressing     Problem: Skin/Tissue Integrity - Adult  Goal: Skin integrity remains intact  Outcome: Progressing  Goal: Incisions, wounds, or drain sites healing without S/S of infection  Outcome: Progressing     Problem: Musculoskeletal - Adult  Goal: Return mobility to safest level of function  Outcome: Progressing  Goal: Maintain proper alignment of affected body part  Outcome: Progressing  Goal: Return ADL status to a safe level of function  Outcome: Progressing     Problem: Gastrointestinal - Adult  Goal: Maintains adequate nutritional intake  Outcome: Progressing     Problem: Genitourinary - Adult  Goal: Absence of urinary retention  Outcome: Progressing  Goal: Urinary catheter remains patent  Outcome: Progressing     Problem: Infection - Adult  Goal: Absence of infection at discharge  Outcome: Progressing  Goal: Absence of infection during hospitalization  Outcome: Progressing  Goal: Absence of fever/infection during anticipated neutropenic period  Outcome: Progressing     Problem: Metabolic/Fluid and Electrolytes - Adult  Goal: Electrolytes maintained within normal limits  Outcome: Progressing  Goal: Hemodynamic stability and optimal renal function maintained  Outcome: Progressing     Problem: Hematologic - Adult  Goal: Maintains hematologic stability  Outcome: Progressing

## 2023-02-22 NOTE — PLAN OF CARE
Problem: Physical Therapy - Adult  Goal: By Discharge: Performs mobility at highest level of function for planned discharge setting. See evaluation for individualized goals. Description: Physical Therapy Goals  Initiated 2/21/2023 and to be accomplished within 7 day(s)  1. Patient will move from supine to sit and sit to supine in bed with modified independence. 2.  Patient will transfer from bed to chair and chair to bed with modified independence using the least restrictive device. 3.  Patient will perform sit to stand with modified independence. 4.  Patient will ambulate with modified independence for 150 feet with the least restrictive device. PLOF: Lives with adult daughter; mother to stay with patient at discharge. One story home with no steps to enter. Has cane and ww.   2/22/2023 1307 by Nga Pickett, PT  Outcome: Progressing  2/22/2023 1143 by Nga Pickett, PT  Outcome: Progressing          PHYSICAL THERAPY TREATMENT    Patient: Shermon Meigs (38 y.o. female)  Date: 2/22/2023  Diagnosis: Primary osteoarthritis of left knee [M17.12]  Osteoarthritis of left knee, unspecified osteoarthritis type [M17.12]  S/P total knee arthroplasty, left [Z96.652] Osteoarthritis of left knee, unspecified osteoarthritis type  Procedure(s) (LRB):  LEFT TOTAL KNEE ARTHROPLASTY; [facility]; 2SA'S; NERVE BLOCK; 23HR (Left) 1 Day Post-Op  Precautions: Fall Risk,  , Left Lower Extremity Weight Bearing: Weight Bearing As Tolerated,  ,  ,  ,  ,    PLOF: see above     ASSESSMENT:  Pt in bed with the foot of the bed elevated on arrival.  Re-educated patient on proper positioning and not having knee in a flexed position in bed and not putting anything under the knee, she verbalized understanding. Pt performed bed mobility independently and performed standing transfers with RW and supervision. Pt ambulated 48 feet with RW and CGA with decreased foot clearance and antalgic gait pattern on the left.   Pt was educated on activity pacing and performing exercises at home. Pt is cleared for d/c home with family assistance. Progression toward goals:   []      Improving appropriately and progressing toward goals  [x]      Improving slowly and progressing toward goals  []      Not making progress toward goals and plan of care will be adjusted     PLAN:  Patient continues to benefit from skilled intervention to address the above impairments. Continue treatment per established plan of care. Further Equipment Recommendations for Discharge: rolling walker         AMPAC: Current research shows that an AM-PAC score of 18 (14 without stairs) or greater is associated with a discharge to the patient's home setting. Based on an AM-PAC score of 15/20 omitting stairs and their current functional mobility deficits, it is recommended that the patient have 2-3 sessions per week of Physical Therapy at d/c to increase the patient's independence. This AMPAC score should be considered in conjunction with interdisciplinary team recommendations to determine the most appropriate discharge setting. Patient's social support, diagnosis, medical stability, and prior level of function should also be taken into consideration. SUBJECTIVE:   Patient stated, \"I will do what I'm supposed to when I go joana. \"    OBJECTIVE DATA SUMMARY:   Critical Behavior:  Orientation Level: Oriented X4  WNL     Functional Mobility Training:  Bed Mobility:  Rolling: Independent  Supine to Sit: Independent  Sit to Supine: Independent  Scooting: Independent  Transfers:  Transfer Training  Transfer Training: Yes  Interventions: Verbal cues  Sit to Stand: supervision  Stand to Sit: Supervision  Bed to Chair: Contact-guard assistance  Toilet Transfer: Supervision  Balance:  Balance  Sitting: Intact  Sitting - Static: Good (unsupported)  Sitting - Dynamic: Good (unsupported)  Standing: Impaired  Standing - Static: Good; Other (comment) (with RW for UE support)  Standing - Dynamic: Fair   Ambulation/Gait Training:     Gait  Overall Level of Assistance: Contact-guard assistance  Speed/Cee: Slow  Step Length: Right shortened  Gait Abnormalities: Antalgic;Decreased step clearance;Early heel rise  Distance (ft): 48 Feet  Assistive Device: Walker, rolling  Therapeutic Exercises:     EXERCISE   Sets   Reps   Active Active Assist   Passive Self ROM   Comments   Ankle Pumps 1 10  [x] [] [] []    Quad Sets/Glut Sets    [] [] [] [] Hold for 5 secs   Hamstring Sets   [] [] [] []    Short Arc Quads   [] [] [] []    Heel Slides   [] [] [] []    Straight Leg Raises   [] [] [] []    Hip Add   [] [] [] [] Hold for 5 secs, w/ pillow squeeze   Long Arc Quads   [] [] [] []    Seated Marching   [] [] [] []    Standing Marching   [] [] [] []       [] [] [] []        Pain:  Pain level pre-treatment: 5/10  Pain level post-treatment: 7/10   Pain Intervention(s): Rest, Repositioning   Response to intervention: Nurse notified    Activity Tolerance:   Activity Tolerance: Patient limited by pain  Please refer to the flowsheet for vital signs taken during this treatment. After treatment:   [] Patient left in no apparent distress sitting up in chair  [x] Patient left in no apparent distress in bed  [x] Call bell left within reach  [x] Nursing notified  [x] Caregiver present  [] Bed alarm activated  [] SCDs applied      COMMUNICATION/EDUCATION:          Jerome Castle, PT  Minutes: [address] [place]  AM-PAC® Basic Mobility Inpatient Short Form (6-Clicks) Version 2    How much HELP from another person does the patient currently need    (If the patient hasn't done an activity recently, how much help from another person do you think he/she would need if he/she tried?)   Total (Total A or Dep)   A Lot  (Mod to Max A)   A Little (Sup or Min A)   None (Mod I to I)   Turning from your back to your side while in a flat bed without using bedrails? [] 1 [] 2 [] 3 [x] 4   2.  Moving from lying on your back to sitting on the side of a flat bed without using bedrails? [] 1 [] 2 [] 3 [x] 4   3. Moving to and from a bed to a chair (including a wheelchair)? [] 1 [] 2 [x] 3 [] 4   4. Standing up from a chair using your arms (e.g., wheelchair, or bedside chair)? [] 1 [] 2 [x] 3 [] 4   5. Walking in hospital room? [] 1 [] 2 [x] 3 [] 4   6. Climbing 3-5 steps with a railing?+   [] 1 [] 2 [] 3 [] 4   +If stair climbing cannot be assessed, skip item #6. Sum responses from items 1-5. Current research shows that an AM-PAC score of 18 (14 without stairs) or greater is associated with a discharge to the patient's home setting. Based on an AM-PAC score of 15/20 omitting stairs and their current functional mobility deficits, it is recommended that the patient have 2-3 sessions per week of Physical Therapy at d/c to increase the patient's independence.

## 2023-02-22 NOTE — PROGRESS NOTES
OCCUPATIONAL THERAPY EVALUATION/DISCHARGE    Patient: Rickey Smith (65 y.o. female)  Date: 2/22/2023  Primary Diagnosis: Primary osteoarthritis of left knee [M17.12]  Osteoarthritis of left knee, unspecified osteoarthritis type [M17.12]  S/P total knee arthroplasty, left [Z96.652]  Procedure(s) (LRB):  LEFT TOTAL KNEE ARTHROPLASTY; MED[facility]A; 2SA'S; NERVE BLOCK; 23HR (Left) 1 Day Post-Op   Precautions:Weight Bearing, Fall Risk,  , Left Lower Extremity Weight Bearing: Weight Bearing As Tolerated  PLOF: Pt lives with daughter, reports her mother will be staying with her. Pt reports being independent with ADLs and functional mobility    ASSESSMENT AND RECOMMENDATIONS:    Based on the objective data below, pt demonstrates ability to perform UB ADLs with Mod Ind. Pt demos difficulty with LB ADLs due to pain limiting reaching fwd. Pt educated on and issued reacher, long handled sponge, and sock aid. Following education pt was able to demo LB ADLs with set-up. Pt benefits from Supervision for standing aspects of ADLs and functional transfers due to slightly decreased balance and post surgical pain with WB through LLE. Pt tolerated standing at the sink for ADLs for > 10 min with no LOB. Pt has all necessary DME at home and supportive family to assist and provide Supervision for pt as needed. Pt denies further concerns to OT at this time. Maximum therapeutic gains met at current level of care and patient will be discharged from occupational therapy at this time. Further Equipment Recommendations for Discharge: NA    Discharge Recommendations: Home with Home health OT    AMPAC: Current research shows that an AM-PAC score of 18 or greater is associated with a discharge to the patient's home setting. Based on an AM-PAC score of 21/24 and their current ADL deficits; it is recommended that the patient have 2-3 sessions per week of Occupational Therapy at d/c to increase the patient's independence.       This AMPAC score should be considered in conjunction with interdisciplinary team recommendations to determine the most appropriate discharge setting. Patient's social support, diagnosis, medical stability, and prior level of function should also be taken into consideration. SUBJECTIVE:   Patient stated I have zoey tolerance for pain medication. 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    Thyroid disease      Past Surgical History:   Procedure Laterality Date    APPENDECTOMY       SECTION      x2    HEENT Bilateral     ears x4    HYSTERECTOMY (CERVIX STATUS UNKNOWN)      KNEE SURGERY Left     x2    OTHER SURGICAL HISTORY Left     small mass removed from L groin    OVARIAN CYST REMOVAL Bilateral     TOTAL KNEE ARTHROPLASTY Left 2023    LEFT TOTAL KNEE ARTHROPLASTY; [facility]; 2SA'S; NERVE BLOCK; 23HR performed by Chino Day MD at [id number] [place] [address] Situation:   Social/Functional History  Lives With: Daughter, Parent  Type of Home: House  Home Layout: One level  Home Access: Level entry  Bathroom Shower/Tub: Tub/Shower unit  Bathroom Equipment: Grab bars in shower  Home Equipment: Tonye Drayden, rolling, Cane  ADL Assistance: Independent  Homemaking Assistance: Independent  Transfer Assistance: Independent  []  Right hand dominant   []  Left hand dominant    Cognitive/Behavioral Status:  Orientation Level: Oriented X4  WNL     Skin: visible skin intact  Edema: none noted    Vision/Perceptual:    Vision: Within Functional Limits       Coordination: BUE  Coordination: Within functional limits   Balance:     Balance  Sitting: Intact  Standing: Impaired  Standing - Static: Good  Standing - Dynamic: Fair    Strength: BUE  Strength: Generally decreased, functional    Tone & Sensation: BUE  Tone: Normal  Sensation: Intact    Range of Motion: BUE  AROM: Within functional limits       Functional Mobility and Transfers for ADLs:  Bed Mobility:     Bed Mobility Training  Bed Mobility Training: Yes  Supine to Sit: Supervision  Scooting: Supervision  Transfers:   Transfer Training  Transfer Training: Yes  Interventions: Verbal cues  Sit to Stand: Supervision  Stand to Sit: Supervision  Stand Pivot Transfers: Supervision  Toilet Transfer: Supervision    ADL Assessment:   Feeding: Independent  Grooming: Independent  UE Bathing: Modified independent   LE Bathing: Supervision  UE Dressing: Modified independent   LE Dressing: Supervision; Adaptive equipment  Toileting: Modified independent     ADL Intervention:  BR mobility, toileting with FWW  UB/LB bathing, dressing  AE training  Therapeutic Exercise/Neuromuscular Re-education:    Pain:  Pain level pre-treatment: 8/10   Pain level post-treatment: 8/10       Activity Tolerance:   Activity Tolerance: Patient Tolerated treatment well, Patient limited by pain  Please refer to the flowsheet for vital signs taken during this treatment.     After treatment:   [x] Patient left in no apparent distress sitting up in chair  [] Patient left in no apparent distress in bed  [x] Call bell left within reach  [x] Nursing notified  [] Caregiver present  [] Bed alarm activated    COMMUNICATION/EDUCATION:   Patient Education  Education Given To: Patient  Education Provided: Role of Therapy;Plan of Care;Equipment;ADL Adaptive Strategies  Education Method: Verbal;Teach Back;Demonstration  Barriers to Learning: None  Education Outcome: Verbalized understanding    Thank you for this referral.  Meredith Ramos OTR/L  Minutes: 39    Eval Complexity:  [address] [facility]® Daily Activity Inpatient Short Form (6-Clicks)*    How much HELP from another person does the patient currently need    (If the patient hasn't done an activity recently, how much help from another person do you think he/she would need if he/she tried?)   Total (Total A or Dep)   A Lot  (Mod to Max A)   A Little (Sup or Min A) None (Mod I to I)   Putting on and taking off regular lower body clothing? [] 1 [] 2 [x] 3 [] 4   2. Bathing (including washing, rinsing,      drying)? [] 1 [] 2 [x] 3 [] 4   3. Toileting, which includes using toilet, bedpan or urinal?   [] 1 [] 2 [] 3 [x] 4   4. Putting on and taking off regular upper body clothing? [] 1 [] 2 [] 3 [x] 4   5. Taking care of personal grooming such as brushing teeth? [] 1 [] 2 [] 3 [x] 4   6. Eating meals? [] 1 [] 2 [] 3 [x] 4       Current research shows that an AM-PAC score of 18 or greater is associated with a discharge to the patient's home setting. Based on an AM-PAC score of 21/24 and their current ADL deficits; it is recommended that the patient have 2-3 sessions per week of Occupational Therapy at d/c to increase the patient's independence.

## 2023-02-22 NOTE — DISCHARGE SUMMARY
DISCHARGE SUMMARY    ADMISSION DIAGNOSIS: Primary osteoarthritis of left knee [M17.12]  Osteoarthritis of left knee, unspecified osteoarthritis type [M17.12]  S/P total knee arthroplasty, left [Z96.652]    DISCHARGE DIAGNOSIS: Status post TKR    Subjective: 38 y.o., female, 1 Day Post-Op, Procedure(s):  LEFT TOTAL KNEE ARTHROPLASTY; [facility]; 2SA'S; NERVE BLOCK; 23HR     Admitting date: 2023  5:38 AM     Date of Discharge/Transfer:  23    Condition at Discharge: Stable and cleared to advance to next level of care / rehabilitation. 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 Kehinde Jean Baptiste underwent successful Procedure(s):  LEFT TOTAL KNEE ARTHROPLASTY; [facility]; 2SA'S; NERVE BLOCK; 23HR. She is doing well and ready for discharge. 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    Thyroid disease        PAST SURGICAL HISTORY:   Past Surgical History:   Procedure Laterality Date    APPENDECTOMY       SECTION      x2    HEENT Bilateral     ears x4    HYSTERECTOMY (CERVIX STATUS UNKNOWN)      KNEE SURGERY Left     x2    OTHER SURGICAL HISTORY Left     small mass removed from L groin    OVARIAN CYST REMOVAL Bilateral     TOTAL KNEE ARTHROPLASTY Left 2023    LEFT TOTAL KNEE ARTHROPLASTY; [facility]; 2SA'S; NERVE BLOCK; 23HR performed by Joe Huff MD at [address] [place] [address]: No Known Allergies     CURRENT MEDICATIONS:  A list of medications prior to the time of admission include:  Prior to Admission medications    Medication Sig Start Date End Date Taking?  Authorizing Provider   aspirin EC 81 MG EC tablet Take 1 tablet by mouth 2 times daily 23  Yes Joe Huff MD   oxyCODONE (ROXICODONE) 5 MG immediate release tablet Take 1 tablet by mouth nightly as needed for Pain for up to 7 days. Intended supply: 7 days. Take lowest dose possible to manage pain Max Daily Amount: 5 mg 2/22/23 3/1/23 Yes Jeffrey Farrar MD   traMADol (ULTRAM) 50 MG tablet Take 1 tablet by mouth every 6 hours as needed for Pain for up to 5 days. Intended supply: 5 days. Take lowest dose possible to manage pain Max Daily Amount: 200 mg 2/22/23 2/27/23 Yes Jeffrey Farrar MD   pantoprazole (PROTONIX) 40 MG tablet Take 1 tablet by mouth daily 2/22/23  Yes Jeffrey Farrar MD   acetaminophen (TYLENOL) 500 MG tablet Take 2 tablets by mouth in the morning, at noon, and at bedtime 2/22/23  Yes Jeffrey Farrar MD   docusate sodium (COLACE) 100 MG capsule Take 1 capsule by mouth 2 times daily 2/22/23 3/24/23 Yes Jeffrey Farrar MD   atorvastatin (LIPITOR) 40 MG tablet TAKE 1 TABLET BY MOUTH EVERY DAY 12/17/22   Historical Provider, MD   clonazePAM (KLONOPIN) 1 MG tablet Take 1 mg by mouth. As needed    Historical Provider, MD   lamoTRIgine (LAMICTAL) 200 MG tablet lamotrigine 200 mg tablet   TAKE 1 TABLET BY MOUTH EVERY DAY    Historical Provider, MD   topiramate (TOPAMAX) 25 MG tablet TAKE 3 TABLETS BY MOUTH AT BEDTIME 12/16/22   Historical Provider, MD   celecoxib (CELEBREX) 200 MG capsule Take 200 mg by mouth daily    Ar Automatic Reconciliation   cyclobenzaprine (FLEXERIL) 10 MG tablet Take 10 mg by mouth 3 times daily as needed    Ar Automatic Reconciliation   ergocalciferol (ERGOCALCIFEROL) 1.25 MG (40506 UT) capsule Take 50,000 Units by mouth every 7 days    Ar Automatic Reconciliation   gabapentin (NEURONTIN) 300 MG capsule Take 300 mg by mouth 3 times daily.     Ar Automatic Reconciliation   levothyroxine (SYNTHROID) 25 MCG tablet Take 25 mcg by mouth every morning (before breakfast) Saturday and Sunday 50mcg    Ar Automatic Reconciliation       Hospital Course:   Ms. Albert Fried was admitted to [facility] on the morning of 2/21/2023  5:38 AM. She was taken to the operating room where she underwent a Procedure(s):  LEFT TOTAL KNEE ARTHROPLASTY; [facility]; 2SA'S; NERVE BLOCK; 23HR. She tolerated the procedure well, and there were no intra operative complications. Post operatively she was transferred medically stable to post op holding. After all safety criteria had been met during her immediate post op recovery phase She was transferred medical stable to 2 Surgical to begin comprehensive physical and occupational therapies, restorative nursing and thrombo embolism (DVT/PE) prophylaxis. Ms. Valerie Arnold post operative course progressed slow but directed. The focus throughout the post op period focused on range of motion and pain control. Medically she remained stable through the remainder of the hospital stay. In light of the appropriate gains attained by Ms. Valerie Arnold post operatively she is being recommended for a directed course of comprehensive PT / OT. DISCHARGE PLAN:      The patient will be discharged to home. DIET:  Low Calorie High Protein Diet    NUTRITION: OTC Nutritional supplements, multivitamins      ACTIVITY: No lifting, Driving, or Strenuous exercise and No heavy lifting, pushing, pulling. Weight Bearing/Range of Motion Restrictions: Per Protocol    WOUND / INCISION CARE: Keep wound clean and dry, Reinforce dressing PRN and Ice to area for comfort Keep the current dressings on and in place. There is no need to change these current dressings. Dressings to please be changed by home nurse or nursing home staff each day. Keep all pets away from any wound present in order to prevent infection. PAIN CONTROL: Prescriptions written: On chart    PRECAUTIONS: Weight Bearing/Range of Motion per Restrictions: See Below    VTE PROPHYLAXIS: ASA 81mg (po) BID    ANTIBIOTICS: None at this time.       MEDICATIONS AT DISCHARGE:  Current Discharge Medication List        START taking these medications    Details   aspirin EC 81 MG EC tablet Take 1 tablet by mouth 2 times daily  Qty: 90 tablet, Refills: 1      oxyCODONE (ROXICODONE) 5 MG immediate release tablet Take 1 tablet by mouth nightly as needed for Pain for up to 7 days. Intended supply: 7 days. Take lowest dose possible to manage pain Max Daily Amount: 5 mg  Qty: 28 tablet, Refills: 0    Comments: Reduce doses taken as pain becomes manageable  Associated Diagnoses: S/P total knee arthroplasty, left      traMADol (ULTRAM) 50 MG tablet Take 1 tablet by mouth every 6 hours as needed for Pain for up to 5 days. Intended supply: 5 days. Take lowest dose possible to manage pain Max Daily Amount: 200 mg  Qty: 30 tablet, Refills: 0    Comments: Reduce doses taken as pain becomes manageable  Associated Diagnoses: S/P total knee arthroplasty, left      pantoprazole (PROTONIX) 40 MG tablet Take 1 tablet by mouth daily  Qty: 60 tablet, Refills: 5      acetaminophen (TYLENOL) 500 MG tablet Take 2 tablets by mouth in the morning, at noon, and at bedtime  Qty: 120 tablet, Refills: 1      docusate sodium (COLACE) 100 MG capsule Take 1 capsule by mouth 2 times daily  Qty: 60 capsule, Refills: 0           CONTINUE these medications which have NOT CHANGED    Details   atorvastatin (LIPITOR) 40 MG tablet TAKE 1 TABLET BY MOUTH EVERY DAY      clonazePAM (KLONOPIN) 1 MG tablet Take 1 mg by mouth. As needed      lamoTRIgine (LAMICTAL) 200 MG tablet lamotrigine 200 mg tablet   TAKE 1 TABLET BY MOUTH EVERY DAY      topiramate (TOPAMAX) 25 MG tablet TAKE 3 TABLETS BY MOUTH AT BEDTIME      celecoxib (CELEBREX) 200 MG capsule Take 200 mg by mouth daily      cyclobenzaprine (FLEXERIL) 10 MG tablet Take 10 mg by mouth 3 times daily as needed      ergocalciferol (ERGOCALCIFEROL) 1.25 MG (73442 UT) capsule Take 50,000 Units by mouth every 7 days      gabapentin (NEURONTIN) 300 MG capsule Take 300 mg by mouth 3 times daily.       levothyroxine (SYNTHROID) 25 MCG tablet Take 25 mcg by mouth every morning (before breakfast) Saturday and Sunday 50mcg STOP taking these medications       gabapentin (NEURONTIN) 600 MG tablet Comments:   Reason for Stopping:         diclofenac sodium (VOLTAREN) 1 % GEL Comments:   Reason for Stopping:                 Physical and Occupational therapies:    will continue with attention to standard postop precautions / restrictions and below:    [x] Full WBAT   [] Partial WBAT   [] Toetouch WB   [] Non Weight Bearing: Follow up:    [] 1 week  [x] 10 days  [] Specific Date:       Per [facility] Protocol this  case was discussed with attending surgeon and reflects their orders as confirmed by their co signature.       Yanick Croft [facility]  [address] [place] [address]

## 2023-02-22 NOTE — NURSE NAVIGATOR
Rounded on patient to check pain level. Patient found back in bed. Per patient she put herself back to bed just a few minutes ago. Education provided that she has not cleared physical therapy yet. Per patient pain is a bit better at this time. Encouraged patient to ambulate with physical therapy so that she can discharge home. Patient states that she will do so as she would like to go home Family at bedside.

## 2023-02-22 NOTE — PROGRESS NOTES
Attempted to see patient for PT treatment however she is requesting to have pain medication prior to treatment. Will re-attempt later this morning.   Alpesh Saavedra, PT

## 2023-02-22 NOTE — NURSE NAVIGATOR
Attempted to stand patient as patient stated that pain was better controlled after ultram. Pain still 6/10. Blood pressure 111/69. Patient requesting something more for pain before working with physical therapy, not unreasonable as she is behind on pain regimen at this point. LPN notified she will medicate and PT will evaluate in 45 minutes.

## 2023-02-23 ENCOUNTER — TELEPHONE (OUTPATIENT)
Age: 43
End: 2023-02-23

## 2023-02-23 ENCOUNTER — OFFICE VISIT (OUTPATIENT)
Age: 43
End: 2023-02-23

## 2023-02-23 VITALS — BODY MASS INDEX: 33.43 KG/M2 | WEIGHT: 208 LBS | HEIGHT: 66 IN | TEMPERATURE: 97.8 F

## 2023-02-23 DIAGNOSIS — Z96.652 S/P TOTAL KNEE ARTHROPLASTY, LEFT: Primary | ICD-10-CM

## 2023-02-23 PROCEDURE — 99024 POSTOP FOLLOW-UP VISIT: CPT | Performed by: SPECIALIST

## 2023-02-23 NOTE — TELEPHONE ENCOUNTER
Post op Patient and her Home Health Therapist, both on the phone at the same time called for . Said that the patient pain level is a 10 out of 10 , and the Oxycodone and Tramadol is not working. [facility] on [address] [facility]. [phone]. Patient tel. [phone]. Note : patient has an appt already scheduled for 03/07/23 for the left Tka.

## 2023-02-23 NOTE — PROGRESS NOTES
Patient: Khris Gómez                MRN: [id number]       SSN: [id number]  YOB: 1980        AGE: 38 y.o. SEX: female      PCP: Colby Gudino MD  02/23/23    Chief Complaint   Patient presents with    Knee Pain     left    Post-Op Check     S/p Left tka   Surgery 2/21/23       HISTORY:  Khris Gómez is a 38 y.o. female who is s/p left knee total arthroplasty 2/21/23. She is still experiencing some pain and swelling. Wt Readings from Last 3 Encounters:   02/23/23 208 lb (94.3 kg)   02/21/23 208 lb 6.4 oz (94.5 kg)   02/15/23 210 lb (95.3 kg)      Body mass index is 33.57 kg/m². Patient Active Problem List   Diagnosis    Uterine fibromyoma    Pelvic pain    Menorrhagia    S/P total knee arthroplasty, left       Social History     Tobacco Use    Smoking status: Former     Packs/day: 0.25     Types: Cigarettes    Smokeless tobacco: Never   Vaping Use    Vaping Use: Never used   Substance Use Topics    Alcohol use: Never    Drug use: Never        No Known Allergies     Current Outpatient Medications   Medication Sig    aspirin EC 81 MG EC tablet Take 1 tablet by mouth 2 times daily    oxyCODONE (ROXICODONE) 5 MG immediate release tablet Take 1 tablet by mouth nightly as needed for Pain for up to 7 days. Intended supply: 7 days. Take lowest dose possible to manage pain Max Daily Amount: 5 mg    traMADol (ULTRAM) 50 MG tablet Take 1 tablet by mouth every 6 hours as needed for Pain for up to 5 days. Intended supply: 5 days. Take lowest dose possible to manage pain Max Daily Amount: 200 mg    pantoprazole (PROTONIX) 40 MG tablet Take 1 tablet by mouth daily    acetaminophen (TYLENOL) 500 MG tablet Take 2 tablets by mouth in the morning, at noon, and at bedtime    docusate sodium (COLACE) 100 MG capsule Take 1 capsule by mouth 2 times daily    atorvastatin (LIPITOR) 40 MG tablet TAKE 1 TABLET BY MOUTH EVERY DAY    clonazePAM (KLONOPIN) 1 MG tablet Take 1 mg by mouth.  As needed    lamoTRIgine (LAMICTAL) 200 MG tablet lamotrigine 200 mg tablet   TAKE 1 TABLET BY MOUTH EVERY DAY    topiramate (TOPAMAX) 25 MG tablet TAKE 3 TABLETS BY MOUTH AT BEDTIME    celecoxib (CELEBREX) 200 MG capsule Take 200 mg by mouth daily    cyclobenzaprine (FLEXERIL) 10 MG tablet Take 10 mg by mouth 3 times daily as needed    ergocalciferol (ERGOCALCIFEROL) 1.25 MG (42229 UT) capsule Take 50,000 Units by mouth every 7 days    gabapentin (NEURONTIN) 300 MG capsule Take 300 mg by mouth 3 times daily. levothyroxine (SYNTHROID) 25 MCG tablet Take 25 mcg by mouth every morning (before breakfast) Saturday and Sunday 50mcg     No current facility-administered medications for this visit. PHYSICAL EXAMINATION:  Temp 97.8 °F (36.6 °C) (Temporal)   Ht 5' 6\" (1.676 m)   Wt 208 lb (94.3 kg)   LMP 01/09/2021 (Approximate)   BMI 33.57 kg/m²      ORTHO EXAMINATION:  Surgical wound clean and dry. IMPRESSION:    Encounter Diagnosis   Name Primary? S/P total knee arthroplasty, left Yes        PLAN:   I will see her back in 2 weeks.     Jt Meier MD

## 2023-02-23 NOTE — TELEPHONE ENCOUNTER
Discussed with Dr Lilibeth Randolph will see pt today--spoke with pt--put on schedule at 1:30pm today at [facility] location

## 2023-03-04 DIAGNOSIS — M17.12 UNILATERAL PRIMARY OSTEOARTHRITIS, LEFT KNEE: ICD-10-CM

## 2023-03-04 DIAGNOSIS — M25.562 PAIN IN LEFT KNEE: ICD-10-CM

## 2023-03-04 DIAGNOSIS — Z96.652 S/P TOTAL KNEE ARTHROPLASTY, LEFT: ICD-10-CM

## 2023-03-06 RX ORDER — DOCUSATE SODIUM 100 MG/1
CAPSULE, LIQUID FILLED ORAL
Qty: 60 CAPSULE | Refills: 0 | Status: SHIPPED | OUTPATIENT
Start: 2023-03-06

## 2023-03-06 RX ORDER — TRAMADOL HYDROCHLORIDE 50 MG/1
50 TABLET ORAL EVERY 6 HOURS PRN
Qty: 20 TABLET | Refills: 1 | Status: SHIPPED | OUTPATIENT
Start: 2023-03-06 | End: 2023-03-11

## 2023-03-07 ENCOUNTER — TELEPHONE (OUTPATIENT)
Age: 43
End: 2023-03-07

## 2023-03-07 DIAGNOSIS — M17.12 UNILATERAL PRIMARY OSTEOARTHRITIS, LEFT KNEE: Primary | ICD-10-CM

## 2023-03-07 RX ORDER — OXYCODONE HYDROCHLORIDE 5 MG/1
5 TABLET ORAL EVERY 6 HOURS PRN
Qty: 28 TABLET | Refills: 0 | Status: SHIPPED | OUTPATIENT
Start: 2023-03-07 | End: 2023-03-14

## 2023-03-07 NOTE — TELEPHONE ENCOUNTER
Patient is requesting medication refills for her pain.   Please call her if you have any questions at [phone]

## 2023-03-08 ENCOUNTER — TELEPHONE (OUTPATIENT)
Age: 43
End: 2023-03-08

## 2023-03-08 NOTE — TELEPHONE ENCOUNTER
Dann from [facility] called to request for PT to extend into next week to cover until appt on 3/15.      Please advise: [phone]

## 2023-03-15 ENCOUNTER — OFFICE VISIT (OUTPATIENT)
Age: 43
End: 2023-03-15
Payer: COMMERCIAL

## 2023-03-15 VITALS — BODY MASS INDEX: 32.3 KG/M2 | HEIGHT: 66 IN | TEMPERATURE: 97.8 F | WEIGHT: 201 LBS

## 2023-03-15 DIAGNOSIS — Z96.652 S/P TOTAL KNEE ARTHROPLASTY, LEFT: Primary | ICD-10-CM

## 2023-03-15 PROCEDURE — 99024 POSTOP FOLLOW-UP VISIT: CPT | Performed by: PHYSICIAN ASSISTANT

## 2023-03-15 PROCEDURE — 73562 X-RAY EXAM OF KNEE 3: CPT | Performed by: PHYSICIAN ASSISTANT

## 2023-03-15 NOTE — PROGRESS NOTES
Steff Eden returns to the office just short of 1 month status post her primary left total knee replacement. She is doing fair. She is continuing outpatient physical therapy. She has pain which is managed with tramadol up. She is using a single post cane for ambulation assistance. Over the anterior aspect of the left knee in a craniocaudal orientation reveals a 22 cm surgical incision intact healing nicely wound borders well approximated. Wound is clean and dry. Surgical staples are present. Active range of motion at bedside 85 degrees -10 degrees. Patella tracks midline. No instability on varus or valgus stressing. Procedural: Using clean technique all staples removed today with no complications. Steri-Strips were placed with a sterile top-cover. X-ray: [facility] 3/15/2023 space 2 view of the left knee reveals a total joint prosthesis intact with no evidence of periprosthetic fracture or dislocation. Alignment is anatomical.  Skin staples are noted in both views. Plan: Patient continue advancing to outpatient physical therapy services. She will maintain full weightbearing left lower extremity with single post cane assisted ambulation. Tramadol was refilled for symptom management today. Patient to follow back in 3 weeks.

## 2023-03-21 ENCOUNTER — HOSPITAL ENCOUNTER (OUTPATIENT)
Facility: HOSPITAL | Age: 43
Setting detail: RECURRING SERIES
Discharge: HOME | End: 2023-03-24
Payer: COMMERCIAL

## 2023-03-21 ENCOUNTER — TELEPHONE (OUTPATIENT)
Age: 43
End: 2023-03-21

## 2023-03-21 PROCEDURE — 97162 PT EVAL MOD COMPLEX 30 MIN: CPT

## 2023-03-21 NOTE — PROGRESS NOTES
[facility] - [place] [address]  [address], [phone] fax    Plan of Care / Statement of Necessity for Physical Therapy Services     Patient Name: Syed Sandoval : 1980   Medical   Diagnosis: S/p left Total Knee Replacement Treatment Diagnosis: Pain in left knee [M25.562]   Onset Date: DOS:  2023 Payor:  Payor: [address] / Plan: [address] / Product Type: *No Product type* /    Referral Source: [facility]): 3/21/2023   Prior Hospitalization: See medical history Provider #: [id number]   Prior Level of Function: Independent with ADLs, chores, , pet care, community mobility with chronic bilat knee pain/OA   Comorbidities: FM, Depression, Arthritis, Thyroid Problems, Weight Change > 10 lbs, Spinal Tumor; Hyperlipidemia   Medications: Verified on Patient Summary List     Assessment / key information:  Patient is a 38year old female referred to PT by her orthopedist for s/p left TKR on 23. Pt had home health PT, which ended last week. Staples out 03/15/23--no bleeding or drainage and steri-strips in place. Pt c/o left knee pain and stiffness. Pt has 6year old son at home; 21year old daughter lives nearby and comes by to help. Pt has resumed driving (auto) but difficult/discomfort in and out of car. Pt has ramp up to front door to enter home and then one level. Pt normally does outdoor chores at her home, but her father is doing them for her lately due to knee and back limitations. Pt has a dog and a cat in her home and they can be let out in fenced yard and daughter helping out with care. Pt reports difficulty with lower body dressing due to knee pain/stiffness and LBP. Pt has tub shower with chair, but no grab bars.   Pt c/o trouble falling asleep due to left knee pain--must sleep propped up/reclined for LBP, but then hard to get knee supported and
PHYSICAL / OCCUPATIONAL THERAPY - DAILY TREATMENT NOTE (updated )    Patient Name: Syed Sandoval    Date: 3/21/2023    : 1980  Insurance: Payor: [facility] [address] [employer] / Plan: [facility] [address] / Product Type: *No Product type* /      Patient  verified Yes     Visit #   Current / Total 1 10   Time   In / Out 10:15 11:00   Pain   In / Out ~5/10 ~4/10   Subjective Functional Status/Changes: See Eval/POC. Changes to:  Meds, Allergies, Med Hx, Sx Hx? If yes, update Summary List no       TREATMENT AREA =  Pain in left knee [M25.562]    OBJECTIVE    35 min [x]Eval - untimed                         Modality rationale: decrease edema, decrease inflammation, and decrease pain to improve the patients ability to transfer, stand, walk for ADLs, community mobility with less pain. Min Type Additional Details    [] Estim:  []Unatt       []IFC  []Premod                        []Other:  []w/ice   []w/heat  Position:  Location:    [] Estim: []Att    []TENS instruct  []NMES                    []Other:  []w/US   []w/ice   []w/heat  Position:  Location:    []  Traction: [] Cervical       []Lumbar                       [] Prone          []Supine                       []Intermittent   []Continuous Lbs:  [] before manual  [] after manual    []  Ultrasound: []Continuous   [] Pulsed                           []1MHz   []3MHz Location:  W/cm2:    []  Iontophoresis with dexamethasone         Location: [] Take home patch   [] In clinic   10 [x]  Ice     []  heat  []  Ice massage  []  Laser   []  Anodyne Position:  Supine wit LE supported in slightly flexion.   Location:  left knee post-Tx, while HEP andouts prepared.    []  Laser with stim  []  Other: Position:  Location:    []  Vasopneumatic Device  Pre-treatment girth:  Post-treatment girth:  Measured at (location):  Pressure:       [] lo [] med [] hi   Temperature: [] lo [] med [] hi   [x] Skin assessment post-treatment:  [x]intact []redness- no adverse
stated

## 2023-03-24 ENCOUNTER — HOSPITAL ENCOUNTER (OUTPATIENT)
Facility: HOSPITAL | Age: 43
Setting detail: RECURRING SERIES
Discharge: HOME | End: 2023-03-27
Payer: COMMERCIAL

## 2023-03-24 PROCEDURE — 97112 NEUROMUSCULAR REEDUCATION: CPT

## 2023-03-24 PROCEDURE — 97530 THERAPEUTIC ACTIVITIES: CPT

## 2023-03-24 PROCEDURE — 97110 THERAPEUTIC EXERCISES: CPT

## 2023-03-24 NOTE — PROGRESS NOTES
PHYSICAL / OCCUPATIONAL THERAPY - DAILY TREATMENT NOTE (updated )    Patient Name: Bethanie Dad    Date: 3/24/2023    : 1980  Insurance: Payor: [facility] [address] / Plan: [facility] [address] / Product Type: *No Product type* /      Patient  verified Yes     Visit #   Current / Total 2 10   Time   In / Out 1030 1123   Pain   In / Out 7 6   Subjective Functional Status/Changes: Patient reports the pain has been staying around 7-8, sometimes it comes down to around a 6. She took a Tramadol before her visit today. Changes to:  Meds, Allergies, Med Hx, Sx Hx?   If yes, update Summary List no       TREATMENT AREA =  Pain in left knee [M25.562]    OBJECTIVE    Modality rationale: decrease edema, decrease inflammation, and decrease pain to improve the patients ability to perform ADLs   Min Type Additional Details    [] Estim:  []Unatt       []IFC  []Premod                        []Other:  []w/ice   []w/heat  Position:  Location:    [] Estim: []Att    []TENS instruct  []NMES                    []Other:  []w/US   []w/ice   []w/heat  Position:  Location:    []  Traction: [] Cervical       []Lumbar                       [] Prone          []Supine                       []Intermittent   []Continuous Lbs:  [] before manual  [] after manual    []  Ultrasound: []Continuous   [] Pulsed                           []1MHz   []3MHz Location:  W/cm2:    []  Iontophoresis with dexamethasone         Location: [] Take home patch   [] In clinic   7 [x]  Ice     []  heat  []  Ice massage  []  Laser   []  Anodyne Position: supine with wedge  Location: left knee    []  Laser with stim  []  Other: Position:  Location:    []  Vasopneumatic Device  Pre-treatment girth:  Post-treatment girth:  Measured at (location):  Pressure:       [] lo [] med [] hi   Temperature: [] lo [] med [] hi   [x] Skin assessment post-treatment:  [x]intact []redness- no adverse reaction    []redness - adverse reaction:     Therapeutic

## 2023-03-27 ENCOUNTER — HOSPITAL ENCOUNTER (OUTPATIENT)
Facility: HOSPITAL | Age: 43
Setting detail: RECURRING SERIES
Discharge: HOME | End: 2023-03-30
Payer: COMMERCIAL

## 2023-03-27 PROCEDURE — 97530 THERAPEUTIC ACTIVITIES: CPT

## 2023-03-27 PROCEDURE — 97110 THERAPEUTIC EXERCISES: CPT

## 2023-03-27 PROCEDURE — 97112 NEUROMUSCULAR REEDUCATION: CPT

## 2023-03-27 NOTE — PROGRESS NOTES
AM Joyce Swenson PA-C [facility] AMB   3/31/2023 12:00 PM Ofe Collado, PTA [facility]   4/4/2023 11:00 AM [facility] PT [facility] 1 [facility]   4/6/2023 10:00 AM TIERRA Nguyen   4/11/2023 10:30 AM [facility]   4/13/2023 11:00 AM [facility] PT [facility] 1 [facility]

## 2023-03-29 ENCOUNTER — OFFICE VISIT (OUTPATIENT)
Age: 43
End: 2023-03-29

## 2023-03-29 VITALS — WEIGHT: 200 LBS | TEMPERATURE: 97.1 F | HEIGHT: 66 IN | BODY MASS INDEX: 32.14 KG/M2

## 2023-03-29 DIAGNOSIS — Z96.652 S/P TOTAL KNEE ARTHROPLASTY, LEFT: Primary | ICD-10-CM

## 2023-03-29 PROCEDURE — 99024 POSTOP FOLLOW-UP VISIT: CPT | Performed by: PHYSICIAN ASSISTANT

## 2023-03-29 NOTE — PROGRESS NOTES
Dee Amado returns to the office for follow-up regarding her left primary total knee replacement. She is in outpatient physical therapy and completed 2 sessions of. She is working on her own self-guided home exercises with attention to range of motion and stretching of. Left knee over the midline reveals craniocaudal healing surgical incision wound borders well approximated. No evidence of wound dehiscence or infection. Skin surrounding soft no erythema no indurations no fluctuance no evidence of hematoma or seroma. Active range of motion at bedside lacking 10 degrees to full extension and able to flex back to 85 degrees. Decree sensation associated with the lateral aspect of the left knee in the area of the innervation of the infrapatellar branch of the saphenous nerve which is generally unchanged from just following surgery. No calf tenderness or evidence of DVT left lower extremity. Distal sensation noted left lower extremity. Plan: Patient was demonstrated terminal extension exercises today she will do that at least 10 times daily holding for 10 to 20 seconds. She will follow back with our office in about 3 weeks. Today all of her questions answered to her satisfaction. Taper her opioid medication for pain to Tylenol if necessary.

## 2023-03-30 RX ORDER — DOCUSATE SODIUM 100 MG/1
CAPSULE, LIQUID FILLED ORAL
Qty: 60 CAPSULE | Refills: 0 | OUTPATIENT
Start: 2023-03-30

## 2023-03-31 ENCOUNTER — HOSPITAL ENCOUNTER (OUTPATIENT)
Facility: HOSPITAL | Age: 43
Setting detail: RECURRING SERIES
End: 2023-03-31
Payer: COMMERCIAL

## 2023-03-31 PROCEDURE — 97112 NEUROMUSCULAR REEDUCATION: CPT

## 2023-03-31 PROCEDURE — 97535 SELF CARE MNGMENT TRAINING: CPT

## 2023-03-31 PROCEDURE — 97110 THERAPEUTIC EXERCISES: CPT

## 2023-03-31 PROCEDURE — 97530 THERAPEUTIC ACTIVITIES: CPT

## 2023-03-31 NOTE — PROGRESS NOTES
PHYSICAL / OCCUPATIONAL THERAPY - DAILY TREATMENT NOTE (updated )    Patient Name: Judith Moreno    Date: 3/31/2023    : 1980  Insurance: Payor: [facility] [employer] / Plan: [facility] [address] / Product Type: *No Product type* /      Patient  verified Yes     Visit #   Current / Total 4 10   Time   In / Out 1200 100   Pain   In / Out 4 3   Subjective Functional Status/Changes: Patient reports the pain is getting better and she's been stretching to get her knee straight. Changes to:  Meds, Allergies, Med Hx, Sx Hx? If yes, update Summary List no       TREATMENT AREA =  Pain in left knee [M25.562]    OBJECTIVE    Modality rationale: decrease inflammation and decrease pain to improve the patients ability to perform ADLs   Min Type Additional Details    [] Estim:  []Unatt       []IFC  []Premod                        []Other:  []w/ice   []w/heat  Position:  Location:    [] Estim: []Att    []TENS instruct  []NMES                    []Other:  []w/US   []w/ice   []w/heat  Position:  Location:    []  Traction: [] Cervical       []Lumbar                       [] Prone          []Supine                       []Intermittent   []Continuous Lbs:  [] before manual  [] after manual    []  Ultrasound: []Continuous   [] Pulsed                           []1MHz   []3MHz Location:  W/cm2:    []  Iontophoresis with dexamethasone         Location: [] Take home patch   [] In clinic   10 [x]  Ice     []  heat  []  Ice massage  []  Laser   []  Anodyne Position: supine with wedge  Location: left knee    []  Laser with stim  []  Other: Position:  Location:    []  Vasopneumatic Device  Pre-treatment girth:  Post-treatment girth:  Measured at (location):  Pressure:       [] lo [] med [] hi   Temperature: [] lo [] med [] hi   [x] Skin assessment post-treatment:  [x]intact []redness- no adverse reaction    []redness - adverse reaction:       Therapeutic Procedures:     Tx Min Billable or 1:1 Min (if diff from [facility]) LE MMT scores to >/= 4+/5 throughout. Status at IE Hip ER 3 to 3+/5 with knee pain; Hip IR 3+ to 4-/5 with knee pain; Hip Flex 4/5 with knee pain; knee Flex 3+ to 4-/5 with pain; Knee Ext 3 to 3+/5 with pain; heel raise in SLS </= 2+/5.     PLAN  Yes  Continue plan of care  [x]  Upgrade activities as tolerated  []  Discharge due to :  []  Other:    Dolly Bassett PTA    3/31/2023    11:57 AM    Future Appointments   Date Time Provider Joy Ugalde   3/31/2023 12:00 PM Dolly Bassett PTA [facility]   4/4/2023 11:00 AM [facility] PT [facility] 1 [facility]   4/6/2023 10:00 AM [facility]   4/11/2023 10:30 AM [facility] Bassett [facility]   4/13/2023 11:00 AM [facility] PT [facility] [place] 1 [facility]   4/19/2023 10:00 AM Robles Lombardo PA-C [facility] AMB

## 2023-04-04 ENCOUNTER — HOSPITAL ENCOUNTER (OUTPATIENT)
Facility: HOSPITAL | Age: 43
Setting detail: RECURRING SERIES
Discharge: HOME | End: 2023-04-07
Payer: COMMERCIAL

## 2023-04-04 PROCEDURE — 97112 NEUROMUSCULAR REEDUCATION: CPT

## 2023-04-04 PROCEDURE — 97530 THERAPEUTIC ACTIVITIES: CPT

## 2023-04-04 PROCEDURE — 97110 THERAPEUTIC EXERCISES: CPT

## 2023-04-04 NOTE — PROGRESS NOTES
interventions, analyze and address functional mobility deficits, analyze and address ROM deficits, analyze and address strength deficits, analyze and address soft tissue restrictions, analyze and cue for proper movement patterns, analyze and modify for postural abnormalities, analyze and address imbalance/dizziness, and instruct in home and community integration to address functional deficits and attain remaining goals. Progress toward goals / Updated goals:  []  See Progress Note/Recertification    Short Term Goals: To be accomplished in 4-5 weeks   Patient independent and compliant with HEP. Status at IE has very basic HEP and progressing in outpatient PT. Reports compliance  2. Decrease max pain rating by >/= 50%. Status at IE High pain ratings. Pain 6-7/10 today. 3.  Increase left knee PROM for extension by >/= 8 degrees. Status at IE to -13 degrees. Grossly -8 to -10 deg. 4.  Increase left knee flexion PROM to >/= 115 deg. Status at IE 91 degrees  Grossly > 100 degrees. Long Term Goals: To be accomplished in 5-8 weeks   Improve FOTO to >/= 49  Status at IE 27.  Reassess at 30 days  2. Increase left knee PROM to 0 ==> 125 deg. Status at IE -13 ==> 91 deg. Progressing: about 8-10 to ~100 deg. 3.  Minimal gait deviation and no use of assistive device. Status at IE right SPC, slowed and antalgic left with decreased stance time and pain in weightbearing, decreased left knee flexion and lacks full extension. Continued use of SPC and decreased left stance time/antalgic, lacking full knee ext. 4.  Increase left LE MMT scores to >/= 4+/5 throughout. Status at IE Hip ER 3 to 3+/5 with knee pain; Hip IR 3+ to 4-/5 with knee pain; Hip Flex 4/5 with knee pain; knee Flex 3+ to 4-/5 with pain; Knee Ext 3 to 3+/5 with pain; heel raise in SLS </= 2+/5. Challenged by and discomfort with resisted left knee flex and ext exercises.     PLAN  Yes  Continue plan of care  [x]  Upgrade activities as tolerated  []

## 2023-04-13 ENCOUNTER — HOSPITAL ENCOUNTER (OUTPATIENT)
Facility: HOSPITAL | Age: 43
Setting detail: RECURRING SERIES
Discharge: HOME | End: 2023-04-16
Payer: COMMERCIAL

## 2023-04-13 PROCEDURE — 97112 NEUROMUSCULAR REEDUCATION: CPT

## 2023-04-13 PROCEDURE — 97530 THERAPEUTIC ACTIVITIES: CPT

## 2023-04-13 PROCEDURE — 97110 THERAPEUTIC EXERCISES: CPT

## 2023-04-17 DIAGNOSIS — R52 ACUTE PAIN: ICD-10-CM

## 2023-04-17 DIAGNOSIS — Z96.652 S/P TOTAL KNEE ARTHROPLASTY, LEFT: ICD-10-CM

## 2023-04-18 RX ORDER — TRAMADOL HYDROCHLORIDE 50 MG/1
50 TABLET ORAL EVERY 8 HOURS PRN
Qty: 21 TABLET | Refills: 0 | Status: SHIPPED | OUTPATIENT
Start: 2023-04-18 | End: 2023-04-25

## 2023-04-19 ENCOUNTER — OFFICE VISIT (OUTPATIENT)
Age: 43
End: 2023-04-19

## 2023-04-19 VITALS — BODY MASS INDEX: 31.02 KG/M2 | TEMPERATURE: 98.6 F | HEIGHT: 66 IN | WEIGHT: 193 LBS

## 2023-04-19 DIAGNOSIS — Z96.652 STATUS POST LEFT KNEE REPLACEMENT: Primary | ICD-10-CM

## 2023-04-19 DIAGNOSIS — M25.662 JOINT STIFFNESS OF KNEE, LEFT: ICD-10-CM

## 2023-04-19 DIAGNOSIS — Z48.89 ENCOUNTER FOR POST SURGICAL WOUND CHECK: ICD-10-CM

## 2023-04-19 PROCEDURE — 99024 POSTOP FOLLOW-UP VISIT: CPT | Performed by: PHYSICIAN ASSISTANT

## 2023-04-19 NOTE — PROGRESS NOTES
Gokul Last returns to the office 2 months status post her primary left total knee replacement. Doing  well today. She is continuing physical therapy. She reports stiffness in the joint which is intermittent. She is slightly frustrated that her motion has not improved beyond her current presentation. She is using ice and active pressure in order to achieve full extension to the left knee. On exam the surgical site is healed completely over the anterior portion of the knee craniocaudal oriented. No evidence of surrounding erythema or skin breakdown. Range of motion is intact today -5 degrees to full extension and able to flex to 95 degrees. Over the lateral aspect of the left knee there is an area measured with irregular borders roughly 2 cm in diameter which reveals to light touch which is generally unchanged from just following surgery. Plan: Today we discussed the joint stiffness the patient has been experiencing and with the understanding that she had a lapse in her physical therapy due to increased pain within the past couple weeks I recommended that she try Voltaren gel 4 g topically to the left knee at least 3 times a day. She will continue her outpatient physical therapy services follow with our office in 30 days of. Today all of her questions answered to her satisfaction.

## 2023-04-21 ENCOUNTER — HOSPITAL ENCOUNTER (OUTPATIENT)
Facility: HOSPITAL | Age: 43
Setting detail: RECURRING SERIES
Discharge: HOME | End: 2023-04-24
Payer: COMMERCIAL

## 2023-04-21 PROCEDURE — 97530 THERAPEUTIC ACTIVITIES: CPT

## 2023-04-21 PROCEDURE — 97110 THERAPEUTIC EXERCISES: CPT

## 2023-04-21 PROCEDURE — 97112 NEUROMUSCULAR REEDUCATION: CPT

## 2023-04-25 ENCOUNTER — HOSPITAL ENCOUNTER (OUTPATIENT)
Facility: HOSPITAL | Age: 43
Setting detail: RECURRING SERIES
Discharge: HOME | End: 2023-04-28
Payer: COMMERCIAL

## 2023-04-25 PROCEDURE — 97112 NEUROMUSCULAR REEDUCATION: CPT

## 2023-04-25 PROCEDURE — 97530 THERAPEUTIC ACTIVITIES: CPT

## 2023-04-25 PROCEDURE — 97110 THERAPEUTIC EXERCISES: CPT

## 2023-04-25 NOTE — PROGRESS NOTES
address strength deficits, analyze and address soft tissue restrictions, analyze and cue for proper movement patterns, analyze and modify for postural abnormalities, analyze and address imbalance/dizziness, and instruct in home and community integration to address functional deficits and attain remaining goals. Progress toward goals / Updated goals:  []  See Progress Note/Recertification    New Goals to be achieved in __4-5_weeks_:    1. Decrease max pain rating by >/= 50%. PN Status:  Pain 4-6/10 today; has been up to 9/10 (Status at IE High pain ratings). Pain appears to be coming down  2. Increase left knee PROM for extension by >/= 8 degrees. PN Status:  Progressing:  to -10 deg with some end range discomfort (Status at IE to -13 degrees). Making progress  3. Increase left knee flexion PROM to >/= 115 deg. PN Status:  Progressing: To 94 degrees with pain near/at end range (Status at IE 91 degrees). Making progress  4. Improve FOTO to >/= 49  PN Status:  Progressing, to 40 today (Status at IE 32). Assess at 30 day simran  5. Increase left knee PROM to 0 ==> 125 deg. PN Status:  Progressing: -10 to 94 deg (Status at IE -13 ==> 91 deg). Making progress   6. Minimal gait deviation and no use of assistive device. PN Status:  Continued use of SPC and decreased left stance time/antalgic, lacking full knee ext (Status at IE right SPC, slowed and antalgic left with decreased stance time and pain in weightbearing, decreased left knee flexion and lacks full extension). Making progress, but still some deviations. 7.  Increase left LE MMT scores to >/= 4+/5 throughout. PN Status:  Progressing:  Hip IR 5-/5 with lateral knee pain; Hip ER 5-/5 with mild medial knee pain; Hip Flex 4+/5 with some lateral knee pain; Knee Flex 4/5 and OK; Knee Ext 3+/5 with sharp lateral knee pain; heel raises in SLS not tested/TBA (Status at IE Hip ER 3 to 3+/5 with knee pain;  Hip IR 3+ to 4-/5 with knee

## 2023-04-28 ENCOUNTER — HOSPITAL ENCOUNTER (OUTPATIENT)
Facility: HOSPITAL | Age: 43
Setting detail: RECURRING SERIES
Discharge: HOME | End: 2023-05-01
Payer: COMMERCIAL

## 2023-04-28 PROCEDURE — 97112 NEUROMUSCULAR REEDUCATION: CPT

## 2023-04-28 PROCEDURE — 97110 THERAPEUTIC EXERCISES: CPT

## 2023-04-28 PROCEDURE — 97535 SELF CARE MNGMENT TRAINING: CPT

## 2023-04-28 PROCEDURE — 97530 THERAPEUTIC ACTIVITIES: CPT

## 2023-05-01 ENCOUNTER — HOSPITAL ENCOUNTER (OUTPATIENT)
Facility: HOSPITAL | Age: 43
Setting detail: RECURRING SERIES
Discharge: HOME | End: 2023-05-04
Payer: COMMERCIAL

## 2023-05-01 PROCEDURE — 97112 NEUROMUSCULAR REEDUCATION: CPT

## 2023-05-01 PROCEDURE — 97110 THERAPEUTIC EXERCISES: CPT

## 2023-05-01 PROCEDURE — 97530 THERAPEUTIC ACTIVITIES: CPT

## 2023-05-01 NOTE — PROGRESS NOTES
PHYSICAL / OCCUPATIONAL THERAPY - DAILY TREATMENT NOTE (updated )    Patient Name: Robert Shepherd    Date: 2023    : 1980  Insurance: Payor: [facility] [employer] / Plan: [facility] [address] [address] / Product Type: *No Product type* /      Patient  verified Yes     Visit #   Current / Total 4 10   Time   In / Out 10:15 11:03   Pain   In / Out 6/10 5/10   Subjective Functional Status/Changes: Pt reports increased left knee pain today--drove 5 hours to/from [employer] yesterday. Changes to:  Meds, Allergies, Med Hx, Sx Hx? If yes, update Summary List no       TREATMENT AREA =  Pain in left knee [M25.562]    OBJECTIVE    Modality rationale: decrease edema, decrease inflammation, and decrease pain to improve the patients ability to transfer, walk for ADLs chores, community mobility with less pain. Min Type Additional Details    [] Estim:  []Unatt       []IFC  []Premod                        []Other:  []w/ice   []w/heat  Position:  Location:    [] Estim: []Att    []TENS instruct  []NMES                    []Other:  []w/US   []w/ice   []w/heat  Position:  Location:    []  Traction: [] Cervical       []Lumbar                       [] Prone          []Supine                       []Intermittent   []Continuous Lbs:  [] before manual  [] after manual    []  Ultrasound: []Continuous   [] Pulsed                           []1MHz   []3MHz Location:  W/cm2:    []  Iontophoresis with dexamethasone         Location: [] Take home patch   [] In clinic   10 [x]  Ice     []  heat  []  Ice massage  []  Laser   []  Anodyne Position:  supine with bolster.   Location:  left knee post-Tx.    []  Laser with stim  []  Other: Position:  Location:    []  Vasopneumatic Device  Pre-treatment girth:  Post-treatment girth:  Measured at (location):  Pressure:       [] lo [] med [] hi   Temperature: [] lo [] med [] hi   [x] Skin assessment post-treatment:  [x]intact []redness- no adverse reaction    []redness - adverse

## 2023-05-05 ENCOUNTER — HOSPITAL ENCOUNTER (OUTPATIENT)
Facility: HOSPITAL | Age: 43
Setting detail: RECURRING SERIES
Discharge: HOME | End: 2023-05-08
Payer: COMMERCIAL

## 2023-05-05 PROCEDURE — 97530 THERAPEUTIC ACTIVITIES: CPT

## 2023-05-05 PROCEDURE — 97110 THERAPEUTIC EXERCISES: CPT

## 2023-05-05 PROCEDURE — 97112 NEUROMUSCULAR REEDUCATION: CPT

## 2023-05-05 PROCEDURE — 97535 SELF CARE MNGMENT TRAINING: CPT

## 2023-05-08 ENCOUNTER — HOSPITAL ENCOUNTER (OUTPATIENT)
Facility: HOSPITAL | Age: 43
Setting detail: RECURRING SERIES
Discharge: HOME | End: 2023-05-11
Payer: COMMERCIAL

## 2023-05-08 PROCEDURE — 97530 THERAPEUTIC ACTIVITIES: CPT

## 2023-05-08 PROCEDURE — 97535 SELF CARE MNGMENT TRAINING: CPT

## 2023-05-08 PROCEDURE — 97110 THERAPEUTIC EXERCISES: CPT

## 2023-05-08 PROCEDURE — 97112 NEUROMUSCULAR REEDUCATION: CPT

## 2023-05-08 NOTE — PROGRESS NOTES
PHYSICAL / OCCUPATIONAL THERAPY - DAILY TREATMENT NOTE (updated )    Patient Name: Claudio Villafuerte    Date: 2023    : 1980  Insurance: Payor: [facility] [address] / Plan: [facility] [address] / Product Type: *No Product type* /      Patient  verified Yes     Visit #   Current / Total 6 10   Time   In / Out 930 1022   Pain   In / Out 4 4   Subjective Functional Status/Changes: Pt reports her pain is a little bit better today. Changes to:  Meds, Allergies, Med Hx, Sx Hx? If yes, update Summary List no       TREATMENT AREA =  Pain in left knee [M25.562]    OBJECTIVE    Modality rationale: decrease pain to improve the patients ability to perform ADLs after PT   Min Type Additional Details    [] Estim:  []Unatt       []IFC  []Premod                        []Other:  []w/ice   []w/heat  Position:  Location:    [] Estim: []Att    []TENS instruct  []NMES                    []Other:  []w/US   []w/ice   []w/heat  Position:  Location:    []  Traction: [] Cervical       []Lumbar                       [] Prone          []Supine                       []Intermittent   []Continuous Lbs:  [] before manual  [] after manual    []  Ultrasound: []Continuous   [] Pulsed                           []1MHz   []3MHz Location:  W/cm2:    []  Iontophoresis with dexamethasone         Location: [] Take home patch   [] In clinic   10 [x]  Ice     []  heat  []  Ice massage  []  Laser   []  Anodyne Position: supine  Location: left knee    []  Laser with stim  []  Other: Position:  Location:    []  Vasopneumatic Device  Pre-treatment girth:  Post-treatment girth:  Measured at (location):  Pressure:       [] lo [] med [] hi   Temperature: [] lo [] med [] hi   [] Skin assessment post-treatment:  [x]intact []redness- no adverse reaction    []redness - adverse reaction:        Therapeutic Procedures:     Tx Min Billable or 1:1 Min (if diff from Tx Min) Procedure, Rationale, Specifics   10  82883 Therapeutic Exercise

## 2023-05-12 ENCOUNTER — HOSPITAL ENCOUNTER (OUTPATIENT)
Facility: HOSPITAL | Age: 43
Setting detail: RECURRING SERIES
Discharge: HOME | End: 2023-05-15
Payer: COMMERCIAL

## 2023-05-12 PROCEDURE — 97530 THERAPEUTIC ACTIVITIES: CPT

## 2023-05-12 PROCEDURE — 97112 NEUROMUSCULAR REEDUCATION: CPT

## 2023-05-12 PROCEDURE — 97110 THERAPEUTIC EXERCISES: CPT

## 2023-05-16 ENCOUNTER — HOSPITAL ENCOUNTER (OUTPATIENT)
Facility: HOSPITAL | Age: 43
Setting detail: RECURRING SERIES
Discharge: HOME | End: 2023-05-19
Payer: COMMERCIAL

## 2023-05-16 PROCEDURE — 97110 THERAPEUTIC EXERCISES: CPT

## 2023-05-16 PROCEDURE — 97112 NEUROMUSCULAR REEDUCATION: CPT

## 2023-05-16 PROCEDURE — 97530 THERAPEUTIC ACTIVITIES: CPT

## 2023-05-16 NOTE — PROGRESS NOTES
PHYSICAL / OCCUPATIONAL THERAPY - DAILY TREATMENT NOTE (updated )    Patient Name: Kirsten Hernández    Date: 2023    : 1980  [facility]: [facility] [address] / [facility] [address] / Product Type: *No Product type* /      Patient  verified Yes     Visit #   Current / Total 1 10   Time   In / Out 1:31 2:15   Pain   In / Out 3/10 4/10   Subjective Functional Status/Changes: Some soreness pre-treatment. Changes to:  Meds, Allergies, Med Hx, Sx Hx? If yes, update Summary List no       TREATMENT AREA =  Pain in left knee [M25.562]    OBJECTIVE    Therapeutic Procedures: Tx Min Billable or 1:1 Min (if diff from Tx Min) Procedure, Rationale, Specifics   24  95652 Therapeutic Exercise (timed):  increase ROM, strength, coordination, balance, and proprioception to improve patient's ability to progress to PLOF and address remaining functional goals. (see flow sheet as applicable)     Details if applicable:       10  76491 Therapeutic Activity (timed):  use of dynamic activities replicating functional movements to increase ROM, strength, coordination, balance, and proprioception in order to improve patient's ability to progress to PLOF and address remaining functional goals. (see flow sheet as applicable)     Details if applicable:     10  87150 Neuromuscular Re-Education (timed):  improve balance, coordination, kinesthetic sense, posture, core stability and proprioception to improve patient's ability to develop conscious control of individual muscles and awareness of position of extremities in order to progress to PLOF and address remaining functional goals.  (see flow sheet as applicable)     Details if applicable:            Details if applicable:            Details if applicable:     40  [facility] Totals Reminder: bill using total billable min of TIMED therapeutic procedures (example: do not include dry needle or estim unattended, both untimed codes, in totals to left)  8-22 min = 1

## 2023-05-17 ENCOUNTER — OFFICE VISIT (OUTPATIENT)
Age: 43
End: 2023-05-17

## 2023-05-17 VITALS — BODY MASS INDEX: 31.09 KG/M2 | WEIGHT: 186.6 LBS | HEIGHT: 65 IN | TEMPERATURE: 98.6 F

## 2023-05-17 DIAGNOSIS — Z96.652 S/P TOTAL KNEE ARTHROPLASTY, LEFT: Primary | ICD-10-CM

## 2023-05-17 PROCEDURE — 99024 POSTOP FOLLOW-UP VISIT: CPT | Performed by: PHYSICIAN ASSISTANT

## 2023-05-17 NOTE — PROGRESS NOTES
Keyonna Casanova returns to the office for 3 months status post her primary left total knee replacement. Doing  well today. She is continuing physical therapy. She reports decreasing stiffness in the joint which is now only intermittent. She is using ice and active pressure in order to achieve full extension to the left knee. On exam the surgical site is healed completely over the anterior portion of the knee craniocaudal oriented. No evidence of surrounding erythema or skin breakdown. Range of motion is intact today -2 degrees to full extension and able to flex to 95 degrees. Over the lateral aspect of the left knee there is an area measured with irregular borders roughly 2 cm in diameter which reveals to light touch which is generally unchanged from just following surgery. Plan: Today we discussed alternatives to care to include but not limited to continued home self-guided physical therapies with attention to range of motion particularly to maintain the extension plane and improve the flexion plane. Patient was reminded of dental precautions status post total joint replacement. She will follow back with our office in about 3 months. Today all her questions answered to her satisfaction. Arielle Deal

## 2023-05-26 ENCOUNTER — HOSPITAL ENCOUNTER (OUTPATIENT)
Facility: HOSPITAL | Age: 43
Setting detail: RECURRING SERIES
Discharge: HOME | End: 2023-05-29
Payer: COMMERCIAL

## 2023-05-26 PROCEDURE — 97530 THERAPEUTIC ACTIVITIES: CPT

## 2023-05-26 PROCEDURE — 97535 SELF CARE MNGMENT TRAINING: CPT

## 2023-05-26 PROCEDURE — 97112 NEUROMUSCULAR REEDUCATION: CPT

## 2023-05-26 PROCEDURE — 97110 THERAPEUTIC EXERCISES: CPT

## 2023-05-30 ENCOUNTER — HOSPITAL ENCOUNTER (OUTPATIENT)
Facility: HOSPITAL | Age: 43
Setting detail: RECURRING SERIES
Discharge: HOME | End: 2023-06-02
Payer: COMMERCIAL

## 2023-05-30 PROCEDURE — 97530 THERAPEUTIC ACTIVITIES: CPT

## 2023-05-30 PROCEDURE — 97112 NEUROMUSCULAR REEDUCATION: CPT

## 2023-05-30 PROCEDURE — 97110 THERAPEUTIC EXERCISES: CPT

## 2023-06-01 ENCOUNTER — HOSPITAL ENCOUNTER (OUTPATIENT)
Facility: HOSPITAL | Age: 43
Setting detail: RECURRING SERIES
Discharge: HOME | End: 2023-06-04
Payer: COMMERCIAL

## 2023-06-01 PROCEDURE — 97530 THERAPEUTIC ACTIVITIES: CPT

## 2023-06-01 PROCEDURE — 97112 NEUROMUSCULAR REEDUCATION: CPT

## 2023-06-01 PROCEDURE — 97110 THERAPEUTIC EXERCISES: CPT

## 2023-06-01 RX ORDER — DOCUSATE SODIUM 100 MG/1
CAPSULE, LIQUID FILLED ORAL
Qty: 60 CAPSULE | Refills: 0 | OUTPATIENT
Start: 2023-06-01

## 2023-06-05 ENCOUNTER — HOSPITAL ENCOUNTER (OUTPATIENT)
Facility: HOSPITAL | Age: 43
Setting detail: RECURRING SERIES
Discharge: HOME | End: 2023-06-08
Payer: COMMERCIAL

## 2023-06-05 PROCEDURE — 97112 NEUROMUSCULAR REEDUCATION: CPT

## 2023-06-05 PROCEDURE — 97530 THERAPEUTIC ACTIVITIES: CPT

## 2023-06-05 PROCEDURE — 97110 THERAPEUTIC EXERCISES: CPT

## 2023-06-05 NOTE — PROGRESS NOTES
PHYSICAL / OCCUPATIONAL THERAPY - DAILY TREATMENT NOTE (updated )    Patient Name: Zeus Daugherty    Date: 2023    : 1980  Insurance: Payor: [facility] [address] / Plan: [facility] [address] / Product Type: *No Product type* /      Patient  verified Yes     Visit #   Current / Total 5 10   Time   In / Out 930 1011   Pain   In / Out 3 3   Subjective Functional Status/Changes: Pt reports increased pain and stiffness since yesterday when she was doing her HEP and trying to increased her knee flexion. Changes to:  Meds, Allergies, Med Hx, Sx Hx? If yes, update Summary List no       TREATMENT AREA =  Pain in left knee [M25.562]    OBJECTIVE    Modality rationale: decrease pain to improve the patients ability to perform ADLs   After PT   Min Type Additional Details    [] Estim:  []Unatt       []IFC  []Premod                        []Other:  []w/ice   []w/heat  Position:  Location:    [] Estim: []Att    []TENS instruct  []NMES                    []Other:  []w/US   []w/ice   []w/heat  Position:  Location:    []  Traction: [] Cervical       []Lumbar                       [] Prone          []Supine                       []Intermittent   []Continuous Lbs:  [] before manual  [] after manual    []  Ultrasound: []Continuous   [] Pulsed                           []1MHz   []3MHz Location:  W/cm2:    []  Iontophoresis with dexamethasone         Location: [] Take home patch   [] In clinic   5 [x]  Ice     []  heat  []  Ice massage  []  Laser   []  Anodyne Position: long sitting  Location: left knee    []  Laser with stim  []  Other: Position:  Location:    []  Vasopneumatic Device  Pre-treatment girth:  Post-treatment girth:  Measured at (location):  Pressure:       [] lo [] med [] hi   Temperature: [] lo [] med [] hi   [] Skin assessment post-treatment:  [x]intact []redness- no adverse reaction    []redness - adverse reaction:        Therapeutic Procedures:     Tx Min Billable or 1:1 Min (if diff from

## 2023-06-07 ENCOUNTER — HOSPITAL ENCOUNTER (OUTPATIENT)
Facility: HOSPITAL | Age: 43
Setting detail: RECURRING SERIES
Discharge: HOME | End: 2023-06-10
Payer: COMMERCIAL

## 2023-06-07 PROCEDURE — 97110 THERAPEUTIC EXERCISES: CPT

## 2023-06-07 PROCEDURE — 97535 SELF CARE MNGMENT TRAINING: CPT

## 2023-06-07 PROCEDURE — 97112 NEUROMUSCULAR REEDUCATION: CPT

## 2023-06-07 PROCEDURE — 97530 THERAPEUTIC ACTIVITIES: CPT

## 2023-06-07 NOTE — PROGRESS NOTES
HR in SLS = 12     Functional Gains: strength, mobility, walking without cane mostly  Functional Deficits: buckling, increased pain recently, tightness, negotiating stairs, getting up after prolonged sitting  % improvement: 90%  Pain   Average: 3/10                  Best: 3/10                Worst: 5/10  Patient Goal: \"No pain, no buckling, to bend it all the way back, walk without a limp\"      New Goals to be achieved in __5 weeks__    1. Pt will compliance HEP in order to supplement PT treatment  PN status = ongoing - continued compliance  2. Pt will demonstrate left knee AROM to 0-110degrees in order to improve ability to ascend/descend curbs in the community   PN status:  = Progressing: lacking 2-98 deg AROM  3. Pt will demonstrate left knee flexion strength minimum 5-/5 in order to improve ambulation distances for ADLs and community engagement. PN status = progressing = 4-/5  4. Pt will score at least 49 on FOTO in order to improve overall function, decrease pain, and facilitate return to PLOF. PN status = regressed due to exacerbation of symptoms = 37      RECOMMENDATIONS  Continue PT 2x/week for 10 visits    If you have any questions/comments please contact us directly at [phone]   Thank you for allowing us to assist in the care of your patient.   PTA Signature Nando Duke PTA     Therapist Signature: Parth Mckeon DPT, OCS, CMTPT/DN Date: 6/7/2023   Reporting Period  05/12/2023-06/07/2023 Time: 11:41 AM

## 2023-06-07 NOTE — PROGRESS NOTES
Tx Min) Procedure, Rationale, Specifics   10  07255 Therapeutic Exercise (timed):  increase ROM, strength, coordination, balance, and proprioception to improve patient's ability to progress to PLOF and address remaining functional goals. (see flow sheet as applicable)     Details if applicable:       10  95232 Neuromuscular Re-Education (timed):  improve balance, coordination, kinesthetic sense, posture, core stability and proprioception to improve patient's ability to develop conscious control of individual muscles and awareness of position of extremities in order to progress to PLOF and address remaining functional goals. (see flow sheet as applicable)     Details if applicable:  quad re-ed, balance, HS re-ed   12  69918 Therapeutic Activity (timed):  use of dynamic activities replicating functional movements to increase ROM, strength, coordination, balance, and proprioception in order to improve patient's ability to progress to PLOF and address remaining functional goals. (see flow sheet as applicable)     Details if applicable:  FOTO, goals assessment   8  35112 Self Care/Home Management (timed):  improve patient knowledge and understanding of pain reducing techniques, home safety, and activity modification  to improve patient's ability to progress to PLOF and address remaining functional goals.   (see flow sheet as applicable)     Details if applicable:  pt ed temp cane use, reduce intensity of HEP performance while pain elevated   40  [facility] Totals Reminder: bill using total billable min of TIMED therapeutic procedures (example: do not include dry needle or estim unattended, both untimed codes, in totals to left)  8-22 min = 1 unit; 23-37 min = 2 units; 38-52 min = 3 units; 53-67 min = 4 units; 68-82 min = 5 units   Total Total     []  Patient Education billed concurrently with other procedures   [x] Review HEP    [] Progressed/Changed HEP, detail:    [] Other detail:       Objective Information/Functional

## 2023-07-03 ENCOUNTER — HOSPITAL ENCOUNTER (OUTPATIENT)
Facility: HOSPITAL | Age: 43
Setting detail: RECURRING SERIES
Discharge: HOME | End: 2023-07-06
Payer: COMMERCIAL

## 2023-07-03 PROCEDURE — 97112 NEUROMUSCULAR REEDUCATION: CPT

## 2023-07-03 PROCEDURE — 97530 THERAPEUTIC ACTIVITIES: CPT

## 2023-07-03 PROCEDURE — 97110 THERAPEUTIC EXERCISES: CPT

## 2023-07-03 NOTE — PROGRESS NOTES
Education billed concurrently with other procedures   [x] Review HEP    [] Progressed/Changed HEP, detail:    [] Other detail:       Objective Information/Functional Measures/Assessment    Pt tolerating treatment well after a hiatus from PT. Noting no regression of objective measures but continues to be limited with HS strength & Left knee AROM in both planes. Reincorporating POC with emphasis on regain knee mobility & quad/proximal hip strenghtneing. Patient will continue to benefit from skilled PT / OT services to modify and progress therapeutic interventions, analyze and address functional mobility deficits, analyze and address ROM deficits, analyze and address strength deficits, analyze and address soft tissue restrictions, analyze and cue for proper movement patterns, analyze and modify for postural abnormalities, analyze and address imbalance/dizziness, and instruct in home and community integration to address functional deficits and attain remaining goals. Progress toward goals / Updated goals:  []  See Progress Note/Recertification    1. Pt will compliance HEP in order to supplement PT treatment  PN status = ongoing - continued compliance  Current = Reports that she's had a hard time keeping up with the exercises. 2.   Pt will demonstrate left knee AROM to 0-110degrees in order to improve ability to ascend/descend curbs in the community              PN status:  = Progressing: lacking 2-98 deg AROM   Current =lacking 1-100 deg. AROM  3. Pt will demonstrate left knee flexion strength minimum 5-/5 in order to improve ambulation distances for ADLs and community engagement. PN status = progressing = 4-/5   Current = No change 4-/5 limited by pain in front of knee  4. Pt will score at least 49 on FOTO in order to improve overall function, decrease pain, and facilitate return to PLOF. PN status = regressed due to exacerbation of symptoms = 37   Assess at next visit.

## 2023-07-05 ENCOUNTER — HOSPITAL ENCOUNTER (OUTPATIENT)
Facility: HOSPITAL | Age: 43
Setting detail: RECURRING SERIES
Discharge: HOME | End: 2023-07-08
Payer: COMMERCIAL

## 2023-07-05 PROCEDURE — 97112 NEUROMUSCULAR REEDUCATION: CPT

## 2023-07-05 PROCEDURE — 97535 SELF CARE MNGMENT TRAINING: CPT

## 2023-07-05 PROCEDURE — 97530 THERAPEUTIC ACTIVITIES: CPT

## 2023-07-05 PROCEDURE — 97110 THERAPEUTIC EXERCISES: CPT

## 2023-07-05 NOTE — PROGRESS NOTES
[facility] - [address] [place] [address], [id number] [place] [address]  [phone] fax    PROGRESS NOTE  Patient Name: Tyler Weiss : 1980   Treatment/Medical Diagnosis: Pain in left knee [M25.562]   Referral Source: Tara Silva     Date of Initial Visit: 2023 Attended Visits: 23 Missed Visits: 0     SUMMARY OF TREATMENT  Ms. Jorge Adams reports 85% improvement since beginning PT. Pt demonstrates an increase in her left knee ROM, but still lacking end range flexion. Some strength deficits still noted in right knee. Reports still having difficulty with prolonged periods of walking without her SPC and still has pain with ascending stairs. Continues to walk with an antalgic gait. We will continue with PT to address her remaining functional deficits. CURRENT STATUS  1. Pt will compliance HEP in order to supplement PT treatment  PN status = ongoing - continued compliance  MET  2. Pt will demonstrate left knee AROM to 0-110degrees in order to improve ability to ascend/descend curbs in the community              PN status:  = Progressing: lacking 2-98 deg AROM              PROGRESSING; lacking 1-102 deg AROM  3. Pt will demonstrate left knee flexion strength minimum 5-/5 in order to improve ambulation distances for ADLs and community engagement. PN status = progressing = 4-/5              NOT MET; 4-/5  4. Pt will score at least 49 on FOTO in order to improve overall function, decrease pain, and facilitate return to PLOF.               PN status = regressed due to exacerbation of symptoms = 37              PROGRESSING; 43     Functional Gains: mobility, ambulation without AD, decreased antalgic gait and limp, knee extension, strength  Functional Deficits: bending knee, ascending/descending stairs, prolonged periods of walking/standing, getting in/out car, need for SPC with prolonged walking  % improvement: 85%  Pain   Average: 3-410
proprioception to improve patient's ability to progress to PLOF and address remaining functional goals. (see flow sheet as applicable)     Details if applicable:       10  77458 Neuromuscular Re-Education (timed):  improve balance, coordination, kinesthetic sense, posture, core stability and proprioception to improve patient's ability to develop conscious control of individual muscles and awareness of position of extremities in order to progress to PLOF and address remaining functional goals. (see flow sheet as applicable)     Details if applicable:     15  41299 Therapeutic Activity (timed):  use of dynamic activities replicating functional movements to increase ROM, strength, coordination, balance, and proprioception in order to improve patient's ability to progress to PLOF and address remaining functional goals. (see flow sheet as applicable)     Details if applicable:     8  61312 Self Care/Home Management (timed):  improve patient knowledge and understanding of pain reducing techniques, positioning, posture/ergonomics, home safety, activity modification, diagnosis/prognosis, and physical therapy expectations, procedures and progression  to improve patient's ability to progress to PLOF and address remaining functional goals. (see flow sheet as applicable)     Details if applicable:     37  [facility] Totals Reminder: bill using total billable min of TIMED therapeutic procedures (example: do not include dry needle or estim unattended, both untimed codes, in totals to left)  8-22 min = 1 unit; 23-37 min = 2 units; 38-52 min = 3 units; 53-67 min = 4 units; 68-82 min = 5 units   Total Total     [x]  Patient Education billed concurrently with other procedures   [x] Review HEP    [] Progressed/Changed HEP, detail:    [] Other detail:       Objective Information/Functional Measures/Assessment    FOTO 43    AROM left knee 1-102 deg    MMT left knee flexion 4-/5    Ms. Katrin Owens reports 85% improvement since beginning PT.  Pt

## 2023-07-10 ENCOUNTER — HOSPITAL ENCOUNTER (OUTPATIENT)
Facility: HOSPITAL | Age: 43
Setting detail: RECURRING SERIES
Discharge: HOME | End: 2023-07-13
Payer: COMMERCIAL

## 2023-07-10 PROCEDURE — 97530 THERAPEUTIC ACTIVITIES: CPT

## 2023-07-10 PROCEDURE — 97112 NEUROMUSCULAR REEDUCATION: CPT

## 2023-07-10 PROCEDURE — 97110 THERAPEUTIC EXERCISES: CPT

## 2023-07-10 NOTE — PROGRESS NOTES
PHYSICAL / OCCUPATIONAL THERAPY - DAILY TREATMENT NOTE (updated )    Patient Name: Annelise Hudson    Date: 7/10/2023    : 1980  Insurance: Payor: Ole / Plan: Ole / Product Type: *No Product type* /      Patient  verified Yes     Visit #   Current / Total 1 10   Time   In / Out 850 933   Pain   In / Out 3-4 2-3   Subjective Functional Status/Changes: \"My knee is ok. \"     TREATMENT AREA =  Pain in left knee [M25.562]    OBJECTIVE         Therapeutic Procedures: Tx Min Billable or 1:1 Min (if diff from Tx Min) Procedure, Rationale, Specifics   13  83397 Therapeutic Exercise (timed):  increase ROM, strength, coordination, balance, and proprioception to improve patient's ability to progress to PLOF and address remaining functional goals. (see flow sheet as applicable)     Details if applicable:       20  84311 Neuromuscular Re-Education (timed):  improve balance, coordination, kinesthetic sense, posture, core stability and proprioception to improve patient's ability to develop conscious control of individual muscles and awareness of position of extremities in order to progress to PLOF and address remaining functional goals. (see flow sheet as applicable)     Details if applicable:     10  49675 Therapeutic Activity (timed):  use of dynamic activities replicating functional movements to increase ROM, strength, coordination, balance, and proprioception in order to improve patient's ability to progress to PLOF and address remaining functional goals.   (see flow sheet as applicable)     Details if applicable:     37  [facility] Totals Reminder: bill using total billable min of TIMED therapeutic procedures (example: do not include dry needle or estim unattended, both untimed codes, in totals to left)  8-22 min = 1 unit; 23-37 min = 2 units; 38-52 min = 3 units; 53-67 min = 4 units; 68-82 min = 5 units   Total Total     [x]  Patient Education billed concurrently with

## 2023-07-12 ENCOUNTER — HOSPITAL ENCOUNTER (OUTPATIENT)
Facility: HOSPITAL | Age: 43
Setting detail: RECURRING SERIES
Discharge: HOME | End: 2023-07-15
Payer: COMMERCIAL

## 2023-07-12 PROCEDURE — 97112 NEUROMUSCULAR REEDUCATION: CPT

## 2023-07-12 PROCEDURE — 97110 THERAPEUTIC EXERCISES: CPT

## 2023-07-12 PROCEDURE — 97530 THERAPEUTIC ACTIVITIES: CPT

## 2023-07-12 NOTE — PROGRESS NOTES
PHYSICAL / OCCUPATIONAL THERAPY - DAILY TREATMENT NOTE (updated )    Patient Name: Mazin Lazcano    Date: 2023    : 1980  Insurance: Payor: Ole / Plan: Ole / Product Type: *No Product type* /      Patient  verified Yes     Visit #   Current / Total 2 10   Time   In / Out 848 933   Pain   In / Out 2 2-3   Subjective Functional Status/Changes: \"I'm doing ok. \"     TREATMENT AREA =  Pain in left knee [M25.562]    OBJECTIVE    Modalities Rationale:     decrease pain and increase tissue extensibility to improve patient's ability to progress to PLOF and address remaining functional goals. min [] Estim Unattended, type/location:                                      []  w/ice    []  w/heat    min [] Estim Attended, type/location:                                     []  w/US     []  w/ice    []  w/heat    []  TENS insruct      min []  Mechanical Traction: type/lbs                   []  pro   []  sup   []  int   []  cont    []  before manual    []  after manual    min []  Ultrasound, settings/location:      min []  Iontophoresis w/ dexamethasone, location:                                               []  take home patch       []  in clinic   10 min  unbill [x]  Ice     []  Heat    location/position:  Left knee, supine with wedge    min []  Paraffin,  details:     min []  Vasopneumatic Device, press/temp:     min []  [place] / [place]: If using vaso (only need to measure limb vaso being performed on)      pre-treatment girth :       post-treatment girth :       measured at (landmark location) :      min []  Other:    Skin assessment post-treatment (if applicable):    [x]  intact    [x]  redness- no adverse reaction                 []redness - adverse reaction:         Therapeutic Procedures:     Tx Min Billable or 1:1 Min (if diff from Tx Min) Procedure, Rationale, Specifics   10  96400 Therapeutic Exercise (timed):  increase ROM, strength, coordination,

## 2023-07-17 ENCOUNTER — HOSPITAL ENCOUNTER (OUTPATIENT)
Facility: HOSPITAL | Age: 43
Setting detail: RECURRING SERIES
Discharge: HOME | End: 2023-07-20
Payer: COMMERCIAL

## 2023-07-17 PROCEDURE — 97110 THERAPEUTIC EXERCISES: CPT

## 2023-07-17 PROCEDURE — 97112 NEUROMUSCULAR REEDUCATION: CPT

## 2023-07-17 PROCEDURE — 97530 THERAPEUTIC ACTIVITIES: CPT

## 2023-07-19 ENCOUNTER — HOSPITAL ENCOUNTER (OUTPATIENT)
Facility: HOSPITAL | Age: 43
Setting detail: RECURRING SERIES
Discharge: HOME | End: 2023-07-22
Payer: COMMERCIAL

## 2023-07-19 PROCEDURE — 97112 NEUROMUSCULAR REEDUCATION: CPT

## 2023-07-19 PROCEDURE — 97110 THERAPEUTIC EXERCISES: CPT

## 2023-07-19 PROCEDURE — 97530 THERAPEUTIC ACTIVITIES: CPT

## 2023-07-19 NOTE — PROGRESS NOTES
PHYSICAL / OCCUPATIONAL THERAPY - DAILY TREATMENT NOTE (updated )    Patient Name: Paulina De Jesus    Date: 2023    : 1980  Insurance: Payor: Ole / Plan: Ole / Product Type: *No Product type* /      Patient  verified Yes     Visit #   Current / Total 4 10   Time   In / Out 330 409   Pain   In / Out 2 2   Subjective Functional Status/Changes: Patient reports her knee is a little achy because of the rain, but it was really good yesterday. TREATMENT AREA =  Pain in left knee [M25.562]    OBJECTIVE    Therapeutic Procedures: Tx Min Billable or 1:1 Min (if diff from Tx Min) Procedure, Rationale, Specifics   15  84666 Therapeutic Exercise (timed):  increase ROM, strength, coordination, balance, and proprioception to improve patient's ability to progress to PLOF and address remaining functional goals. (see flow sheet as applicable)     Details if applicable:       9  00549 Neuromuscular Re-Education (timed):  improve balance, coordination, kinesthetic sense, posture, core stability and proprioception to improve patient's ability to develop conscious control of individual muscles and awareness of position of extremities in order to progress to PLOF and address remaining functional goals. (see flow sheet as applicable)     Details if applicable:  quad/HS re-ed, balance   15  87721 Therapeutic Activity (timed):  use of dynamic activities replicating functional movements to increase ROM, strength, coordination, balance, and proprioception in order to improve patient's ability to progress to PLOF and address remaining functional goals.   (see flow sheet as applicable)     Details if applicable:  squats, step ups   39  [facility] Totals Reminder: bill using total billable min of TIMED therapeutic procedures (example: do not include dry needle or estim unattended, both untimed codes, in totals to left)  8-22 min = 1 unit; 23-37 min = 2 units; 38-52 min = 3 units;

## 2023-07-24 ENCOUNTER — APPOINTMENT (OUTPATIENT)
Facility: HOSPITAL | Age: 43
End: 2023-07-24
Payer: COMMERCIAL

## 2023-07-26 ENCOUNTER — HOSPITAL ENCOUNTER (OUTPATIENT)
Facility: HOSPITAL | Age: 43
Setting detail: RECURRING SERIES
Discharge: HOME | End: 2023-07-29
Payer: COMMERCIAL

## 2023-07-26 PROCEDURE — 97112 NEUROMUSCULAR REEDUCATION: CPT

## 2023-07-26 PROCEDURE — 97110 THERAPEUTIC EXERCISES: CPT

## 2023-07-26 PROCEDURE — 97535 SELF CARE MNGMENT TRAINING: CPT

## 2023-07-26 PROCEDURE — 97530 THERAPEUTIC ACTIVITIES: CPT

## 2023-07-26 NOTE — PROGRESS NOTES
PHYSICAL / OCCUPATIONAL THERAPY - DAILY TREATMENT NOTE (updated )    Patient Name: Malick Chavira    Date: 2023    : 1980  Insurance: Payor: Ole / Plan: Ole / Product Type: *No Product type* /      Patient  verified Yes     Visit #   Current / Total 5 10   Time   In / Out 810 902   Pain   In / Out 2 2   Subjective Functional Status/Changes: Pt reports that knee feels stronger, says she's been trying to exercises but knee feels stiff. Stairs is still very difficult & going 1 step at a time. TREATMENT AREA =  Pain in left knee [M25.562]    OBJECTIVE    Modalities Rationale:     decrease edema, decrease inflammation, and decrease pain to improve patient's ability to progress to PLOF and address remaining functional goals. min [] Estim Unattended, type/location:                                      []  w/ice    []  w/heat    min [] Estim Attended, type/location:                                     []  w/US     []  w/ice    []  w/heat    []  TENS insruct      min []  Mechanical Traction: type/lbs                   []  pro   []  sup   []  int   []  cont    []  before manual    []  after manual    min []  Ultrasound, settings/location:      min []  Iontophoresis w/ dexamethasone, location:                                               []  take home patch       []  in clinic   10 min  unbill [x]  Ice     []  Heat    location/position: Supine with ice on left knee. min []  Paraffin,  details:     min []  Vasopneumatic Device, press/temp:     min []  Aletha Belt / Castillo Lefort: If using vaso (only need to measure limb vaso being performed on)      pre-treatment girth :       post-treatment girth :       measured at (landmark location) :      min []  Other:    Skin assessment post-treatment (if applicable):    []  intact    []  redness- no adverse reaction                 []redness - adverse reaction:         Therapeutic Procedures:     Tx Min Billable or

## 2023-08-02 ENCOUNTER — HOSPITAL ENCOUNTER (OUTPATIENT)
Facility: HOSPITAL | Age: 43
Setting detail: RECURRING SERIES
Discharge: HOME | End: 2023-08-05
Payer: COMMERCIAL

## 2023-08-02 PROCEDURE — 97110 THERAPEUTIC EXERCISES: CPT

## 2023-08-02 PROCEDURE — 97112 NEUROMUSCULAR REEDUCATION: CPT

## 2023-08-02 PROCEDURE — 97535 SELF CARE MNGMENT TRAINING: CPT

## 2023-08-02 PROCEDURE — 97530 THERAPEUTIC ACTIVITIES: CPT

## 2023-08-02 NOTE — PROGRESS NOTES
[facility] - [address] [place], [address] [facility]  [phone] fax    PROGRESS NOTE  Patient Name: Dillan Valadez : 1980   Treatment/Medical Diagnosis: Pain in left knee [M25.562]   Referral Source: Emre Cabrera     Date of Initial Visit: 3/21/23 Attended Visits: 29 Missed Visits: 1     SUMMARY OF TREATMENT    Pt reporting 90% improvement since starting PT, despite subjective improvement patient demonstrated regression of functional gains at the start of this current reporting period due to going on vacation for 1 month resulting in a lapse in care. She states that since starting PT walking has improved as she doesn't require an AD & is not limping as much. Standing tolerance has improved since initial eval but still unable to tolerate 1 hour of standing due to exacerbation of symptoms resulting in increased swelling & pain. Stairs continues to be difficult for her at home but does report that her left knee does not buckle. Continues to have apprehension with functional activities due to fear of exacerbating her knee symptoms. Bending is also still difficult secondary to pain and stiffness. Hamstring strength continues to be limited due to anterior knee pain. Despite continued difficulty with objective measures, she was able to report a higher FOTO score meeting her long-term goal showing self reported functional improvement since her last PN. Pt reports that she see's her MD later this month for a f/u for her left knee. Pt would continue to benefit from skilled PT for another 10 visits 2 times a week to improve her knee bend & pain control to improve her overall funciton & quality of life. CURRENT STATUS    Functional Gains: Walking has gotten better as she is not limping as much & not currently using the cane, stairs is better but still difficult. Standing tolerance has improved since starting PT.    Functional Deficits: Stairs is
(timed):  increase ROM, strength, coordination, balance, and proprioception to improve patient's ability to progress to PLOF and address remaining functional goals. (see flow sheet as applicable)     Details if applicable:       10 10 97990 Neuromuscular Re-Education (timed):  improve balance, coordination, kinesthetic sense, posture, core stability and proprioception to improve patient's ability to develop conscious control of individual muscles and awareness of position of extremities in order to progress to PLOF and address remaining functional goals. (see flow sheet as applicable)     Details if applicable:     10 10 82389 Therapeutic Activity (timed):  use of dynamic activities replicating functional movements to increase ROM, strength, coordination, balance, and proprioception in order to improve patient's ability to progress to PLOF and address remaining functional goals. (see flow sheet as applicable)     Details if applicable:  Reassessment, FOTO, Goals   10 10 38771 Self Care/Home Management (timed):  improve patient knowledge and understanding of pain reducing techniques, posture/ergonomics, activity modification, diagnosis/prognosis, and physical therapy expectations, procedures and progression  to improve patient's ability to progress to PLOF and address remaining functional goals.   (see flow sheet as applicable)     Details if applicable:  patient education   40 [id number] [facility] BC Totals Reminder: bill using total billable min of TIMED therapeutic procedures (example: do not include dry needle or estim unattended, both untimed codes, in totals to left)  8-22 min = 1 unit; 23-37 min = 2 units; 38-52 min = 3 units; 53-67 min = 4 units; 68-82 min = 5 units   Total Total     [x]  Patient Education billed concurrently with other procedures   [x] Review HEP    [] Progressed/Changed HEP, detail:    [] Other detail:       Objective Information/Functional Measures/Assessment       Pt reporting 90% improvement since

## 2023-08-04 ENCOUNTER — HOSPITAL ENCOUNTER (OUTPATIENT)
Facility: HOSPITAL | Age: 43
Setting detail: RECURRING SERIES
Discharge: HOME | End: 2023-08-07
Payer: COMMERCIAL

## 2023-08-04 PROCEDURE — 97112 NEUROMUSCULAR REEDUCATION: CPT

## 2023-08-04 PROCEDURE — 97530 THERAPEUTIC ACTIVITIES: CPT

## 2023-08-04 PROCEDURE — 97110 THERAPEUTIC EXERCISES: CPT

## 2023-08-04 NOTE — PROGRESS NOTES
patient's ability to progress to PLOF and address remaining functional goals. (see flow sheet as applicable)     Details if applicable:       15  13488 Neuromuscular Re-Education (timed):  improve balance, coordination, kinesthetic sense, posture, core stability and proprioception to improve patient's ability to develop conscious control of individual muscles and awareness of position of extremities in order to progress to PLOF and address remaining functional goals. (see flow sheet as applicable)     Details if applicable:  quad, glut charbel   15  29013 Therapeutic Activity (timed):  use of dynamic activities replicating functional movements to increase ROM, strength, coordination, balance, and proprioception in order to improve patient's ability to progress to PLOF and address remaining functional goals. (see flow sheet as applicable)     Details if applicable:  functional LE strength    39  [facility] BC Totals Reminder: bill using total billable min of TIMED therapeutic procedures (example: do not include dry needle or estim unattended, both untimed codes, in totals to left)  8-22 min = 1 unit; 23-37 min = 2 units; 38-52 min = 3 units; 53-67 min = 4 units; 68-82 min = 5 units   Total Total     [x]  Patient Education billed concurrently with other procedures   [x] Review HEP    [] Progressed/Changed HEP, detail:    [] Other detail:       Objective Information/Functional Measures/Assessment    Pt tolerated progression of exercises with minimal increase in symptoms. She was unable to perform SL LE extension, therefore modified to perform B concentrically and unilaterally on the left with eccentrics.  Cues with step ups to utilize knee flexion to ascend/descend and prevent circumduction    Patient will continue to benefit from skilled PT / OT services to modify and progress therapeutic interventions, analyze and address functional mobility deficits, analyze and address ROM deficits, analyze and address strength deficits,

## 2023-08-07 ENCOUNTER — HOSPITAL ENCOUNTER (OUTPATIENT)
Facility: HOSPITAL | Age: 43
Setting detail: RECURRING SERIES
Discharge: HOME | End: 2023-08-10
Payer: COMMERCIAL

## 2023-08-07 PROCEDURE — 97530 THERAPEUTIC ACTIVITIES: CPT

## 2023-08-07 PROCEDURE — 97112 NEUROMUSCULAR REEDUCATION: CPT

## 2023-08-07 PROCEDURE — 97110 THERAPEUTIC EXERCISES: CPT

## 2023-08-10 ENCOUNTER — HOSPITAL ENCOUNTER (OUTPATIENT)
Facility: HOSPITAL | Age: 43
Setting detail: RECURRING SERIES
Discharge: HOME | End: 2023-08-13
Payer: COMMERCIAL

## 2023-08-10 PROCEDURE — 97112 NEUROMUSCULAR REEDUCATION: CPT

## 2023-08-10 PROCEDURE — 97530 THERAPEUTIC ACTIVITIES: CPT

## 2023-08-10 PROCEDURE — 97535 SELF CARE MNGMENT TRAINING: CPT

## 2023-08-10 PROCEDURE — 97110 THERAPEUTIC EXERCISES: CPT

## 2023-08-10 NOTE — PROGRESS NOTES
PHYSICAL / OCCUPATIONAL THERAPY - DAILY TREATMENT NOTE (updated )    Patient Name: Sherri Phalen    Date: 8/10/2023    : 1980  Insurance: Payor: Ole / Plan: Ole / Product Type: *No Product type* /      Patient  verified Yes     Visit #   Current / Total 3 10   Time   In / Out 1010 1056   Pain   In / Out 2 2   Subjective Functional Status/Changes: \"My knee was swollen yesterday. \"     TREATMENT AREA =  Pain in left knee [M25.562]    OBJECTIVE         Therapeutic Procedures: Tx Min Billable or 1:1 Min (if diff from Tx Min) Procedure, Rationale, Specifics   10  61579 Therapeutic Exercise (timed):  increase ROM, strength, coordination, balance, and proprioception to improve patient's ability to progress to PLOF and address remaining functional goals. (see flow sheet as applicable)     Details if applicable:       18  04118 Neuromuscular Re-Education (timed):  improve balance, coordination, kinesthetic sense, posture, core stability and proprioception to improve patient's ability to develop conscious control of individual muscles and awareness of position of extremities in order to progress to PLOF and address remaining functional goals. (see flow sheet as applicable)     Details if applicable:  Including kinesiotaping for swelling in left knee   10  23682 Therapeutic Activity (timed):  use of dynamic activities replicating functional movements to increase ROM, strength, coordination, balance, and proprioception in order to improve patient's ability to progress to PLOF and address remaining functional goals.   (see flow sheet as applicable)     Details if applicable:     8  48680 Self Care/Home Management (timed):  improve patient knowledge and understanding of pain reducing techniques, positioning, posture/ergonomics, home safety, activity modification, diagnosis/prognosis, and physical therapy expectations, procedures and progression  to improve patient's

## 2023-08-14 ENCOUNTER — APPOINTMENT (OUTPATIENT)
Facility: HOSPITAL | Age: 43
End: 2023-08-14
Payer: COMMERCIAL

## 2023-08-16 ENCOUNTER — HOSPITAL ENCOUNTER (OUTPATIENT)
Facility: HOSPITAL | Age: 43
Setting detail: RECURRING SERIES
Discharge: HOME | End: 2023-08-19
Payer: COMMERCIAL

## 2023-08-16 PROCEDURE — 97530 THERAPEUTIC ACTIVITIES: CPT

## 2023-08-16 PROCEDURE — 97110 THERAPEUTIC EXERCISES: CPT

## 2023-08-16 PROCEDURE — 97535 SELF CARE MNGMENT TRAINING: CPT

## 2023-08-16 PROCEDURE — 97112 NEUROMUSCULAR REEDUCATION: CPT

## 2023-08-18 ENCOUNTER — HOSPITAL ENCOUNTER (OUTPATIENT)
Facility: HOSPITAL | Age: 43
Setting detail: RECURRING SERIES
Discharge: HOME | End: 2023-08-21
Payer: COMMERCIAL

## 2023-08-18 PROCEDURE — 97535 SELF CARE MNGMENT TRAINING: CPT

## 2023-08-18 PROCEDURE — 97110 THERAPEUTIC EXERCISES: CPT

## 2023-08-18 PROCEDURE — 97530 THERAPEUTIC ACTIVITIES: CPT

## 2023-08-18 PROCEDURE — 97112 NEUROMUSCULAR REEDUCATION: CPT

## 2023-08-21 ENCOUNTER — OFFICE VISIT (OUTPATIENT)
Age: 43
End: 2023-08-21
Payer: COMMERCIAL

## 2023-08-21 VITALS — TEMPERATURE: 97.7 F | HEIGHT: 66 IN | WEIGHT: 188 LBS | BODY MASS INDEX: 30.22 KG/M2

## 2023-08-21 DIAGNOSIS — G89.29 CHRONIC PAIN OF LEFT KNEE: ICD-10-CM

## 2023-08-21 DIAGNOSIS — Z96.652 S/P TOTAL KNEE ARTHROPLASTY, LEFT: Primary | ICD-10-CM

## 2023-08-21 DIAGNOSIS — M25.562 CHRONIC PAIN OF LEFT KNEE: ICD-10-CM

## 2023-08-21 PROCEDURE — 99213 OFFICE O/P EST LOW 20 MIN: CPT | Performed by: PHYSICIAN ASSISTANT

## 2023-08-21 NOTE — PROGRESS NOTES
Patient: sAtrid Decker                MRN: [id number]       SSN: [id number]  YOB: 1980        AGE: 37 y.o. SEX: female      PCP: Yousif Paredes MD  23    Chief Complaint   Patient presents with    Knee Pain     Left         HISTORY:    Astrid Decker is a 37 y.o. female returns to the office for follow-up regarding her left total knee replacement. Surgery was in 2023. Patient is generally doing well she is continuing outpatient physical therapy. She has improved with her range of motion but still notes pain. She is still limited with her endurance. 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:   No Known Allergies     CURRENT MEDICATIONS:  A list of medications prior to the time of admission include:  Prior to Admission medications    Medication Sig Start Date End Date Taking?  Authorizing Provider   [facility]

## 2023-08-22 ENCOUNTER — APPOINTMENT (OUTPATIENT)
Facility: HOSPITAL | Age: 43
End: 2023-08-22
Payer: COMMERCIAL

## 2023-08-24 ENCOUNTER — HOSPITAL ENCOUNTER (OUTPATIENT)
Facility: HOSPITAL | Age: 43
Setting detail: RECURRING SERIES
Discharge: HOME | End: 2023-08-27
Payer: COMMERCIAL

## 2023-08-24 PROCEDURE — 97110 THERAPEUTIC EXERCISES: CPT

## 2023-08-24 PROCEDURE — 97112 NEUROMUSCULAR REEDUCATION: CPT

## 2023-08-24 PROCEDURE — 97530 THERAPEUTIC ACTIVITIES: CPT

## 2023-08-24 NOTE — PROGRESS NOTES
08/31/2023  Auth due 7 more by 10/7/2023    PLAN  Yes  Continue plan of care  [x]  Upgrade activities as tolerated  []  Discharge due to :  []  Other:    Neo Pulido PTA    8/24/2023    8:50 AM    Future Appointments   Date Time Provider [address] [place] [address]   8/24/2023  9:30 AM eNo Pulido PTA [address]   8/28/2023  9:30 AM [address] [place] [address]   8/30/2023  9:30 AM TIERRA Sifuentes [address]   11/21/2023  9:00 AM Ruben Carpio PA-C [facility] BS AMB

## 2023-08-28 ENCOUNTER — APPOINTMENT (OUTPATIENT)
Facility: HOSPITAL | Age: 43
End: 2023-08-28
Payer: COMMERCIAL

## 2023-08-29 ENCOUNTER — APPOINTMENT (OUTPATIENT)
Facility: HOSPITAL | Age: 43
End: 2023-08-29
Payer: COMMERCIAL

## 2023-08-30 ENCOUNTER — HOSPITAL ENCOUNTER (OUTPATIENT)
Facility: HOSPITAL | Age: 43
Setting detail: RECURRING SERIES
Discharge: HOME | End: 2023-09-02
Payer: COMMERCIAL

## 2023-08-30 PROCEDURE — 97110 THERAPEUTIC EXERCISES: CPT

## 2023-08-30 PROCEDURE — 97535 SELF CARE MNGMENT TRAINING: CPT

## 2023-08-30 PROCEDURE — 97530 THERAPEUTIC ACTIVITIES: CPT

## 2023-08-30 PROCEDURE — 97112 NEUROMUSCULAR REEDUCATION: CPT

## 2023-08-30 NOTE — PROGRESS NOTES
[facility] - [address] [place] [address] [place], [address] [facility]  [phone] fax    PROGRESS NOTE  Patient Name: Mirtha Baxter : 1980   Treatment/Medical Diagnosis: Pain in left knee [M25.562]   Referral Source: Davionliset Feng     Date of Initial Visit: 3/21/23 Attended Visits: 36 Missed Visits: 1     SUMMARY OF TREATMENT  Pt reporting 87% subjective improvement since starting PT. Stating that AROM has improved as it is not as painful. Pain overall has improved but continues to persists with most pain occurring after prolonged walking/standing. She reports that her limping has decreased when walking since her last PN. Stairs continues to be very difficult due to pain & persistent weakness at her quads. Noting improvements with her lifting mechanics today requiring minimal cues. Her knee AROM continues to be limited with flexion but able to achieve 0 degrees of knee extension; noting hypomobility at left patella in all planes compared to right patella. Strength at left hamstring is improved but still lacking. Pt would benefit from continued skilled PT to address above deficits 2x a week to facilitate patient towards her PLOF. Will plan to D/C should if no progress seen at next PN. CURRENT STATUS    Functional Gains: Pt reporting improvements with AROM as knee is not as painful; walking has improved as she does not limp as much. Not using the cane as much when ambulating. Lifting has improved due to improved lifting mechanics able to tolerate 15-20#. Can tolerate home chores but unable to perform yard work. Functional Deficits: Stairs continues to be very difficult but continues to only be able to perform 1 step at a time. Prolonged ambulation/standing continues to be hard due to decreased functional activity tolerance.  Getting in and out of tub is still difficult.      % improvement: 87%  Pain   Average: 3/10                  Best: 2/10
2-100 degrees               PN Status = Progressing, 109 degrees after exercises    2. Pt will demonstrate left knee flexion strength minimum 5-/5 in order to improve ambulation distances for ADLs and community engagement. PN Status: NOT MET, 4-/5              Pn Status = not met but improving 4/5 with pain    3. Pt will score at least 49 on FOTO in order to improve overall function, decrease pain, and facilitate return to PLOF. PN Status = Met, 52              Pn Status = met 52    4. Pt will be able to perform 10/10 box lifts with good mechanics & without increasing pain in left knee over a 4/10 to improve her ability to perform yard-work at home. PN status = apprehensive with task due to increased left knee pain, fair mechanics however lifting with box away from body; required 8 inch step to improve tolerance with box lift.                PN Status = Progressing, patient able to perform box lift 10/10 times with good form requiring minimal cues but still reporting increased pain & still unable to perform yard work     Next PN/ RC due 9/29/23  Auth due [address]  Yes  Continue plan of care  []  Upgrade activities as tolerated  []  Discharge due to :  []  Other:    Kayla Stauffer, PT    8/30/2023    9:36 AM    Future Appointments   Date Time Provider [address]   11/21/2023  9:00 AM Robles Lombardo PA-C [facility] BS AMB

## 2023-09-06 ENCOUNTER — HOSPITAL ENCOUNTER (OUTPATIENT)
Facility: HOSPITAL | Age: 43
Setting detail: RECURRING SERIES
Discharge: HOME | End: 2023-09-09
Payer: COMMERCIAL

## 2023-09-06 PROCEDURE — 97530 THERAPEUTIC ACTIVITIES: CPT

## 2023-09-06 PROCEDURE — 97112 NEUROMUSCULAR REEDUCATION: CPT

## 2023-09-06 PROCEDURE — 97110 THERAPEUTIC EXERCISES: CPT

## 2023-09-06 NOTE — PROGRESS NOTES
maintain 52 points   Asses at next 30 days     4. Pt will be able to perform 10/10 box lifts with good mechanics & without increasing pain in left knee over a 4/10 to improve her ability to perform yard-work at home.               PN Status = Progressing, patient able to perform box lift 10/10 times with good form requiring minimal cues but still reporting increased pain    Tolerating exercises well; performing split squats to help improve functional LE strength    Next PN/ RC due 9/28/23  Auth due 3 more until 10/7/23    PLAN  - Continue Plan of [address] [place], [place]    9/6/2023    10:56 AM    Future Appointments   Date Time Provider [address]   9/8/2023 10:50 AM [facility]   9/13/2023 10:50 AM TIERRA Sifuentes [facility]   9/15/2023 10:50 AM Rachel Ramos [facility]   11/21/2023  9:00 AM GLADYS Valle BS AMB

## 2023-09-08 ENCOUNTER — APPOINTMENT (OUTPATIENT)
Facility: HOSPITAL | Age: 43
End: 2023-09-08
Payer: COMMERCIAL

## 2023-09-13 ENCOUNTER — HOSPITAL ENCOUNTER (OUTPATIENT)
Facility: HOSPITAL | Age: 43
Setting detail: RECURRING SERIES
Discharge: HOME | End: 2023-09-16
Payer: COMMERCIAL

## 2023-09-13 PROCEDURE — 97530 THERAPEUTIC ACTIVITIES: CPT

## 2023-09-13 PROCEDURE — 97112 NEUROMUSCULAR REEDUCATION: CPT

## 2023-09-13 PROCEDURE — 97110 THERAPEUTIC EXERCISES: CPT

## 2023-09-13 NOTE — PROGRESS NOTES
PHYSICAL / OCCUPATIONAL THERAPY - DAILY TREATMENT NOTE (updated )    Patient Name: Joselyn Purchase    Date: 2023    : 1980  Insurance: Payor: Ole / Plan: Ole / Product Type: *No Product type* /      Patient  verified Yes     Visit #   Current / Total 2 10   Time   In / Out 1050 1145   Pain   In / Out 3 4   Subjective Functional Status/Changes: Reports that last Friday her knee was hurting a lot, unsure what she did. Her knee is a little better now but continues to fluctuate day to day. TREATMENT AREA =  Pain in left knee [M25.562]    OBJECTIVE    Modalities Rationale:     decrease inflammation and decrease pain to improve patient's ability to progress to PLOF and address remaining functional goals. min [] Estim Unattended, type/location:                                      []  w/ice    []  w/heat    min [] Estim Attended, type/location:                                     []  w/US     []  w/ice    []  w/heat    []  TENS insruct      min []  Mechanical Traction: type/lbs                   []  pro   []  sup   []  int   []  cont    []  before manual    []  after manual    min []  Ultrasound, settings/location:     10 min  unbill [x]  Ice     []  Heat    location/position: Supine with ice at left knee    min []  Paraffin,  details:     min []  Vasopneumatic Device, press/temp:     min []  Velvet Cross / Donnamae Lycoming: If using vaso (only need to measure limb vaso being performed on)      pre-treatment girth :       post-treatment girth :       measured at (landmark location) :      min []  Other:    Skin assessment post-treatment:   Intact      Therapeutic Procedures: Tx Min Billable or 1:1 Min (if diff from Tx Min) Procedure, Rationale, Specifics   15 15 33751 Therapeutic Exercise (timed):  increase ROM, strength, coordination, balance, and proprioception to improve patient's ability to progress to PLOF and address remaining functional goals.  (see flow

## 2023-09-15 ENCOUNTER — HOSPITAL ENCOUNTER (OUTPATIENT)
Facility: HOSPITAL | Age: 43
Setting detail: RECURRING SERIES
Discharge: HOME | End: 2023-09-18
Payer: COMMERCIAL

## 2023-09-15 PROCEDURE — 97112 NEUROMUSCULAR REEDUCATION: CPT

## 2023-09-15 PROCEDURE — 97530 THERAPEUTIC ACTIVITIES: CPT

## 2023-09-15 PROCEDURE — 97110 THERAPEUTIC EXERCISES: CPT

## 2023-10-27 RX ORDER — DOCUSATE SODIUM 100 MG/1
CAPSULE, LIQUID FILLED ORAL
Qty: 60 CAPSULE | Refills: 0 | Status: SHIPPED | OUTPATIENT
Start: 2023-10-27

## 2023-11-28 ENCOUNTER — OFFICE VISIT (OUTPATIENT)
Age: 43
End: 2023-11-28

## 2023-11-28 VITALS — BODY MASS INDEX: 31.02 KG/M2 | HEIGHT: 66 IN | WEIGHT: 193 LBS | TEMPERATURE: 97.8 F

## 2023-11-28 DIAGNOSIS — M25.662 JOINT STIFFNESS OF KNEE, LEFT: ICD-10-CM

## 2023-11-28 DIAGNOSIS — Z96.652 S/P TOTAL KNEE ARTHROPLASTY, LEFT: Primary | ICD-10-CM

## 2023-11-28 NOTE — PROGRESS NOTES
and provided to patient. Dictation disclaimer:  Please note that this dictation was completed with \"Dragon\", the computer voice recognition software. Today's exam was dictated using fluency dictation software (voice recognition software). Occasionally, sound-a-like computer induced   dictation errors will populate the report. Quite often unanticipated grammatical, syntax, homophones, and other interpretive errors are inadvertently transcribed by the computer software. Please disregard these errors. Please excuse any errors that have escaped final proofreading. Nadir STERLING, APC, MPAS, PA-C  [address] [facility]

## 2023-12-12 ENCOUNTER — HOSPITAL ENCOUNTER (OUTPATIENT)
Facility: HOSPITAL | Age: 43
Setting detail: RECURRING SERIES
Discharge: HOME | End: 2023-12-15
Payer: COMMERCIAL

## 2023-12-12 PROCEDURE — 97161 PT EVAL LOW COMPLEX 20 MIN: CPT

## 2023-12-12 NOTE — PROGRESS NOTES
[facility] - [address] [facility] [address] [place], [address] [facility]  [phone] fax    Plan of Care / Statement of Necessity for Physical Therapy Services     Patient Name: Joselyn Luevano : 1980   Medical   Diagnosis: Pain in left knee [M25.562]  S/P total knee arthroplasty, left [Z96.652] Treatment Diagnosis: M25.562  LEFT KNEE PAIN       Onset Date: DOS 2023 Payor :  Payor: Ole / Plan: Ole / Product Type: *No Product type* /    Referral Source: Latosha Alaniz [facility]): 2023   Prior Hospitalization: See medical history Provider #: [id number]   Prior Level of Function: Independent with ADLs, functional, and daily tasks with pain in the left knee prior to surgery. Comorbidities: FM, Depression, Arthritis, Thyroid Problems, Weight Change > 10 lbs, Spinal Tumor; Hyperlipidemia      Assessment / key information:    Pt is a 37year old female who presents to therapy today with left knee pain. Pt had left TKR surgery in 2023. She was been seen for therapy for her TKR surgery but was d/c'ed secondary to family health issues. Pt reports having worsening of her pain since last coming to therapy. She has pain with stair negotiation, standing, walking, and with sleeping. She has increased swelling with prolonged activity. Pt demonstrated decreased AROM, decreased strength, muscle tightness, tenderness to palpation, and swelling. Pt would benefit from physical therapy to improve the above impairments to help the pt return to performing ADLs, functional and daily activities.      Evaluation Complexity:  History:  HIGH Complexity :3+ comorbidities / personal factors will impact the outcome/ POC ; Examination:  MEDIUM Complexity : 3 Standardized tests and measures addressin body structure, function, activity limitation and / or participation in recreation  ;Presentation:  LOW Complexity : Stable,

## 2023-12-12 NOTE — PROGRESS NOTES
PHYSICAL / OCCUPATIONAL THERAPY - DAILY TREATMENT NOTE (updated )    Patient Name: Yulissa Harmon    Date: 2023    : 1980  Insurance: Payor: Ole / Plan: Ole / Product Type: *No Product type* /      Patient  verified Yes     Visit #   Current / Total 1 10   Time   In / Out 12:17 12:54   Pain   In / Out 3 3-4   Subjective Functional Status/Changes: See POC   Changes to:  Meds, Allergies, Med Hx, Sx Hx? If yes, update Summary List no       TREATMENT AREA =  Pain in left knee [M25.562]  S/P total knee arthroplasty, left [Z96.652]    OBJECTIVE    13 min   Eval - untimed                      Therapeutic Procedures: Tx Min Billable or 1:1 Min (if diff from Tx Min) Procedure, Rationale, Specifics   12  74718 Therapeutic Exercise (timed):  increase ROM, strength, coordination, balance, and proprioception to improve patient's ability to progress to PLOF and address remaining functional goals. (see flow sheet as applicable)     Details if applicable:  HEP instruction and demonstration     12  44429 Self Care/Home Management (timed):  improve patient knowledge and understanding of pain reducing techniques, positioning, posture/ergonomics, home safety, activity modification, diagnosis/prognosis, and physical therapy expectations, procedures and progression  to improve patient's ability to progress to PLOF and address remaining functional goals.   (see flow sheet as applicable)     Details if applicable:  pt education on relevant anatomy/physiology    24  [facility] BC Totals Reminder: bill using total billable min of TIMED therapeutic procedures (example: do not include dry needle or estim unattended, both untimed codes, in totals to left)  8-22 min = 1 unit; 23-37 min = 2 units; 38-52 min = 3 units; 53-67 min = 4 units; 68-82 min = 5 units   Total Total     TOTAL TREATMENT TIME:        37     [x]  Patient Education billed concurrently with other procedures   [x] Review

## 2024-01-04 NOTE — PROGRESS NOTES
PHYSICAL / OCCUPATIONAL THERAPY - DAILY TREATMENT NOTE (updated )    Patient Name: Ana Black    Date: 2024    : 1980  Insurance: Payor: [employer] [facility] / Plan: [employer] [facility] [place] / Product Type: *No Product type* /      Patient  verified Yes     Visit #   Current / Total 2 10   Time   In / Out 11:30 12:18   Pain   In / Out 5 4   Subjective Functional Status/Changes: Pt notes she has difficulty lifting her leg while sitting to cross her legs and trouble with stairs.   Changes to:  Meds, Allergies, Med Hx, Sx Hx?  If yes, update Summary List no       TREATMENT AREA =  Pain in left knee [M25.562]  S/P total knee arthroplasty, left [Z96.652]    OBJECTIVE  Modalities Rationale:     decrease pain to improve patient's ability to progress to PLOF and address remaining functional goals.     min [] Estim Unattended, type/location:                                      []  w/ice    []  w/heat    min [] Estim Attended, type/location:                                     []  w/US     []  w/ice    []  w/heat    []  TENS insruct      min []  Mechanical Traction: type/lbs                   []  pro   []  sup   []  int   []  cont    []  before manual    []  after manual    min []  Ultrasound, settings/location:     10 min  unbill [x]  Ice     []  Heat    location/position: Supine, L knee    min []  Paraffin,  details:     min []  Vasopneumatic Device, press/temp:     min []  Whirlpool / Fluido:    If using vaso (only need to measure limb vaso being performed on)      pre-treatment girth :       post-treatment girth :       measured at (landmark location) :      min []  Other:    Skin assessment post-treatment:   Intact    Therapeutic Procedures:  Tx Min Billable or 1:1 Min (if diff from Tx Min) Procedure, Rationale, Specifics   [phone] [id number] Therapeutic Exercise (timed):  increase ROM, strength, coordination, balance, and proprioception to improve patient's ability to progress to PLOF and address

## 2024-01-05 ENCOUNTER — HOSPITAL ENCOUNTER (OUTPATIENT)
Facility: HOSPITAL | Age: 44
Setting detail: RECURRING SERIES
Discharge: HOME | End: 2024-01-08
Payer: COMMERCIAL

## 2024-01-05 PROCEDURE — 97530 THERAPEUTIC ACTIVITIES: CPT

## 2024-01-05 PROCEDURE — 97112 NEUROMUSCULAR REEDUCATION: CPT

## 2024-01-05 PROCEDURE — 97110 THERAPEUTIC EXERCISES: CPT

## 2024-01-11 ENCOUNTER — HOSPITAL ENCOUNTER (OUTPATIENT)
Facility: HOSPITAL | Age: 44
Setting detail: RECURRING SERIES
Discharge: HOME | End: 2024-01-14
Payer: COMMERCIAL

## 2024-01-11 PROCEDURE — 97112 NEUROMUSCULAR REEDUCATION: CPT

## 2024-01-11 PROCEDURE — 97535 SELF CARE MNGMENT TRAINING: CPT

## 2024-01-11 PROCEDURE — 97530 THERAPEUTIC ACTIVITIES: CPT

## 2024-01-11 PROCEDURE — 97110 THERAPEUTIC EXERCISES: CPT

## 2024-01-11 NOTE — PROGRESS NOTES
at end of session by 5 degrees. She reports trying to complete knee flexion on at step at home but states that she has had to decrease amount of time in the position due to swelling and pain.  Pt education on modifying HEP to supine heel slides with belt/sheet on days that she feels that the step is increasing her symptoms and to gradually return to the step on the days she feels improvement. All other goals have not yet been met as of her 3rd visit other than STG 1 which she reports compliance.     Patient will continue to benefit from skilled PT / OT services to modify and progress therapeutic interventions, analyze and address functional mobility deficits, analyze and address ROM deficits, analyze and address strength deficits, analyze and address soft tissue restrictions, analyze and cue for proper movement patterns, analyze and modify for postural abnormalities, analyze and address imbalance/dizziness, and instruct in home and community integration to address functional deficits and attain remaining goals.    Progress toward goals / Updated goals:  []  See Progress Note/Recertification    Short Term Goals: To be accomplished in 2 treatments   Pt will report compliance and independence to HEP to help the pt manage their pain and symptoms.  Status at last note/certification:  established  Compliant 1/11/2024  Long Term Goals: To be accomplished in 10 treatments  Pt will increase FOTO score to 55 points to improve ability to perform ADLs.  Status at last note/certification: 36 points  2.  Pt will increase MMT left hip ABD to 4/5, knee flex/EXT to 4/5 to improve ability to tolerate prolonged standing.  Status at last note/certification: hip ABD 3+/5, knee flex/EXT 4-/5 (within available AROM)  Flexion: 4-/5, Extension 4-/5, hip abduction 3+/5  3.  Pt will increase AROM left knee to 0 -115 degs to improve sleeping tolerance.  Status at last note/certification: 1-103 degs  0-90 degrees before exercises, 0-95 degrees

## 2024-01-12 ENCOUNTER — TRANSCRIBE ORDERS (OUTPATIENT)
Dept: SCHEDULING | Age: 44
End: 2024-01-12

## 2024-01-12 ENCOUNTER — HOSPITAL ENCOUNTER (OUTPATIENT)
Facility: HOSPITAL | Age: 44
End: 2024-01-12
Payer: COMMERCIAL

## 2024-01-12 ENCOUNTER — HOSPITAL ENCOUNTER (OUTPATIENT)
Facility: HOSPITAL | Age: 44
Setting detail: RECURRING SERIES
Discharge: HOME | End: 2024-01-15
Payer: COMMERCIAL

## 2024-01-12 DIAGNOSIS — Z96.652 S/P TOTAL KNEE ARTHROPLASTY, LEFT: Primary | ICD-10-CM

## 2024-01-12 DIAGNOSIS — M79.89 PAIN AND SWELLING OF LEFT LOWER LEG: ICD-10-CM

## 2024-01-12 DIAGNOSIS — M79.662 PAIN AND SWELLING OF LEFT LOWER LEG: ICD-10-CM

## 2024-01-12 DIAGNOSIS — M79.89 PAIN AND SWELLING OF LEFT LOWER LEG: Primary | ICD-10-CM

## 2024-01-12 DIAGNOSIS — M79.89 SWELLING OF LIMB: ICD-10-CM

## 2024-01-12 DIAGNOSIS — M79.662 PAIN AND SWELLING OF LEFT LOWER LEG: Primary | ICD-10-CM

## 2024-01-12 PROCEDURE — 93971 EXTREMITY STUDY: CPT

## 2024-01-12 PROCEDURE — 97110 THERAPEUTIC EXERCISES: CPT

## 2024-01-12 PROCEDURE — 93971 EXTREMITY STUDY: CPT | Performed by: SURGERY

## 2024-01-12 PROCEDURE — 97535 SELF CARE MNGMENT TRAINING: CPT

## 2024-01-12 PROCEDURE — 97530 THERAPEUTIC ACTIVITIES: CPT

## 2024-01-12 NOTE — PROGRESS NOTES
Patient in physical therapy today with therapist concern that patient developing left lower leg below the knee edema with pain.  Patient's status post left primary knee replacement within the past year.    Patient is a smoker.    Left lower leg examined to reveal pitting edema 1+ mid anterior tibia extending to the dorsum of the left foot.    Mildly positive Homans' sign palpation left proximal calf.    Plan: Stat noninvasive study done left lower extremity.  Hold PT at this time.

## 2024-01-12 NOTE — PROGRESS NOTES
[facility] [address]  [address]  [place] [address]  [phone] fax    PROGRESS NOTE  Patient Name: Ana Black : 1980   Treatment/Medical Diagnosis: Pain in left knee [M25.562]  S/P total knee arthroplasty, left [Z96.652]   Referral Source: Robles Lobmardo PA-C     Date of Initial Visit: 2023 Attended Visits: 4 Missed Visits: 0     SUMMARY OF TREATMENT    Ms. Black reports 75-80% improvement since beginning PT. Pt presents to PT with a discoloration in left LE compared to right and reports increased SOB and pain recently. Had CARRI Lombardo assess pt and he requested to hold off on PT for today and have pt have a doppler performed to rule out potential DVT. She has an improvement with strength, but still noted deficits. ROM was roughly the same as initial evaluation, but slight decrease in extension and flexion noted. We will continue with PT to address her remaining functional deficits.    CURRENT STATUS  Short Term Goals: To be accomplished in 2 treatments   Pt will report compliance and independence to [facility] to help the pt manage their pain and symptoms.  Status at last note/certification:  established  PROGRESSING; reports daily compliance  Long Term Goals: To be accomplished in 10 treatments  Pt will increase FOTO score to 55 points to improve ability to perform ADLs.  Status at last note/certification: 36 points  NOT MET; 35  2.  Pt will increase MMT left hip ABD to 4/5, knee flex/EXT to 4/5 to improve ability to tolerate prolonged standing.  Status at last note/certification: hip ABD 3+/5, knee flex/EXT 4-/5 (within available AROM)  PROGRESSING; hip abduction: 4-/5, hip lexion 4/5, knee flexion 4-/5, knee extension 4+/5 within available ROM  3.  Pt will increase AROM left knee to 0 -115 degs to improve sleeping tolerance.  Status at last note/certification: 1-103 degs  NOT MET; 3-100 deg  4.  Pt will be able to negotiate 4 six inch

## 2024-01-12 NOTE — PROGRESS NOTES
PHYSICAL / OCCUPATIONAL THERAPY - DAILY TREATMENT NOTE    Patient Name: Ana Black    Date: 2024    : 1980  Insurance: Payor: [facility] / Plan: [employer] [facility] / Product Type: *No Product type* /      Patient  verified Yes     Visit #   Current / Total 1 10   Time   In / Out 1056 1125   Pain   In / Out 4 4   Subjective Functional Status/Changes: \"I've been having more pain in my leg and I've been a little more short of breath.\"     TREATMENT AREA =  Pain in left knee [M25.562]  S/P total knee arthroplasty, left [Z96.652]    OBJECTIVE         Therapeutic Procedures:    Tx Min Billable or 1:1 Min (if diff from Tx Min) Procedure, Rationale, Specifics   9  [id number] Therapeutic Exercise (timed):  increase ROM, strength, coordination, balance, and proprioception to improve patient's ability to progress to PLOF and address remaining functional goals. (see flow sheet as applicable)     Details if applicable:       10  38184 Therapeutic Activity (timed):  use of dynamic activities replicating functional movements to increase ROM, strength, coordination, balance, and proprioception in order to improve patient's ability to progress to PLOF and address remaining functional goals.  (see flow sheet as applicable)     Details if applicable:     10  90673 Self Care/Home Management (timed):  improve patient knowledge and understanding of pain reducing techniques, positioning, posture/ergonomics, home safety, activity modification, diagnosis/prognosis, and physical therapy expectations, procedures and progression  to improve patient's ability to progress to PLOF and address remaining functional goals.  (see flow sheet as applicable)     Details if applicable:     29  [facility] Totals Reminder: bill using total billable min of TIMED therapeutic procedures (example: do not include dry needle or estim unattended, both untimed codes, in totals to left)  8-22 min = 1 unit; 23-37 min = 2 units; 38-52

## 2024-01-15 ENCOUNTER — HOSPITAL ENCOUNTER (OUTPATIENT)
Facility: HOSPITAL | Age: 44
Setting detail: RECURRING SERIES
Discharge: HOME | End: 2024-01-18
Payer: COMMERCIAL

## 2024-01-15 PROCEDURE — 97530 THERAPEUTIC ACTIVITIES: CPT

## 2024-01-15 PROCEDURE — 97112 NEUROMUSCULAR REEDUCATION: CPT

## 2024-01-15 PROCEDURE — 97110 THERAPEUTIC EXERCISES: CPT

## 2024-01-15 PROCEDURE — 97535 SELF CARE MNGMENT TRAINING: CPT

## 2024-01-15 NOTE — PROGRESS NOTES
PHYSICAL / OCCUPATIONAL THERAPY - DAILY TREATMENT NOTE    Patient Name: Ana Black    Date: 1/15/2024    : 1980  Insurance: Payor: [employer] [facility] / Plan: [employer] [facility] / Product Type: *No Product type* /      Patient  verified Yes     Visit #   Current / Total 2 10   Time   In / Out 930 1022   Pain   In / Out 4 3   Subjective Functional Status/Changes: \"My knee is still hurting, but the swelling is a little less.\"     TREATMENT AREA =  Pain in left knee [M25.562]  S/P total knee arthroplasty, left [Z96.652]    OBJECTIVE    Modalities Rationale:     decrease pain and increase tissue extensibility to improve patient's ability to progress to PLOF and address remaining functional goals.     min [] Estim Unattended, type/location:                                      []  w/ice    []  w/heat    min [] Estim Attended, type/location:                                     []  w/US     []  w/ice    []  w/heat    []  TENS insruct      min []  Mechanical Traction: type/lbs                   []  pro   []  sup   []  int   []  cont    []  before manual    []  after manual    min []  Ultrasound, settings/location:     10 min  unbill [x]  Ice     []  Heat    location/position:  Left knee in supine with wedge    min []  Paraffin,  details:     min []  Vasopneumatic Device, press/temp:     min []  Whirlpool / Fluido:    If using vaso (only need to measure limb vaso being performed on)      pre-treatment girth :       post-treatment girth :       measured at (landmark location) :      min []  Other:    Skin assessment post-treatment:   Intact      Therapeutic Procedures:    Tx Min Billable or 1:1 Min (if diff from Tx Min) Procedure, Rationale, Specifics   10  [id number] Therapeutic Exercise (timed):  increase ROM, strength, coordination, balance, and proprioception to improve patient's ability to progress to PLOF and address remaining functional goals. (see flow sheet as applicable)     Details if

## 2024-01-17 ENCOUNTER — HOSPITAL ENCOUNTER (OUTPATIENT)
Facility: HOSPITAL | Age: 44
Setting detail: RECURRING SERIES
Discharge: HOME | End: 2024-01-20
Payer: COMMERCIAL

## 2024-01-17 PROCEDURE — 97535 SELF CARE MNGMENT TRAINING: CPT

## 2024-01-17 PROCEDURE — 97112 NEUROMUSCULAR REEDUCATION: CPT

## 2024-01-17 PROCEDURE — 97110 THERAPEUTIC EXERCISES: CPT

## 2024-01-17 PROCEDURE — 97530 THERAPEUTIC ACTIVITIES: CPT

## 2024-01-17 NOTE — PROGRESS NOTES
PHYSICAL / OCCUPATIONAL THERAPY - DAILY TREATMENT NOTE    Patient Name: Ana Black    Date: 2024    : 1980  Insurance: Payor: [employer] [facility] / Plan: [employer] [facility] / Product Type: *No Product type* /      Patient  verified Yes     Visit #   Current / Total 3 10   Time   In / Out 931 1023   Pain   In / Out 4 4   Subjective Functional Status/Changes: \"Hurting today\"     TREATMENT AREA =  Pain in left knee [M25.562]  S/P total knee arthroplasty, left [Z96.652]    OBJECTIVE    Modalities Rationale:     decrease pain and increase tissue extensibility to improve patient's ability to progress to PLOF and address remaining functional goals.     min [] Estim Unattended, type/location:                                      []  w/ice    []  w/heat    min [] Estim Attended, type/location:                                     []  w/US     []  w/ice    []  w/heat    []  TENS insruct      min []  Mechanical Traction: type/lbs                   []  pro   []  sup   []  int   []  cont    []  before manual    []  after manual    min []  Ultrasound, settings/location:     10 min  unbill [x]  Ice     []  Heat    location/position:  Left knee in supine with wedge    min []  Paraffin,  details:     min []  Vasopneumatic Device, press/temp:     min []  Whirlpool / Fluido:    If using vaso (only need to measure limb vaso being performed on)      pre-treatment girth :       post-treatment girth :       measured at (landmark location) :      min []  Other:    Skin assessment post-treatment:   Intact      Therapeutic Procedures:    Tx Min Billable or 1:1 Min (if diff from Tx Min) Procedure, Rationale, Specifics   11  64058 Therapeutic Exercise (timed):  increase ROM, strength, coordination, balance, and proprioception to improve patient's ability to progress to PLOF and address remaining functional goals. (see flow sheet as applicable)     Details if applicable:         80711 Neuromuscular Re-Education

## 2024-01-22 ENCOUNTER — HOSPITAL ENCOUNTER (OUTPATIENT)
Facility: HOSPITAL | Age: 44
Setting detail: RECURRING SERIES
Discharge: HOME | End: 2024-01-25
Payer: COMMERCIAL

## 2024-01-22 PROCEDURE — 97112 NEUROMUSCULAR REEDUCATION: CPT

## 2024-01-22 PROCEDURE — 97110 THERAPEUTIC EXERCISES: CPT

## 2024-01-22 PROCEDURE — 97535 SELF CARE MNGMENT TRAINING: CPT

## 2024-01-22 PROCEDURE — 97530 THERAPEUTIC ACTIVITIES: CPT

## 2024-01-22 NOTE — PROGRESS NOTES
PHYSICAL / OCCUPATIONAL THERAPY - DAILY TREATMENT NOTE    Patient Name: Ana Black    Date: 2024    : 1980  Insurance: Payor: [employer] [facility] [facility] / Plan: [employer] [facility] / Product Type: *No Product type* /      Patient  verified Yes     Visit #   Current / Total 4 10   Time   In / Out 930 1023   Pain   In / Out 3 3   Subjective Functional Status/Changes: Reporting less pain than normal upon arrival     TREATMENT AREA =  Pain in left knee [M25.562]  S/P total knee arthroplasty, left [Z96.652]    OBJECTIVE    Modalities Rationale:     decrease pain and increase tissue extensibility to improve patient's ability to progress to PLOF and address remaining functional goals.     min [] Estim Unattended, type/location:                                      []  w/ice    []  w/heat    min [] Estim Attended, type/location:                                     []  w/US     []  w/ice    []  w/heat    []  TENS insruct      min []  Mechanical Traction: type/lbs                   []  pro   []  sup   []  int   []  cont    []  before manual    []  after manual    min []  Ultrasound, settings/location:     10 min  unbill [x]  Ice     []  Heat    location/position:  Left knee in supine with wedge    min []  Paraffin,  details:     min []  Vasopneumatic Device, press/temp:     min []  Whirlpool / Fluido:    If using vaso (only need to measure limb vaso being performed on)      pre-treatment girth :       post-treatment girth :       measured at (landmark location) :      min []  Other:    Skin assessment post-treatment:   Intact      Therapeutic Procedures:    Tx Min Billable or 1:1 Min (if diff from Tx Min) Procedure, Rationale, Specifics   11  31041 Therapeutic Exercise (timed):  increase ROM, strength, coordination, balance, and proprioception to improve patient's ability to progress to PLOF and address remaining functional goals. (see flow sheet as applicable)     Details if applicable:       11

## 2024-01-24 ENCOUNTER — HOSPITAL ENCOUNTER (OUTPATIENT)
Facility: HOSPITAL | Age: 44
Setting detail: RECURRING SERIES
Discharge: HOME | End: 2024-01-27
Payer: COMMERCIAL

## 2024-01-24 PROCEDURE — 97530 THERAPEUTIC ACTIVITIES: CPT

## 2024-01-24 PROCEDURE — 97110 THERAPEUTIC EXERCISES: CPT

## 2024-01-24 PROCEDURE — 97112 NEUROMUSCULAR REEDUCATION: CPT

## 2024-01-24 NOTE — PROGRESS NOTES
and address remaining functional goals. (see flow sheet as applicable)     Details if applicable:     10  74964 Therapeutic Activity (timed):  use of dynamic activities replicating functional movements to increase ROM, strength, coordination, balance, and proprioception in order to improve patient's ability to progress to PLOF and address remaining functional goals.  (see flow sheet as applicable)     Details if applicable:     x  80242 Self Care/Home Management (timed):  improve patient knowledge and understanding of pain reducing techniques, positioning, posture/ergonomics, home safety, activity modification, diagnosis/prognosis, and physical therapy expectations, procedures and progression  to improve patient's ability to progress to PLOF and address remaining functional goals.  (see flow sheet as applicable)     Details if applicable:     40  [facility] Totals Reminder: bill using total billable min of TIMED therapeutic procedures (example: do not include dry needle or estim unattended, both untimed codes, in totals to left)  8-22 min = 1 unit; 23-37 min = 2 units; 38-52 min = 3 units; 53-67 min = 4 units; 68-82 min = 5 units   Total Total     [x]  Patient Education billed concurrently with other procedures   [x] Review HEP    [] Progressed/Changed HEP, detail:    [] Other detail:       Objective Information/Functional Measures/Assessment:    Patient reporting increased ease with clamshells and lateral stepping with resistance, plan to increase resistance NV. Patient benefits from verbal cues to complete split squat with proper form, to prevent excessive trunk lean. Patient with some pain and requires UE assist on parallel bars for lateral heel taps, to strengthen hip abductors.  Patient completed all exercises as per flowsheet without reported adverse effects.     Patient will continue to benefit from skilled PT / OT services to modify and progress therapeutic interventions, analyze and address functional mobility

## 2024-01-29 ENCOUNTER — APPOINTMENT (OUTPATIENT)
Facility: HOSPITAL | Age: 44
End: 2024-01-29
Payer: COMMERCIAL

## 2024-01-31 ENCOUNTER — HOSPITAL ENCOUNTER (OUTPATIENT)
Facility: HOSPITAL | Age: 44
Setting detail: RECURRING SERIES
Discharge: HOME | End: 2024-02-03
Payer: COMMERCIAL

## 2024-01-31 PROCEDURE — 97535 SELF CARE MNGMENT TRAINING: CPT

## 2024-01-31 PROCEDURE — 97530 THERAPEUTIC ACTIVITIES: CPT

## 2024-01-31 PROCEDURE — 97110 THERAPEUTIC EXERCISES: CPT

## 2024-01-31 PROCEDURE — 97112 NEUROMUSCULAR REEDUCATION: CPT

## 2024-01-31 NOTE — PROGRESS NOTES
Progress as able. Advised pt to add prone quad stretching with strap and prone knee flexion with foam roll for active quad stretching to her HEP.     Patient will continue to benefit from skilled PT / OT services to modify and progress therapeutic interventions, analyze and address functional mobility deficits, analyze and address ROM deficits, analyze and address strength deficits, analyze and address soft tissue restrictions, analyze and cue for proper movement patterns, analyze and modify for postural abnormalities, analyze and address imbalance/dizziness, and instruct in home and community integration to address functional deficits and attain remaining goals.    Progress toward goals / Updated goals:  []  See Progress Note/Recertification     Pt will report compliance and independence to HEP to help the pt manage their pain and symptoms.  PN Status: reports daily compliance  Reports daily compliance still [Date assessed: 01/31/2024]    Pt will increase FOTO score to 55 points to improve ability to perform ADLs.  PN Status: 35  Assess at 30 day simran [Date assessed: 01/31/2024]    3.  Pt will increase MMT left hip ABD to 4/5, knee flex/EXT to 4/5 to improve ability to tolerate prolonged standing.  PN Status: hip abduction: 4-/5, hip lexion 4/5, knee flexion 4-/5, knee extension 4+/5 within available ROM  4.  Pt will increase AROM left knee to 0 -115 degs to improve sleeping tolerance.  PN Status: 3-100 deg  Current: 1-110 deg AROM left knee [Date assessed: 01/31/2024]    5.  Pt will be able to negotiate 4 six inch stairs with reciprocal step pattern, with or without handrails, with mild to no increased pain to improve ease of household mobility.   PN Status: Unable to test due to going to have Doppler to rule out DVT  PROGRESSING with stairs; [Date assessed: 01/31/2024]      Next PN/ RC due 02/10/2024  Auth due (visit number/ date) 6 more by 02/29/2024    PLAN  - Continue Plan of Care  - Upgrade activities as

## 2024-02-02 ENCOUNTER — HOSPITAL ENCOUNTER (OUTPATIENT)
Facility: HOSPITAL | Age: 44
Setting detail: RECURRING SERIES
Discharge: HOME | End: 2024-02-05
Payer: COMMERCIAL

## 2024-02-02 PROCEDURE — 97535 SELF CARE MNGMENT TRAINING: CPT

## 2024-02-02 PROCEDURE — 97530 THERAPEUTIC ACTIVITIES: CPT

## 2024-02-02 PROCEDURE — 97112 NEUROMUSCULAR REEDUCATION: CPT

## 2024-02-02 PROCEDURE — 97110 THERAPEUTIC EXERCISES: CPT

## 2024-02-02 NOTE — PROGRESS NOTES
Patient will continue to benefit from skilled PT / OT services to modify and progress therapeutic interventions, analyze and address functional mobility deficits, analyze and address ROM deficits, analyze and address strength deficits, analyze and address soft tissue restrictions, analyze and cue for proper movement patterns, analyze and modify for postural abnormalities, analyze and address imbalance/dizziness, and instruct in home and community integration to address functional deficits and attain remaining goals.    Progress toward goals / Updated goals:  []  See Progress Note/Recertification     Pt will report compliance and independence to [facility] to help the pt manage their pain and symptoms.  PN Status: reports daily compliance  Reports daily compliance still [Date assessed: 02/02/2024]     Pt will increase FOTO score to 55 points to improve ability to perform ADLs.  PN Status: 35  Assess at 30 day simran [Date assessed: 02/02/2024]     3.  Pt will increase MMT left hip ABD to 4/5, knee flex/EXT to 4/5 to improve ability to tolerate prolonged standing.  PN Status: hip abduction: 4-/5, hip lexion 4/5, knee flexion 4-/5, knee extension 4+/5 within available ROM    4.  Pt will increase AROM left knee to 0 -115 degs to improve sleeping tolerance.  PN Status: 3-100 deg  Current: 1-110 deg AROM left knee [Date assessed: 01/31/2024]     5.  Pt will be able to negotiate 4 six inch stairs with reciprocal step pattern, with or without handrails, with mild to no increased pain to improve ease of household mobility.   PN Status: Unable to test due to going to have Doppler to rule out DVT  PROGRESSING with stairs; [Date assessed: 01/31/2024]       Next PN/ RC due 2/10/24  Auth due (visit number/ date) 5  more by 2/29/24    PLAN  - Continue Plan of Care    Tommy Lozano, PT    2/2/2024    10:10 AM    Future Appointments   Date Time Provider Department Center   2/5/2024 10:10 AM Brian Stinson, PT [facility]

## 2024-02-05 ENCOUNTER — HOSPITAL ENCOUNTER (OUTPATIENT)
Facility: HOSPITAL | Age: 44
Setting detail: RECURRING SERIES
Discharge: HOME | End: 2024-02-08
Payer: COMMERCIAL

## 2024-02-05 PROCEDURE — 97530 THERAPEUTIC ACTIVITIES: CPT

## 2024-02-05 PROCEDURE — 97110 THERAPEUTIC EXERCISES: CPT

## 2024-02-05 PROCEDURE — 97112 NEUROMUSCULAR REEDUCATION: CPT

## 2024-02-05 PROCEDURE — 97535 SELF CARE MNGMENT TRAINING: CPT

## 2024-02-05 NOTE — PROGRESS NOTES
PHYSICAL / OCCUPATIONAL THERAPY - DAILY TREATMENT NOTE    Patient Name: Ana Black    Date: 2024    : 1980  Insurance: Payor: [employer] [facility] [facility] / Plan: [employer] [facility] / Product Type: *No Product type* /      Patient  verified Yes     Visit #   Current / Total 8 10   Time   In / Out 1010 1111   Pain   In / Out 3 3   Subjective Functional Status/Changes: \"I think I still need some more therapy.\"     TREATMENT AREA =  Pain in left knee [M25.562]  S/P total knee arthroplasty, left [Z96.652]    OBJECTIVE    Modalities Rationale:     decrease pain to improve patient's ability to progress to PLOF and address remaining functional goals.     min [] Estim Unattended, type/location:                                      []  w/ice    []  w/heat    min [] Estim Attended, type/location:                                     []  w/US     []  w/ice    []  w/heat    []  TENS insruct      min []  Mechanical Traction: type/lbs                   []  pro   []  sup   []  int   []  cont    []  before manual    []  after manual    min []  Ultrasound, settings/location:     10 min  unbill [x]  Ice     []  Heat    location/position:  Left knee in supine with wedge    min []  Paraffin,  details:     min []  Vasopneumatic Device, press/temp:     min []  Whirlpool / Fluido:    If using vaso (only need to measure limb vaso being performed on)      pre-treatment girth :       post-treatment girth :       measured at (landmark location) :      min []  Other:    Skin assessment post-treatment:   Intact      Therapeutic Procedures:    Tx Min Billable or 1:1 Min (if diff from Tx Min) Procedure, Rationale, Specifics   10  64541 Therapeutic Exercise (timed):  increase ROM, strength, coordination, balance, and proprioception to improve patient's ability to progress to PLOF and address remaining functional goals. (see flow sheet as applicable)     Details if applicable:       [phone] Neuromuscular Re-Education

## 2024-02-07 ENCOUNTER — APPOINTMENT (OUTPATIENT)
Facility: HOSPITAL | Age: 44
End: 2024-02-07
Payer: COMMERCIAL

## 2024-03-07 ENCOUNTER — HOSPITAL ENCOUNTER (OUTPATIENT)
Facility: HOSPITAL | Age: 44
Setting detail: RECURRING SERIES
Discharge: HOME | End: 2024-03-10
Payer: COMMERCIAL

## 2024-03-07 PROCEDURE — 97530 THERAPEUTIC ACTIVITIES: CPT

## 2024-03-07 PROCEDURE — 97535 SELF CARE MNGMENT TRAINING: CPT

## 2024-03-07 NOTE — PROGRESS NOTES
step to gait pattern with handrail assist with reports of increased pain    RECOMMENDATIONS  Patient to continue PT 2x per week for additional 10 sessions  If you have any questions/comments please contact us directly at [phone]   Thank you for allowing us to assist in the care of your patient.  PTA Signature Deepa Cornelius PT     Therapist Signature: Deepa Cornelius PT Date: 3/7/2024   Reporting Period  1/12/24-3/7/24 Time: 4:03 PM

## 2024-03-07 NOTE — PROGRESS NOTES
difficulty going up the stairs, still having swelling with activity, cannot bend knee completely .  Pt rates pain as 5/10 at worst and 1/10 at best.  Pt reports a self-reported improvement rating of 85-87% since start of care.  Pt would continue to benefit from skilled therapy services to improve LE strength, improve knee ext/flex ROM and improve patient's functional mobility to return to PLOF.        Patient will continue to benefit from skilled PT / OT services to modify and progress therapeutic interventions, analyze and address functional mobility deficits, analyze and address ROM deficits, analyze and address strength deficits, analyze and address soft tissue restrictions, analyze and cue for proper movement patterns, analyze and modify for postural abnormalities, analyze and address imbalance/dizziness, and instruct in home and community integration to address functional deficits and attain remaining goals.    Progress toward goals / Updated goals:  []  See Progress Note/Recertification     Pt will report compliance and independence to [facility] to help the pt manage their pain and symptoms.  PN Status: reports daily compliance  Assess at NV [Date assessed: 02/05/2024]  Current: (3/7/24) Patient reporting that she does it at least 4x a week, but tries to do at least some of it everyday      Pt will increase FOTO score to 55 points to improve ability to perform ADLs.  PN Status: 35  Assess at NV [Date assessed: 02/05/2024]   Current: (3/7/24) 47/100 points  3.  Pt will increase MMT left hip ABD to 4/5, knee flex/EXT to 4/5 to improve ability to tolerate prolonged standing.  PN Status: hip abduction: 4-/5, hip lexion 4/5, knee flexion 4-/5, knee extension 4+/5 within available ROM  Assess NV [Date assessed: 02/05/2024]   Current: (3/7/24)  Left hip flex= 3+/5   Left knee ext= 4+/10 with 3/10 p! On lateral knee   Left hip abd =4-/5  Knee flexion= 3+/5 with 3/10 p!  4.  Pt will increase AROM left knee to 0 -115 degs to

## 2024-08-09 RX ORDER — PANTOPRAZOLE SODIUM 40 MG/1
40 TABLET, DELAYED RELEASE ORAL DAILY
Qty: 90 TABLET | Refills: 3 | Status: SHIPPED | OUTPATIENT
Start: 2024-08-09

## 2024-08-09 NOTE — TELEPHONE ENCOUNTER
Please share with patient that I am certainly fine refilling this medication today however further refills should be sought through her family practitioner/internal medicine provider.

## 2024-10-28 ENCOUNTER — OFFICE VISIT (OUTPATIENT)
Age: 44
End: 2024-10-28

## 2024-10-28 VITALS — HEIGHT: 66 IN | TEMPERATURE: 97.7 F | WEIGHT: 207 LBS | BODY MASS INDEX: 33.27 KG/M2

## 2024-10-28 DIAGNOSIS — Z96.652 S/P TOTAL KNEE ARTHROPLASTY, LEFT: ICD-10-CM

## 2024-10-28 DIAGNOSIS — M25.461 EFFUSION OF RIGHT KNEE: ICD-10-CM

## 2024-10-28 DIAGNOSIS — M71.21 POPLITEAL CYST, RIGHT: ICD-10-CM

## 2024-10-28 DIAGNOSIS — M17.11 UNILATERAL PRIMARY OSTEOARTHRITIS, RIGHT KNEE: Primary | ICD-10-CM

## 2024-10-28 DIAGNOSIS — M65.90 SYNOVITIS: ICD-10-CM

## 2024-10-28 DIAGNOSIS — M25.561 ACUTE PAIN OF RIGHT KNEE: ICD-10-CM

## 2024-10-28 RX ORDER — BUPIVACAINE HYDROCHLORIDE 5 MG/ML
4 INJECTION, SOLUTION PERINEURAL ONCE
Status: COMPLETED | OUTPATIENT
Start: 2024-10-28 | End: 2024-10-28

## 2024-10-28 RX ORDER — BETAMETHASONE SODIUM PHOSPHATE AND BETAMETHASONE ACETATE 3; 3 MG/ML; MG/ML
3 INJECTION, SUSPENSION INTRA-ARTICULAR; INTRALESIONAL; INTRAMUSCULAR; SOFT TISSUE ONCE
Status: COMPLETED | OUTPATIENT
Start: 2024-10-28 | End: 2024-10-28

## 2024-10-28 RX ADMIN — BUPIVACAINE HYDROCHLORIDE 20 MG: 5 INJECTION, SOLUTION PERINEURAL at 16:27

## 2024-10-28 RX ADMIN — BETAMETHASONE SODIUM PHOSPHATE AND BETAMETHASONE ACETATE 3 MG: 3; 3 INJECTION, SUSPENSION INTRA-ARTICULAR; INTRALESIONAL; INTRAMUSCULAR; SOFT TISSUE at 16:28

## 2024-10-28 NOTE — PROGRESS NOTES
ORTHO EXAMINATION:  Examination Right knee Left knee   Skin Intact Intact, well healed TKR incision site   Range of motion 100-0 110-0   Effusion ++ -   Medial joint line tenderness + -   Lateral joint line tenderness - -   Popliteal tenderness - -   Osteophytes palpable + -   Ruben’s - -   Patella crepitus - -   Anterior drawer - -   Lateral laxity - -   Medial laxity - -   Varus deformity - -   Valgus deformity - -   Pretibial edema - -   Calf tenderness - -     RADIOGRAPHS:   10/28/24  3 VIEWS RT KNEE & 2V LT KNEE YULIA  Three views of right knee and two views of the left knee: no fractures, no effusion, Kellgren Ilan grade 3 with moderately severe joint space narrowing on the right, + osteophytes present. Well fixed TKR implants on the left.     11/28/23 XR LT KNEE 2V YULIA BRILLHART  total joint prosthesis intact with anatomical alignment no evidence of periprosthetic fracturing or dislocation.  No soft tissue ossifications.    XR KNEE LT PREOP 12/16/2022 YULIA  IMPRESSION:  Three views with bilateral knees on AP view - No fractures, no effusion, severe joint space narrowing, lateral osteophytes present. Kellgren Ilan grade 4. Femoral valgus angle 5.7 degrees.     XR LEFT KNEE 10/23/20 YULIA  IMPRESSION:  Three views - No fractures, no effusion, moderately severe joint space  narrowing, + osteophytes present. Kellgren Ilan grade 3    PROCEDURE: After discussing treatment options, patient's right knee was aspirated 5 cc of clear yellow fluid, followed by injection with 4 cc Marcaine and 1/2 cc Celestone.  Chart reviewed for the following:   Emiliano CONCEPCION MD, have reviewed the History, Physical and updated the Allergic reactions for Ana Wayne     TIME OUT performed immediately prior to start of procedure:  Emiliano CONCEPCION MD, have performed the following reviews on Ana Black prior to the start of the procedure:            * Patient was identified by name and date of birth

## 2024-11-18 NOTE — PROGRESS NOTES
Patient: Ana Black                MRN: [id number]       SSN: [id number]  YOB: 1980        AGE: 44 y.o.        SEX: female      PCP: Eugene Smith MD  11/21/24    Chief Complaint   Patient presents with    Knee Pain     Right knee visco 3 #1     HISTORY:  Ana Black is a 44 y.o. female who is seen for right knee pain. She presents today for her first injection in the Visco-3 visco supplementation series.    She was previously seen for popliteal right knee pain and swelling.  She denies any recent injury.  She feels pain with standing, walking and stair climbing.  She experiences startup pain after sitting. Her right knee gives way. She ambulates with a cane due to her knee pain. OTC pain medication helps some.      She is status post left TKR 2/21/23--doing well. 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 She underwent left knee  arthroscopy x 2 in [place] 2006.      She receives pain medication from Dr. Smith. She was in pain management when she lived in [place]. She was diagnosed with her schwannoma while living in [place].       Occupation, etc: Ms. Black is not currently employed. She used to work as a [employer]  in [place].  She is going to apply for disability benefits. She moved to  [facility] from [place] in 2018. She moved to this area to be closed to her sister who served 12 years in the "[employer]. She lives in [place] with her 17 yo daughter and 4 yo son. Ms. Black weighs 213 lbs and is 5'6\" tall. She has lost about 10  lb over this year with low-carb dieting.  Her daughter accompanies her to today's office visit. She has hypothyroidism and takes thyroxine.   Wt Readings from Last 3 Encounters:   11/21/24 93 kg (205 lb)   10/28/24 93.9 kg (207 lb)   11/28/23 87.5 kg (193 lb)      Body mass index is 33.09 kg/m².    Patient Active Problem List   Diagnosis    Uterine fibromyoma    Pelvic pain    Menorrhagia

## 2024-11-21 ENCOUNTER — OFFICE VISIT (OUTPATIENT)
Age: 44
End: 2024-11-21
Payer: COMMERCIAL

## 2024-11-21 VITALS — WEIGHT: 205 LBS | HEIGHT: 66 IN | BODY MASS INDEX: 32.95 KG/M2

## 2024-11-21 DIAGNOSIS — M17.11 UNILATERAL PRIMARY OSTEOARTHRITIS, RIGHT KNEE: Primary | ICD-10-CM

## 2024-11-21 PROCEDURE — 20610 DRAIN/INJ JOINT/BURSA W/O US: CPT | Performed by: SPECIALIST

## 2024-12-02 NOTE — PROGRESS NOTES
Patient: Ana Black                MRN: [id number]       SSN: [id number]  YOB: 1980        AGE: 44 y.o.        SEX: female  Body mass index is 33.09 kg/m².    PCP: Eugene Smith MD  12/05/24    Chief Complaint   Patient presents with    Knee Pain     Right knee visco 3 #2      HISTORY:  Ana Black is a 44 y.o. female who is seen for right knee pain. She presents today for her second injection in the Visco-3 visco supplementation series.      ICD-10-CM    1. Unilateral primary osteoarthritis, right knee  M17.11 DRAIN/INJECT LARGE JOINT/BURSA     sodium hyaluronate (SUPARTZ) injection 25 mg      2. Effusion of right knee  M25.461 DRAIN/INJECT LARGE JOINT/BURSA         PROCEDURE:  After timeout and under sterile conditions, right knee was aspirated 30 cc clear yellow fluid. The fluid was discarded. Ms. Black's right knee injected with 2 cc of Visco-3.     Chart reviewed for the following:   Emiliano CONCEPCION MD, have reviewed the History, Physical and updated the Allergic reactions for Ana Black     TIME OUT performed immediately prior to start of procedure:  Emiliano CONCEPCION MD, have performed the following reviews on Ana Black prior to the start of the procedure:            * Patient was identified by name and date of birth   * Agreement on procedure being performed was verified  * Risks and Benefits explained to the patient  * Procedure site verified and marked as necessary  * Patient was positioned for comfort  * Consent was obtained     Time: 9:11 AM     Date of procedure: 12/5/2024    Procedure performed by:  Emiliano Keenan MD    Ms. Black tolerated the procedure well with no complications.    PLAN: After timeout and under sterile conditions, right knee was aspirated 30 cc clear yellow fluid. The fluid was discarded. Ms. Black's right knee injected with 2 cc of Visco-3. Ms. Black will follow up in one week to complete her visco supplementation

## 2024-12-05 ENCOUNTER — OFFICE VISIT (OUTPATIENT)
Age: 44
End: 2024-12-05
Payer: COMMERCIAL

## 2024-12-05 VITALS — BODY MASS INDEX: 32.95 KG/M2 | HEIGHT: 66 IN | WEIGHT: 205 LBS

## 2024-12-05 DIAGNOSIS — M17.11 UNILATERAL PRIMARY OSTEOARTHRITIS, RIGHT KNEE: Primary | ICD-10-CM

## 2024-12-05 DIAGNOSIS — M25.461 EFFUSION OF RIGHT KNEE: ICD-10-CM

## 2024-12-05 PROCEDURE — 20610 DRAIN/INJ JOINT/BURSA W/O US: CPT | Performed by: SPECIALIST

## 2024-12-06 NOTE — PROGRESS NOTES
Patient: Ana Black                MRN: [id number]       SSN: [id number]  YOB: 1980        AGE: 44 y.o.        SEX: female  Body mass index is 33.09 kg/m².    PCP: Eugene Smith MD  12/12/24    Chief Complaint   Patient presents with    Knee Pain     Right knee Visco 3 #3      HISTORY:  Ana Black is a 44 y.o. female who is seen for right knee pain. She presents today for her third injection in the Visco-3 visco supplementation series.    PROCEDURE:  After timeout and under sterile conditions, right knee was aspirated 25 cc clear yellow fluid. The fluid was discarded. Ms. Black's right knee injected with 2 cc of Visco-3.     TIME OUT performed immediately prior to start of procedure:  I, Emiliano Keenan MD, have performed the following reviews on Ana Black prior to the start of the procedure:            * Patient was identified by name and date of birth   * Agreement on procedure being performed was verified  * Risks and Benefits explained to the patient  * Procedure site verified and marked as necessary  * Patient was positioned for comfort  * Consent was obtained     Time: 9:21 AM     Date of procedure: 12/12/2024    Procedure performed by:  Emiliano Keenan MD    Ms. Black tolerated the procedure well with no complications      ICD-10-CM    1. Unilateral primary osteoarthritis, right knee  M17.11 DRAIN/INJECT LARGE JOINT/BURSA     sodium hyaluronate (SUPARTZ) injection 25 mg      2. Effusion of right knee  M25.461 DRAIN/INJECT LARGE JOINT/BURSA        PLAN:  After timeout and under sterile conditions, right knee was aspirated 25 cc clear yellow fluid. The fluid was discarded. Ms. Black's right knee injected with 2 cc of Visco-3. Ms. Black will follow up PRN now that she has completed her visco supplementation injection series.     Documentation by nkechi Chauhan, as documented by Emiliano Keenan MD.

## 2024-12-12 ENCOUNTER — OFFICE VISIT (OUTPATIENT)
Age: 44
End: 2024-12-12
Payer: COMMERCIAL

## 2024-12-12 VITALS — HEIGHT: 66 IN | WEIGHT: 205 LBS | BODY MASS INDEX: 32.95 KG/M2

## 2024-12-12 DIAGNOSIS — M25.461 EFFUSION OF RIGHT KNEE: ICD-10-CM

## 2024-12-12 DIAGNOSIS — M17.11 UNILATERAL PRIMARY OSTEOARTHRITIS, RIGHT KNEE: Primary | ICD-10-CM

## 2024-12-12 PROCEDURE — 20610 DRAIN/INJ JOINT/BURSA W/O US: CPT | Performed by: SPECIALIST

## (undated) DEVICE — PIN DRL DIA1/8IN QUIK REL FOR VANGUARD UNIV INSTR TOT KNEE 32486265] ZIMMER BIOMET ORTHOPEDICS]

## (undated) DEVICE — ELECTRODE PT RET AD L9FT HI MOIST COND ADH HYDRGEL CORDED

## (undated) DEVICE — 4-PORT MANIFOLD: Brand: NEPTUNE 2

## (undated) DEVICE — TAPE,CLOTH/SILK,CURAD,3"X10YD,LF,40/CS: Brand: CURAD

## (undated) DEVICE — SOLUTION IRRIG 3000ML 0.9% SOD CHL FLX CONT 0797208] ICU MEDICAL INC]

## (undated) DEVICE — HANDPIECE SET WITH HIGH FLOW TIP AND SUCTION TUBE: Brand: INTERPULSE

## (undated) DEVICE — OPTIFOAM GENTLE SA, POSTOP, 4X12: Brand: MEDLINE

## (undated) DEVICE — 3M™ TEGADERM™ TRANSPARENT FILM DRESSING FRAME STYLE, 1626W, 4 IN X 4-3/4 IN (10 CM X 12 CM), 50/CT 4CT/CASE: Brand: 3M™ TEGADERM™

## (undated) DEVICE — 2108 SERIES SAGITTAL BLADE (20.7 X 0.88 X 85.0MM)

## (undated) DEVICE — KIT EVAC 400CC DIA1/4IN H PAT 12.5IN 3 SPR RND SHP PVC DRN

## (undated) DEVICE — KIT CLN UP BON SECOURS MARYV

## (undated) DEVICE — BLADE, TONGUE, 6", STERILE: Brand: MEDLINE

## (undated) DEVICE — STERILE POLYISOPRENE POWDER-FREE SURGICAL GLOVES: Brand: PROTEXIS

## (undated) DEVICE — PACK SURG BSHR TOT KNEE LF

## (undated) DEVICE — APPLICATOR MEDICATED 26 CC SOLUTION HI LT ORNG CHLORAPREP

## (undated) DEVICE — DRAPE TWL SURG 16X26IN BLU ORB04] ALLCARE INC]

## (undated) DEVICE — PADDING CAST W6INXL4YD ST COT COHESIVE HND TEARABLE SPEC

## (undated) DEVICE — SOLUTION IV 1000ML 0.9% SOD CHL

## (undated) DEVICE — Device

## (undated) DEVICE — DRESSING FOAM DISK DIA1IN H 7MM HYDRPHLC CHG IMPREG IN SL

## (undated) DEVICE — SUTURE ABSORBABLE BRAIDED 2-0 CT-1 27 IN UD VICRYL J259H

## (undated) DEVICE — BANDAGE COMPR W6INXL5YD WHT BGE POLY COT M E WRP WV HK AND

## (undated) DEVICE — WATERPROOF, BACTERIA PROOF DRESSING WITH ABSORBENT SEE THROUGH PAD: Brand: OPSITE POST-OP VISIBLE 35X10CM CTN 20

## (undated) DEVICE — Device: Brand: JELCO

## (undated) DEVICE — DECANTER BAG 9": Brand: MEDLINE INDUSTRIES, INC.

## (undated) DEVICE — INTENDED FOR TISSUE SEPARATION, AND OTHER PROCEDURES THAT REQUIRE A SHARP SURGICAL BLADE TO PUNCTURE OR CUT.: Brand: BARD-PARKER SAFETY BLADES SIZE 10, STERILE

## (undated) DEVICE — BNDG,ELSTC,MATRIX,STRL,6"X5YD,LF,HOOK&LP: Brand: MEDLINE

## (undated) DEVICE — SUTURE VCRL SZ 2 L27IN ABSRB VLT L65MM TP-1 1/2 CIR J649G

## (undated) DEVICE — WATERPROOF, BACTERIA PROOF DRESSING WITH ABSORBENT SEE THROUGH PAD: Brand: OPSITE POST-OP VISIBLE 30X10CM CTN 20

## (undated) DEVICE — PREMIUM DRY TRAY LF: Brand: MEDLINE INDUSTRIES, INC.

## (undated) DEVICE — HOOD WITH PEEL AWAY FACE SHIELD: Brand: T7PLUS